# Patient Record
Sex: FEMALE | Race: WHITE | Employment: UNEMPLOYED | URBAN - METROPOLITAN AREA
[De-identification: names, ages, dates, MRNs, and addresses within clinical notes are randomized per-mention and may not be internally consistent; named-entity substitution may affect disease eponyms.]

---

## 2017-01-11 ENCOUNTER — TELEPHONE (OUTPATIENT)
Dept: FAMILY MEDICINE CLINIC | Age: 47
End: 2017-01-11

## 2017-02-10 ENCOUNTER — OFFICE VISIT (OUTPATIENT)
Dept: FAMILY MEDICINE CLINIC | Age: 47
End: 2017-02-10

## 2017-02-10 VITALS — DIASTOLIC BLOOD PRESSURE: 84 MMHG | SYSTOLIC BLOOD PRESSURE: 116 MMHG | HEART RATE: 84 BPM | TEMPERATURE: 97.8 F

## 2017-02-10 DIAGNOSIS — N95.1 PERIMENOPAUSAL: ICD-10-CM

## 2017-02-10 DIAGNOSIS — G47.9 SLEEP DISTURBANCE: ICD-10-CM

## 2017-02-10 DIAGNOSIS — R05.3 PERSISTENT COUGH: Primary | ICD-10-CM

## 2017-02-10 DIAGNOSIS — F41.9 ANXIETY: ICD-10-CM

## 2017-02-10 PROCEDURE — 99213 OFFICE O/P EST LOW 20 MIN: CPT | Performed by: NURSE PRACTITIONER

## 2017-02-10 RX ORDER — ALBUTEROL SULFATE 90 UG/1
2 AEROSOL, METERED RESPIRATORY (INHALATION) EVERY 6 HOURS PRN
Qty: 1 INHALER | Refills: 3 | Status: SHIPPED | OUTPATIENT
Start: 2017-02-10 | End: 2019-07-19 | Stop reason: SDUPTHER

## 2017-02-10 RX ORDER — CETIRIZINE HYDROCHLORIDE 10 MG/1
10 TABLET ORAL DAILY
Qty: 30 TABLET | Refills: 1 | Status: SHIPPED | OUTPATIENT
Start: 2017-02-10 | End: 2017-03-23

## 2017-02-10 RX ORDER — FLUTICASONE PROPIONATE 50 MCG
1 SPRAY, SUSPENSION (ML) NASAL DAILY
Qty: 1 BOTTLE | Refills: 3 | Status: SHIPPED | OUTPATIENT
Start: 2017-02-10 | End: 2017-12-20 | Stop reason: SDUPTHER

## 2017-02-10 RX ORDER — CLONAZEPAM 0.5 MG/1
.5-1 TABLET ORAL 3 TIMES DAILY PRN
Qty: 90 TABLET | Refills: 1 | Status: SHIPPED | OUTPATIENT
Start: 2017-02-10 | End: 2017-11-14 | Stop reason: ALTCHOICE

## 2017-02-10 RX ORDER — PROMETHAZINE HYDROCHLORIDE AND CODEINE PHOSPHATE 6.25; 1 MG/5ML; MG/5ML
5 SYRUP ORAL EVERY 8 HOURS PRN
Qty: 118 ML | Refills: 0 | Status: SHIPPED | OUTPATIENT
Start: 2017-02-10 | End: 2017-03-02 | Stop reason: SDUPTHER

## 2017-02-22 ENCOUNTER — TELEPHONE (OUTPATIENT)
Dept: FAMILY MEDICINE CLINIC | Age: 47
End: 2017-02-22

## 2017-02-22 RX ORDER — HYDROXYZINE HYDROCHLORIDE 25 MG/1
25-50 TABLET, FILM COATED ORAL NIGHTLY PRN
Qty: 60 TABLET | Refills: 1 | Status: SHIPPED | OUTPATIENT
Start: 2017-02-22 | End: 2017-04-14 | Stop reason: SINTOL

## 2017-02-22 RX ORDER — ZOLPIDEM TARTRATE 10 MG/1
10 TABLET ORAL NIGHTLY PRN
Qty: 30 TABLET | Refills: 0 | Status: SHIPPED | OUTPATIENT
Start: 2017-02-22 | End: 2017-03-23 | Stop reason: SDUPTHER

## 2017-02-26 ASSESSMENT — ENCOUNTER SYMPTOMS
SHORTNESS OF BREATH: 0
WHEEZING: 1
GASTROINTESTINAL NEGATIVE: 1
EYES NEGATIVE: 1
COUGH: 1
CHEST TIGHTNESS: 0

## 2017-03-02 RX ORDER — PROMETHAZINE HYDROCHLORIDE AND CODEINE PHOSPHATE 6.25; 1 MG/5ML; MG/5ML
5 SYRUP ORAL EVERY 8 HOURS PRN
Qty: 118 ML | Refills: 0 | Status: SHIPPED | OUTPATIENT
Start: 2017-03-02 | End: 2017-03-09

## 2017-03-23 ENCOUNTER — OFFICE VISIT (OUTPATIENT)
Dept: FAMILY MEDICINE CLINIC | Age: 47
End: 2017-03-23

## 2017-03-23 VITALS
SYSTOLIC BLOOD PRESSURE: 118 MMHG | TEMPERATURE: 97.5 F | DIASTOLIC BLOOD PRESSURE: 88 MMHG | HEART RATE: 100 BPM | RESPIRATION RATE: 12 BRPM

## 2017-03-23 DIAGNOSIS — E11.9 DM TYPE 2, GOAL HBA1C < 8% (HCC): Primary | ICD-10-CM

## 2017-03-23 DIAGNOSIS — J06.9 VIRAL UPPER RESPIRATORY TRACT INFECTION: ICD-10-CM

## 2017-03-23 DIAGNOSIS — H65.07 RECURRENT ACUTE SEROUS OTITIS MEDIA, UNSPECIFIED LATERALITY: ICD-10-CM

## 2017-03-23 DIAGNOSIS — G47.9 SLEEP DISTURBANCE: ICD-10-CM

## 2017-03-23 PROCEDURE — 99213 OFFICE O/P EST LOW 20 MIN: CPT | Performed by: NURSE PRACTITIONER

## 2017-03-23 RX ORDER — ZOLPIDEM TARTRATE 10 MG/1
10 TABLET ORAL NIGHTLY PRN
Qty: 30 TABLET | Refills: 1 | Status: SHIPPED | OUTPATIENT
Start: 2017-03-23 | End: 2017-04-22

## 2017-03-23 RX ORDER — CEFUROXIME AXETIL 250 MG/1
250 TABLET ORAL 2 TIMES DAILY
Qty: 20 TABLET | Refills: 0 | Status: SHIPPED | OUTPATIENT
Start: 2017-03-23 | End: 2018-04-02 | Stop reason: SDUPTHER

## 2017-03-23 RX ORDER — ONDANSETRON 4 MG/1
4 TABLET, FILM COATED ORAL EVERY 8 HOURS PRN
Qty: 30 TABLET | Refills: 0 | Status: SHIPPED | OUTPATIENT
Start: 2017-03-23 | End: 2018-02-07 | Stop reason: SDUPTHER

## 2017-03-29 ASSESSMENT — ENCOUNTER SYMPTOMS
SORE THROAT: 1
SINUS PRESSURE: 1
GASTROINTESTINAL NEGATIVE: 1
COUGH: 1
SHORTNESS OF BREATH: 0
EYES NEGATIVE: 1

## 2017-04-03 ENCOUNTER — TELEPHONE (OUTPATIENT)
Dept: FAMILY MEDICINE CLINIC | Age: 47
End: 2017-04-03

## 2017-04-04 RX ORDER — RAMELTEON 8 MG/1
8 TABLET ORAL NIGHTLY PRN
Qty: 30 TABLET | Refills: 0 | Status: SHIPPED | OUTPATIENT
Start: 2017-04-04 | End: 2017-06-15

## 2017-06-15 ENCOUNTER — OFFICE VISIT (OUTPATIENT)
Dept: FAMILY MEDICINE CLINIC | Age: 47
End: 2017-06-15

## 2017-06-15 VITALS
TEMPERATURE: 97.6 F | DIASTOLIC BLOOD PRESSURE: 84 MMHG | BODY MASS INDEX: 40.93 KG/M2 | SYSTOLIC BLOOD PRESSURE: 108 MMHG | HEART RATE: 100 BPM | WEIGHT: 231 LBS | RESPIRATION RATE: 20 BRPM | HEIGHT: 63 IN

## 2017-06-15 DIAGNOSIS — M54.16 LUMBAR RADICULOPATHY: ICD-10-CM

## 2017-06-15 DIAGNOSIS — G47.9 SLEEP DISTURBANCE: ICD-10-CM

## 2017-06-15 DIAGNOSIS — F41.9 ANXIETY: ICD-10-CM

## 2017-06-15 DIAGNOSIS — M15.9 PRIMARY OSTEOARTHRITIS INVOLVING MULTIPLE JOINTS: ICD-10-CM

## 2017-06-15 DIAGNOSIS — E11.9 TYPE 2 DIABETES, HBA1C GOAL < 7% (HCC): Primary | ICD-10-CM

## 2017-06-15 LAB
CREATININE URINE POCT: 200
MICROALBUMIN/CREAT 24H UR: 10 MG/G{CREAT}
MICROALBUMIN/CREAT UR-RTO: 30

## 2017-06-15 PROCEDURE — 96372 THER/PROPH/DIAG INJ SC/IM: CPT | Performed by: NURSE PRACTITIONER

## 2017-06-15 PROCEDURE — 82044 UR ALBUMIN SEMIQUANTITATIVE: CPT | Performed by: NURSE PRACTITIONER

## 2017-06-15 PROCEDURE — 99213 OFFICE O/P EST LOW 20 MIN: CPT | Performed by: NURSE PRACTITIONER

## 2017-06-15 RX ORDER — CITALOPRAM 40 MG/1
TABLET ORAL
Qty: 90 TABLET | Refills: 2 | Status: SHIPPED | OUTPATIENT
Start: 2017-06-15 | End: 2017-12-20

## 2017-06-15 RX ORDER — ZOLPIDEM TARTRATE 10 MG/1
TABLET ORAL NIGHTLY PRN
COMMUNITY
Start: 2017-05-17 | End: 2017-06-15 | Stop reason: SDUPTHER

## 2017-06-15 RX ORDER — OXYCODONE HYDROCHLORIDE AND ACETAMINOPHEN 5; 325 MG/1; MG/1
1 TABLET ORAL EVERY 4 HOURS PRN
Qty: 90 TABLET | Refills: 0 | Status: SHIPPED | OUTPATIENT
Start: 2017-06-15 | End: 2017-12-20 | Stop reason: SDUPTHER

## 2017-06-15 RX ORDER — ZOLPIDEM TARTRATE 10 MG/1
10 TABLET ORAL NIGHTLY PRN
Qty: 30 TABLET | Refills: 1 | Status: SHIPPED | OUTPATIENT
Start: 2017-06-15 | End: 2017-08-14 | Stop reason: SDUPTHER

## 2017-06-15 RX ORDER — METHYLPREDNISOLONE ACETATE 80 MG/ML
120 INJECTION, SUSPENSION INTRA-ARTICULAR; INTRALESIONAL; INTRAMUSCULAR; SOFT TISSUE ONCE
Status: COMPLETED | OUTPATIENT
Start: 2017-06-15 | End: 2017-06-15

## 2017-06-15 RX ADMIN — METHYLPREDNISOLONE ACETATE 120 MG: 80 INJECTION, SUSPENSION INTRA-ARTICULAR; INTRALESIONAL; INTRAMUSCULAR; SOFT TISSUE at 10:49

## 2017-06-26 ASSESSMENT — ENCOUNTER SYMPTOMS
EYES NEGATIVE: 1
RESPIRATORY NEGATIVE: 1
GASTROINTESTINAL NEGATIVE: 1

## 2017-08-15 RX ORDER — ZOLPIDEM TARTRATE 10 MG/1
TABLET ORAL
Qty: 30 TABLET | Refills: 1 | Status: SHIPPED | OUTPATIENT
Start: 2017-08-15 | End: 2017-11-14 | Stop reason: SDUPTHER

## 2017-08-23 ENCOUNTER — APPOINTMENT (OUTPATIENT)
Dept: GENERAL RADIOLOGY | Age: 47
End: 2017-08-23
Payer: COMMERCIAL

## 2017-08-23 ENCOUNTER — HOSPITAL ENCOUNTER (EMERGENCY)
Age: 47
Discharge: HOME | End: 2017-08-24
Payer: COMMERCIAL

## 2017-08-23 VITALS
TEMPERATURE: 98.7 F | RESPIRATION RATE: 18 BRPM | BODY MASS INDEX: 38.7 KG/M2 | HEART RATE: 83 BPM | SYSTOLIC BLOOD PRESSURE: 124 MMHG | DIASTOLIC BLOOD PRESSURE: 82 MMHG | WEIGHT: 215 LBS | OXYGEN SATURATION: 97 %

## 2017-08-23 DIAGNOSIS — S39.012A LUMBAR STRAIN, INITIAL ENCOUNTER: Primary | ICD-10-CM

## 2017-08-23 DIAGNOSIS — R07.81 RIB PAIN ON RIGHT SIDE: ICD-10-CM

## 2017-08-23 PROCEDURE — 72100 X-RAY EXAM L-S SPINE 2/3 VWS: CPT

## 2017-08-23 PROCEDURE — 99283 EMERGENCY DEPT VISIT LOW MDM: CPT

## 2017-08-23 PROCEDURE — 71101 X-RAY EXAM UNILAT RIBS/CHEST: CPT

## 2017-08-23 PROCEDURE — 72072 X-RAY EXAM THORAC SPINE 3VWS: CPT

## 2017-08-23 RX ORDER — METHYLPREDNISOLONE 4 MG/1
TABLET ORAL
Qty: 1 KIT | Refills: 0 | Status: SHIPPED | OUTPATIENT
Start: 2017-08-23 | End: 2017-08-29

## 2017-08-23 ASSESSMENT — PAIN DESCRIPTION - ORIENTATION: ORIENTATION: RIGHT

## 2017-08-23 ASSESSMENT — PAIN SCALES - GENERAL: PAINLEVEL_OUTOF10: 10

## 2017-08-23 ASSESSMENT — ENCOUNTER SYMPTOMS
BACK PAIN: 1
CHEST TIGHTNESS: 0
COUGH: 0
SHORTNESS OF BREATH: 0
RHINORRHEA: 0

## 2017-08-23 ASSESSMENT — PAIN DESCRIPTION - LOCATION: LOCATION: BACK

## 2017-08-24 PROCEDURE — 6360000002 HC RX W HCPCS: Performed by: NURSE PRACTITIONER

## 2017-08-24 PROCEDURE — 96372 THER/PROPH/DIAG INJ SC/IM: CPT

## 2017-08-24 RX ADMIN — HYDROMORPHONE HYDROCHLORIDE 0.5 MG: 1 INJECTION, SOLUTION INTRAMUSCULAR; INTRAVENOUS; SUBCUTANEOUS at 00:20

## 2017-08-24 ASSESSMENT — PAIN SCALES - GENERAL: PAINLEVEL_OUTOF10: 9

## 2017-09-12 ENCOUNTER — OFFICE VISIT (OUTPATIENT)
Dept: FAMILY MEDICINE CLINIC | Age: 47
End: 2017-09-12
Payer: COMMERCIAL

## 2017-09-12 VITALS
RESPIRATION RATE: 20 BRPM | DIASTOLIC BLOOD PRESSURE: 88 MMHG | HEART RATE: 84 BPM | SYSTOLIC BLOOD PRESSURE: 118 MMHG | WEIGHT: 234 LBS | BODY MASS INDEX: 41.46 KG/M2 | HEIGHT: 63 IN | TEMPERATURE: 97.8 F

## 2017-09-12 DIAGNOSIS — G54.8 INTERCOSTAL NEUROPATHIC PAIN: Primary | ICD-10-CM

## 2017-09-12 DIAGNOSIS — N95.1 MENOPAUSAL SYMPTOMS: ICD-10-CM

## 2017-09-12 PROCEDURE — 96372 THER/PROPH/DIAG INJ SC/IM: CPT | Performed by: NURSE PRACTITIONER

## 2017-09-12 PROCEDURE — 99213 OFFICE O/P EST LOW 20 MIN: CPT | Performed by: NURSE PRACTITIONER

## 2017-09-12 RX ORDER — CYCLOBENZAPRINE HCL 10 MG
10 TABLET ORAL 2 TIMES DAILY PRN
Qty: 60 TABLET | Refills: 1 | Status: SHIPPED | OUTPATIENT
Start: 2017-09-12 | End: 2017-12-20 | Stop reason: SDUPTHER

## 2017-09-12 RX ORDER — CITALOPRAM 20 MG/1
20 TABLET ORAL DAILY
Qty: 90 TABLET | Refills: 3 | Status: SHIPPED | OUTPATIENT
Start: 2017-09-12 | End: 2017-12-20

## 2017-09-12 RX ORDER — METHYLPREDNISOLONE ACETATE 80 MG/ML
160 INJECTION, SUSPENSION INTRA-ARTICULAR; INTRALESIONAL; INTRAMUSCULAR; SOFT TISSUE ONCE
Status: COMPLETED | OUTPATIENT
Start: 2017-09-12 | End: 2017-09-12

## 2017-09-12 ASSESSMENT — PATIENT HEALTH QUESTIONNAIRE - PHQ9
1. LITTLE INTEREST OR PLEASURE IN DOING THINGS: 0
SUM OF ALL RESPONSES TO PHQ QUESTIONS 1-9: 0
SUM OF ALL RESPONSES TO PHQ9 QUESTIONS 1 & 2: 0
2. FEELING DOWN, DEPRESSED OR HOPELESS: 0

## 2017-09-18 RX ORDER — ZOLPIDEM TARTRATE 10 MG/1
TABLET ORAL
Qty: 30 TABLET | Refills: 1 | OUTPATIENT
Start: 2017-09-18

## 2017-09-19 ASSESSMENT — ENCOUNTER SYMPTOMS
EYES NEGATIVE: 1
RESPIRATORY NEGATIVE: 1
BACK PAIN: 1
GASTROINTESTINAL NEGATIVE: 1

## 2017-09-20 RX ORDER — ZOLPIDEM TARTRATE 10 MG/1
TABLET ORAL
Qty: 30 TABLET | Refills: 0 | OUTPATIENT
Start: 2017-09-20

## 2017-11-14 RX ORDER — ALPRAZOLAM 0.5 MG/1
TABLET ORAL
Qty: 90 TABLET | Refills: 0 | Status: SHIPPED | OUTPATIENT
Start: 2017-11-14 | End: 2017-12-20 | Stop reason: SDUPTHER

## 2017-11-14 RX ORDER — ZOLPIDEM TARTRATE 10 MG/1
TABLET ORAL
Qty: 30 TABLET | Refills: 0 | Status: SHIPPED | OUTPATIENT
Start: 2017-11-14 | End: 2017-12-20 | Stop reason: SDUPTHER

## 2017-11-14 RX ORDER — CLONAZEPAM 0.5 MG/1
TABLET ORAL
Qty: 90 TABLET | Refills: 1 | OUTPATIENT
Start: 2017-11-14

## 2017-12-20 ENCOUNTER — OFFICE VISIT (OUTPATIENT)
Dept: FAMILY MEDICINE CLINIC | Age: 47
End: 2017-12-20
Payer: COMMERCIAL

## 2017-12-20 VITALS
RESPIRATION RATE: 16 BRPM | HEART RATE: 72 BPM | BODY MASS INDEX: 39.27 KG/M2 | DIASTOLIC BLOOD PRESSURE: 84 MMHG | HEIGHT: 64 IN | SYSTOLIC BLOOD PRESSURE: 110 MMHG | WEIGHT: 230 LBS | TEMPERATURE: 98.3 F

## 2017-12-20 DIAGNOSIS — F41.8 SITUATIONAL ANXIETY: ICD-10-CM

## 2017-12-20 DIAGNOSIS — R45.4 IRRITABILITY: ICD-10-CM

## 2017-12-20 DIAGNOSIS — G47.9 SLEEP DISTURBANCE: ICD-10-CM

## 2017-12-20 DIAGNOSIS — R61 NIGHT SWEATS: ICD-10-CM

## 2017-12-20 DIAGNOSIS — B37.0 ORAL THRUSH: ICD-10-CM

## 2017-12-20 DIAGNOSIS — E11.9 TYPE 2 DIABETES MELLITUS WITHOUT COMPLICATION, WITHOUT LONG-TERM CURRENT USE OF INSULIN (HCC): Primary | Chronic | ICD-10-CM

## 2017-12-20 DIAGNOSIS — N95.1 PERIMENOPAUSAL: ICD-10-CM

## 2017-12-20 LAB — GLUCOSE BLD-MCNC: 116 MG/DL

## 2017-12-20 PROCEDURE — 82962 GLUCOSE BLOOD TEST: CPT | Performed by: NURSE PRACTITIONER

## 2017-12-20 PROCEDURE — 90688 IIV4 VACCINE SPLT 0.5 ML IM: CPT | Performed by: NURSE PRACTITIONER

## 2017-12-20 PROCEDURE — 90471 IMMUNIZATION ADMIN: CPT | Performed by: NURSE PRACTITIONER

## 2017-12-20 PROCEDURE — 99214 OFFICE O/P EST MOD 30 MIN: CPT | Performed by: NURSE PRACTITIONER

## 2017-12-20 RX ORDER — BUSPIRONE HYDROCHLORIDE 7.5 MG/1
7.5 TABLET ORAL 2 TIMES DAILY
Qty: 60 TABLET | Refills: 1 | Status: SHIPPED | OUTPATIENT
Start: 2017-12-20 | End: 2018-02-07

## 2017-12-20 RX ORDER — DULOXETIN HYDROCHLORIDE 60 MG/1
60 CAPSULE, DELAYED RELEASE ORAL DAILY
Qty: 30 CAPSULE | Refills: 3 | Status: SHIPPED | OUTPATIENT
Start: 2017-12-20 | End: 2018-02-07

## 2017-12-20 RX ORDER — ALBUTEROL SULFATE 90 UG/1
2 AEROSOL, METERED RESPIRATORY (INHALATION) EVERY 6 HOURS PRN
Qty: 1 INHALER | Refills: 3 | Status: SHIPPED | OUTPATIENT
Start: 2017-12-20 | End: 2018-08-24

## 2017-12-20 RX ORDER — FLUCONAZOLE 150 MG/1
150 TABLET ORAL
Qty: 10 TABLET | Refills: 0 | Status: SHIPPED | OUTPATIENT
Start: 2017-12-20 | End: 2018-01-19

## 2017-12-20 RX ORDER — CYCLOBENZAPRINE HCL 10 MG
10 TABLET ORAL 2 TIMES DAILY PRN
Qty: 60 TABLET | Refills: 1 | Status: SHIPPED | OUTPATIENT
Start: 2017-12-20 | End: 2020-01-30 | Stop reason: SDUPTHER

## 2017-12-20 RX ORDER — DULOXETIN HYDROCHLORIDE 30 MG/1
30 CAPSULE, DELAYED RELEASE ORAL DAILY
Qty: 30 CAPSULE | Refills: 0 | Status: SHIPPED | OUTPATIENT
Start: 2017-12-20 | End: 2018-01-17 | Stop reason: SDUPTHER

## 2017-12-20 RX ORDER — ALPRAZOLAM 0.5 MG/1
TABLET ORAL
Qty: 90 TABLET | Refills: 0 | Status: SHIPPED | OUTPATIENT
Start: 2017-12-20 | End: 2018-02-07 | Stop reason: SDUPTHER

## 2017-12-20 RX ORDER — FLUTICASONE PROPIONATE 50 MCG
1 SPRAY, SUSPENSION (ML) NASAL DAILY
Qty: 1 BOTTLE | Refills: 3 | Status: SHIPPED | OUTPATIENT
Start: 2017-12-20 | End: 2018-12-10 | Stop reason: SDUPTHER

## 2017-12-20 RX ORDER — ZOLPIDEM TARTRATE 10 MG/1
TABLET ORAL
Qty: 30 TABLET | Refills: 0 | Status: SHIPPED | OUTPATIENT
Start: 2017-12-20 | End: 2018-01-18 | Stop reason: SDUPTHER

## 2017-12-21 RX ORDER — OXYCODONE HYDROCHLORIDE AND ACETAMINOPHEN 5; 325 MG/1; MG/1
1 TABLET ORAL EVERY 4 HOURS PRN
Qty: 90 TABLET | Refills: 0 | Status: SHIPPED | OUTPATIENT
Start: 2017-12-21 | End: 2018-06-25 | Stop reason: SDUPTHER

## 2018-01-01 ASSESSMENT — ENCOUNTER SYMPTOMS
ALLERGIC/IMMUNOLOGIC NEGATIVE: 1
EYES NEGATIVE: 1
GASTROINTESTINAL NEGATIVE: 1
RESPIRATORY NEGATIVE: 1

## 2018-01-01 NOTE — PROGRESS NOTES
[Hydrocodone-Acetaminophen] Nausea And Vomiting     Health Maintenance   Topic Date Due    HIV screen  12/22/1983    DTaP/Tdap/Td vaccine (1 - Tdap) 12/22/1987    Pneumococcal med risk (1 of 1 - PPSV23) 12/22/1987    Cervical cancer screen  10/01/2015    Diabetic foot exam  12/12/2017    Diabetic hemoglobin A1C test  01/25/2018    Lipid screen  01/25/2018    Diabetic microalbuminuria test  06/15/2018    Diabetic retinal exam  08/31/2018    Flu vaccine  Completed         Objective:     Physical Exam   Constitutional: She is oriented to person, place, and time. She appears well-developed and well-nourished. HENT:   Head: Normocephalic. Mouth/Throat: Oropharynx is clear and moist.       White film noted on tongue, tongue erythema   Eyes: Conjunctivae are normal.   Neck: Neck supple. No JVD present. No thyromegaly present. Cardiovascular: Normal rate, regular rhythm, normal heart sounds and intact distal pulses. Pulmonary/Chest: Effort normal and breath sounds normal.   Abdominal: Soft. Bowel sounds are normal.   Musculoskeletal: Normal range of motion. Lymphadenopathy:     She has no cervical adenopathy. Neurological: She is alert and oriented to person, place, and time. Neg pedal exam   Skin: Skin is warm and dry. Psychiatric: Her speech is normal and behavior is normal. Judgment normal. Her mood appears anxious. Thought content is not paranoid. Cognition and memory are normal. She expresses no homicidal and no suicidal ideation. Nursing note and vitals reviewed. /84   Pulse 72   Temp 98.3 °F (36.8 °C) (Oral)   Resp 16   Ht 5' 4\" (1.626 m)   Wt 230 lb (104.3 kg)   BMI 39.48 kg/m²       Impression/Plan:  1. Type 2 diabetes mellitus without complication, without long-term current use of insulin ([address]    2. Perimenopausal    3. Situational anxiety    4. Irritability    5. Sleep disturbance    6. Night sweats    7.  Oral thrush      Requested Prescriptions     Signed Prescriptions Disp Refills    zolpidem (AMBIEN) 10 MG tablet 30 tablet 0     Sig: take 1 tablet by mouth at bedtime if needed for sleep.  ALPRAZolam (XANAX) 0.5 MG tablet 90 tablet 0     Sig: take 1 tablet by mouth three times a day if needed.     cyclobenzaprine (FLEXERIL) 10 MG tablet 60 tablet 1     Sig: Take 1 tablet by mouth 2 times daily as needed for Muscle spasms    metFORMIN (GLUCOPHAGE) 1000 MG tablet 60 tablet 5     Sig: Take 1 tablet by mouth 2 times daily (with meals)    fluticasone (FLONASE) 50 MCG/ACT nasal spray 1 Bottle 3     Si spray by Nasal route daily    albuterol sulfate HFA (PROAIR HFA) 108 (90 Base) MCG/ACT inhaler 1 Inhaler 3     Sig: Inhale 2 puffs into the lungs every 6 hours as needed for Wheezing    DULoxetine (CYMBALTA) 30 MG extended release capsule 30 capsule 0     Sig: Take 1 capsule by mouth daily    DULoxetine (CYMBALTA) 60 MG extended release capsule 30 capsule 3     Sig: Take 1 capsule by mouth daily    fluconazole (DIFLUCAN) 150 MG tablet 10 tablet 0     Sig: Take 1 tablet by mouth every 3 days    nystatin (MYCOSTATIN) 486462 UNIT/ML suspension 1 Bottle 0     Sig: Take 5 mLs by mouth 4 times daily for 21 days Swish/ swallow    busPIRone (BUSPAR) 7.5 MG tablet 60 tablet 1     Sig: Take 1 tablet by mouth 2 times daily     Orders Placed This Encounter   Procedures    INFLUENZA, QUADV, 3 YRS AND OLDER, IM, MDV, 0.5ML (FLUZONE QUADV)    Comprehensive Metabolic Panel     Standing Status:   Future     Standing Expiration Date:   2018    Lipid Panel     Standing Status:   Future     Standing Expiration Date:   2018     Order Specific Question:   Is Patient Fasting?/# of Hours     Answer:   yes/12    TSH without Reflex     Standing Status:   Future     Standing Expiration Date:   2018    Hemoglobin A1C     Standing Status:   Future     Standing Expiration Date:   2018    Microalbumin / Creatinine Urine Ratio     Standing Status:   Future Standing Expiration Date:   12/20/2018    Vitamin D 1,25 Dihydroxy     Standing Status:   Future     Standing Expiration Date:   12/20/2018    POCT Glucose    HM DIABETES FOOT EXAM       Patient given educational materials - see patient instructions. Discussed use, benefit, and side effects of prescribed medications. All patient questions answered. Pt voiced understanding. Reviewed health maintenance. Patient agreed with treatment plan. Follow up as directed. Controlled Substances Monitoring:     Attestation: The Prescription Monitoring Report for this patient was reviewed today. (Brett Sandoval NP)  Documentation: Possible medication side effects, risk of tolerance and/or dependence, and alternative treatments discussed., No signs of potential drug abuse or diversion identified. Brett Sandoval NP)  Medication Contracts: Medication contract signed today.  Brett Sandoval NP)      Electronically signed by Marilyn Lomas NP on 1/1/2018 at 2:33 PM

## 2018-01-03 ENCOUNTER — TELEPHONE (OUTPATIENT)
Dept: FAMILY MEDICINE CLINIC | Age: 48
End: 2018-01-03

## 2018-01-17 RX ORDER — DULOXETIN HYDROCHLORIDE 30 MG/1
30 CAPSULE, DELAYED RELEASE ORAL DAILY
Qty: 30 CAPSULE | Refills: 0 | Status: SHIPPED | OUTPATIENT
Start: 2018-01-17 | End: 2018-02-07

## 2018-01-19 RX ORDER — ZOLPIDEM TARTRATE 10 MG/1
TABLET ORAL
Qty: 30 TABLET | Refills: 1 | Status: SHIPPED | OUTPATIENT
Start: 2018-01-19 | End: 2018-03-20 | Stop reason: SDUPTHER

## 2018-02-07 ENCOUNTER — OFFICE VISIT (OUTPATIENT)
Dept: FAMILY MEDICINE CLINIC | Age: 48
End: 2018-02-07
Payer: COMMERCIAL

## 2018-02-07 VITALS
HEART RATE: 80 BPM | RESPIRATION RATE: 16 BRPM | WEIGHT: 235.6 LBS | SYSTOLIC BLOOD PRESSURE: 128 MMHG | DIASTOLIC BLOOD PRESSURE: 86 MMHG | TEMPERATURE: 98.1 F | HEIGHT: 64 IN | BODY MASS INDEX: 40.22 KG/M2

## 2018-02-07 DIAGNOSIS — E11.9 TYPE 2 DIABETES MELLITUS WITHOUT COMPLICATION, WITHOUT LONG-TERM CURRENT USE OF INSULIN (HCC): Chronic | ICD-10-CM

## 2018-02-07 DIAGNOSIS — F41.9 ANXIETY: Primary | ICD-10-CM

## 2018-02-07 PROCEDURE — 99213 OFFICE O/P EST LOW 20 MIN: CPT | Performed by: NURSE PRACTITIONER

## 2018-02-07 RX ORDER — METFORMIN HYDROCHLORIDE 750 MG/1
750 TABLET, EXTENDED RELEASE ORAL
Qty: 30 TABLET | Refills: 3 | Status: SHIPPED | OUTPATIENT
Start: 2018-02-07 | End: 2018-05-07 | Stop reason: SDUPTHER

## 2018-02-07 RX ORDER — ALPRAZOLAM 0.5 MG/1
TABLET ORAL
Qty: 90 TABLET | Refills: 0 | Status: SHIPPED | OUTPATIENT
Start: 2018-02-07 | End: 2018-10-01 | Stop reason: SDUPTHER

## 2018-02-07 RX ORDER — ONDANSETRON 4 MG/1
4 TABLET, FILM COATED ORAL EVERY 8 HOURS PRN
Qty: 60 TABLET | Refills: 1 | Status: SHIPPED | OUTPATIENT
Start: 2018-02-07 | End: 2021-02-11 | Stop reason: SDUPTHER

## 2018-02-07 RX ORDER — ESCITALOPRAM OXALATE 10 MG/1
10 TABLET ORAL DAILY
Qty: 30 TABLET | Refills: 3 | Status: SHIPPED | OUTPATIENT
Start: 2018-02-07 | End: 2018-03-20 | Stop reason: SDUPTHER

## 2018-02-15 ASSESSMENT — ENCOUNTER SYMPTOMS
GASTROINTESTINAL NEGATIVE: 1
EYES NEGATIVE: 1
RESPIRATORY NEGATIVE: 1
ALLERGIC/IMMUNOLOGIC NEGATIVE: 1

## 2018-02-15 NOTE — PROGRESS NOTES
Social History   Substance Use Topics    Smoking status: Former Smoker     Quit date: 9/6/2000    Smokeless tobacco: Never Used    Alcohol use Yes      Comment: rarely      Current Outpatient Prescriptions   Medication Sig Dispense Refill    escitalopram (LEXAPRO) 10 MG tablet Take 1 tablet by mouth daily 30 tablet 3    metFORMIN (GLUCOPHAGE XR) 750 MG extended release tablet Take 1 tablet by mouth daily (with breakfast) 30 tablet 3    ondansetron (ZOFRAN) 4 MG tablet Take 1 tablet by mouth every 8 hours as needed for Nausea 60 tablet 1    zolpidem (AMBIEN) 10 MG tablet take 1 tablet by mouth at bedtime if needed for sleep 30 tablet 1    oxyCODONE-acetaminophen (PERCOCET) 5-325 MG per tablet Take 1 tablet by mouth every 4 hours as needed for Pain . 90 tablet 0    cyclobenzaprine (FLEXERIL) 10 MG tablet Take 1 tablet by mouth 2 times daily as needed for Muscle spasms 60 tablet 1    fluticasone (FLONASE) 50 MCG/ACT nasal spray 1 spray by Nasal route daily 1 Bottle 3    albuterol sulfate HFA (PROAIR HFA) 108 (90 Base) MCG/ACT inhaler Inhale 2 puffs into the lungs every 6 hours as needed for Wheezing 1 Inhaler 3    lidocaine (XYLOCAINE) 2 % jelly Apply 3 times daily 1 Tube 3    omeprazole (PRILOSEC) 10 MG delayed release capsule Take 10 mg by mouth daily as needed      Glucose Blood (BLOOD GLUCOSE TEST STRIPS) STRP Contour EZ test strips and lancets test daily E11.9 50 strip 11     No current facility-administered medications for this visit.       Allergies   Allergen Reactions    Sulfa Antibiotics Itching    Vicodin [Hydrocodone-Acetaminophen] Nausea And Vomiting     Health Maintenance   Topic Date Due    HIV screen  12/22/1983    DTaP/Tdap/Td vaccine (1 - Tdap) 12/22/1987    Pneumococcal med risk (1 of 1 - PPSV23) 12/22/1987    Cervical cancer screen  10/01/2015    A1C test (Diabetic or Prediabetic)  01/25/2018    Lipid screen  01/25/2018    Diabetic microalbuminuria test  06/15/2018    Diabetic retinal exam  08/31/2018    Diabetic foot exam  01/01/2019    Flu vaccine  Completed         Objective:     Physical Exam   Constitutional: She is oriented to person, place, and time. Cardiovascular: Normal rate, normal heart sounds and intact distal pulses. Pulmonary/Chest: Effort normal and breath sounds normal.   Abdominal: Soft. Musculoskeletal: Normal range of motion. Neurological: She is alert and oriented to person, place, and time. Skin: Skin is warm and dry. Psychiatric: Judgment normal. Her mood appears anxious. Her speech is rapid and/or pressured. She is hyperactive. Thought content is not paranoid. Cognition and memory are normal. She expresses no homicidal and no suicidal ideation. Nursing note and vitals reviewed. /86 (Site: Right Arm, Position: Sitting)   Pulse 80   Temp 98.1 °F (36.7 °C) (Oral)   Resp 16   Ht 5' 4\" (1.626 m)   Wt 235 lb 9.6 oz (106.9 kg)   BMI 40.44 kg/m²       Impression/Plan:  1. Anxiety    2. Type 2 diabetes mellitus without complication, without long-term current use of insulin ([facility])      Requested Prescriptions     Signed Prescriptions Disp Refills    escitalopram (LEXAPRO) 10 MG tablet 30 tablet 3     Sig: Take 1 tablet by mouth daily    metFORMIN (GLUCOPHAGE XR) 750 MG extended release tablet 30 tablet 3     Sig: Take 1 tablet by mouth daily (with breakfast)    ALPRAZolam (XANAX) 0.5 MG tablet 90 tablet 0     Sig: take 1 tablet by mouth three times a day if needed for anxiety.  ondansetron (ZOFRAN) 4 MG tablet 60 tablet 1     Sig: Take 1 tablet by mouth every 8 hours as needed for Nausea     No orders of the defined types were placed in this encounter. Patient given educational materials - see patient instructions. Discussed use, benefit, and side effects of prescribed medications. All patient questions answered. Pt voiced understanding. Reviewed health maintenance. Patient agreed with treatment plan.  Follow up as directed.           Electronically signed by Mary Bell NP on 2/15/2018 at 2:57 PM

## 2018-03-20 DIAGNOSIS — F51.01 PRIMARY INSOMNIA: Chronic | ICD-10-CM

## 2018-03-20 RX ORDER — ESCITALOPRAM OXALATE 20 MG/1
20 TABLET ORAL DAILY
Qty: 30 TABLET | Refills: 2 | Status: SHIPPED | OUTPATIENT
Start: 2018-03-20 | End: 2018-06-26 | Stop reason: SDUPTHER

## 2018-03-20 RX ORDER — ZOLPIDEM TARTRATE 10 MG/1
10 TABLET ORAL NIGHTLY PRN
Qty: 30 TABLET | Refills: 2 | Status: SHIPPED | OUTPATIENT
Start: 2018-03-20 | End: 2018-06-18 | Stop reason: SDUPTHER

## 2018-04-02 RX ORDER — CEFUROXIME AXETIL 250 MG/1
250 TABLET ORAL 2 TIMES DAILY
Qty: 20 TABLET | Refills: 0 | Status: SHIPPED | OUTPATIENT
Start: 2018-04-02 | End: 2019-05-09 | Stop reason: SDUPTHER

## 2018-04-02 RX ORDER — FLUCONAZOLE 150 MG/1
150 TABLET ORAL
Qty: 3 TABLET | Refills: 0 | Status: SHIPPED | OUTPATIENT
Start: 2018-04-02 | End: 2019-05-09 | Stop reason: SDUPTHER

## 2018-04-23 ENCOUNTER — TELEPHONE (OUTPATIENT)
Dept: FAMILY MEDICINE CLINIC | Age: 48
End: 2018-04-23

## 2018-04-25 ENCOUNTER — TELEPHONE (OUTPATIENT)
Dept: FAMILY MEDICINE CLINIC | Age: 48
End: 2018-04-25

## 2018-04-26 ENCOUNTER — OFFICE VISIT (OUTPATIENT)
Dept: FAMILY MEDICINE CLINIC | Age: 48
End: 2018-04-26
Payer: COMMERCIAL

## 2018-04-26 VITALS
HEIGHT: 64 IN | TEMPERATURE: 98.4 F | WEIGHT: 233 LBS | RESPIRATION RATE: 20 BRPM | BODY MASS INDEX: 39.78 KG/M2 | SYSTOLIC BLOOD PRESSURE: 104 MMHG | DIASTOLIC BLOOD PRESSURE: 60 MMHG | HEART RATE: 96 BPM

## 2018-04-26 DIAGNOSIS — S06.0X0A CONCUSSION WITHOUT LOSS OF CONSCIOUSNESS, INITIAL ENCOUNTER: Primary | ICD-10-CM

## 2018-04-26 PROCEDURE — 99213 OFFICE O/P EST LOW 20 MIN: CPT | Performed by: NURSE PRACTITIONER

## 2018-04-26 RX ORDER — PROMETHAZINE HYDROCHLORIDE 25 MG/1
25 TABLET ORAL EVERY 8 HOURS PRN
Qty: 60 TABLET | Refills: 1 | Status: SHIPPED | OUTPATIENT
Start: 2018-04-26 | End: 2018-08-17 | Stop reason: ALTCHOICE

## 2018-05-07 ENCOUNTER — OFFICE VISIT (OUTPATIENT)
Dept: FAMILY MEDICINE CLINIC | Age: 48
End: 2018-05-07
Payer: COMMERCIAL

## 2018-05-07 ENCOUNTER — PATIENT MESSAGE (OUTPATIENT)
Dept: FAMILY MEDICINE CLINIC | Age: 48
End: 2018-05-07

## 2018-05-07 VITALS
HEART RATE: 72 BPM | RESPIRATION RATE: 16 BRPM | SYSTOLIC BLOOD PRESSURE: 112 MMHG | BODY MASS INDEX: 39.34 KG/M2 | WEIGHT: 231 LBS | TEMPERATURE: 97.8 F | DIASTOLIC BLOOD PRESSURE: 84 MMHG

## 2018-05-07 DIAGNOSIS — E11.9 TYPE 2 DIABETES MELLITUS WITHOUT COMPLICATION, WITHOUT LONG-TERM CURRENT USE OF INSULIN (HCC): Chronic | ICD-10-CM

## 2018-05-07 DIAGNOSIS — S06.0X0D CONCUSSION WITHOUT LOSS OF CONSCIOUSNESS, SUBSEQUENT ENCOUNTER: Primary | ICD-10-CM

## 2018-05-07 DIAGNOSIS — K95.09 GASTRIC BAND MALFUNCTION: ICD-10-CM

## 2018-05-07 DIAGNOSIS — R11.0 CHRONIC NAUSEA: ICD-10-CM

## 2018-05-07 PROCEDURE — 99213 OFFICE O/P EST LOW 20 MIN: CPT | Performed by: NURSE PRACTITIONER

## 2018-05-07 RX ORDER — METFORMIN HYDROCHLORIDE 500 MG/1
1000 TABLET, EXTENDED RELEASE ORAL
Qty: 60 TABLET | Refills: 3 | Status: SHIPPED | OUTPATIENT
Start: 2018-05-07 | End: 2018-09-13 | Stop reason: SDUPTHER

## 2018-05-07 RX ORDER — GLUCOSAMINE HCL/CHONDROITIN SU 500-400 MG
CAPSULE ORAL
Qty: 50 STRIP | Refills: 11 | Status: SHIPPED | OUTPATIENT
Start: 2018-05-07 | End: 2021-11-16

## 2018-05-08 DIAGNOSIS — S06.0X0D CONCUSSION WITHOUT LOSS OF CONSCIOUSNESS, SUBSEQUENT ENCOUNTER: Primary | ICD-10-CM

## 2018-05-08 RX ORDER — ACETAMINOPHEN AND CODEINE PHOSPHATE 300; 30 MG/1; MG/1
1 TABLET ORAL EVERY 4 HOURS PRN
Qty: 42 TABLET | Refills: 0 | Status: SHIPPED | OUTPATIENT
Start: 2018-05-08 | End: 2018-05-15

## 2018-05-09 ENCOUNTER — PATIENT MESSAGE (OUTPATIENT)
Dept: FAMILY MEDICINE CLINIC | Age: 48
End: 2018-05-09

## 2018-05-09 ENCOUNTER — TELEPHONE (OUTPATIENT)
Dept: FAMILY MEDICINE CLINIC | Age: 48
End: 2018-05-09

## 2018-05-15 ASSESSMENT — ENCOUNTER SYMPTOMS
EYES NEGATIVE: 1
RESPIRATORY NEGATIVE: 1
GASTROINTESTINAL NEGATIVE: 1

## 2018-05-30 RX ORDER — BUTALBITAL, ACETAMINOPHEN AND CAFFEINE 50; 325; 40 MG/1; MG/1; MG/1
1 TABLET ORAL EVERY 4 HOURS PRN
Qty: 60 TABLET | Refills: 0 | Status: SHIPPED | OUTPATIENT
Start: 2018-05-30 | End: 2018-08-24

## 2018-06-04 ENCOUNTER — PATIENT MESSAGE (OUTPATIENT)
Dept: FAMILY MEDICINE CLINIC | Age: 48
End: 2018-06-04

## 2018-06-04 RX ORDER — METFORMIN HYDROCHLORIDE 750 MG/1
TABLET, EXTENDED RELEASE ORAL
Qty: 30 TABLET | Refills: 3 | Status: SHIPPED | OUTPATIENT
Start: 2018-06-04 | End: 2018-08-17 | Stop reason: ALTCHOICE

## 2018-06-04 ASSESSMENT — ENCOUNTER SYMPTOMS
EYES NEGATIVE: 1
RESPIRATORY NEGATIVE: 1
NAUSEA: 1

## 2018-06-05 DIAGNOSIS — S06.0X0A CONCUSSION WITHOUT LOSS OF CONSCIOUSNESS, INITIAL ENCOUNTER: ICD-10-CM

## 2018-06-05 DIAGNOSIS — R51.9 NEW ONSET OF HEADACHES: ICD-10-CM
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
## 2018-06-11 ENCOUNTER — PATIENT MESSAGE (OUTPATIENT)
Dept: FAMILY MEDICINE CLINIC | Age: 48
End: 2018-06-11

## 2018-06-11 DIAGNOSIS — R51.9 NEW ONSET OF HEADACHES: ICD-10-CM

## 2018-06-11 DIAGNOSIS — S06.0X0D CONCUSSION WITHOUT LOSS OF CONSCIOUSNESS, SUBSEQUENT ENCOUNTER: Primary | ICD-10-CM

## 2018-06-14 ENCOUNTER — TELEPHONE (OUTPATIENT)
Dept: FAMILY MEDICINE CLINIC | Age: 48
End: 2018-06-14

## 2018-06-15 ENCOUNTER — HOSPITAL ENCOUNTER (OUTPATIENT)
Dept: CT IMAGING | Age: 48
Discharge: HOME | End: 2018-06-15
Payer: COMMERCIAL

## 2018-06-15 ENCOUNTER — APPOINTMENT (OUTPATIENT)
Dept: CT IMAGING | Age: 48
End: 2018-06-15
Payer: COMMERCIAL

## 2018-06-15 DIAGNOSIS — S06.0X0A CONCUSSION WITHOUT LOSS OF CONSCIOUSNESS, INITIAL ENCOUNTER: ICD-10-CM

## 2018-06-15 DIAGNOSIS — R51.9 NEW ONSET OF HEADACHES: ICD-10-CM
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
## 2018-06-15 PROCEDURE — 70450 CT HEAD/BRAIN W/O DYE: CPT

## 2018-06-15 PROCEDURE — 72125 CT NECK SPINE W/O DYE: CPT

## 2018-06-18 ENCOUNTER — TELEPHONE (OUTPATIENT)
Dept: FAMILY MEDICINE CLINIC | Age: 48
End: 2018-06-18

## 2018-06-18 DIAGNOSIS — F51.01 PRIMARY INSOMNIA: Primary | ICD-10-CM

## 2018-06-18 DIAGNOSIS — M48.02 CERVICAL STENOSIS OF SPINE: ICD-10-CM

## 2018-06-18 DIAGNOSIS — H70.11 CHRONIC MASTOIDITIS OF RIGHT SIDE: Primary | ICD-10-CM

## 2018-06-19 RX ORDER — ZOLPIDEM TARTRATE 10 MG/1
TABLET ORAL
Qty: 30 TABLET | Refills: 2 | Status: SHIPPED | OUTPATIENT
Start: 2018-06-19 | End: 2018-08-18

## 2018-06-24 ENCOUNTER — PATIENT MESSAGE (OUTPATIENT)
Dept: FAMILY MEDICINE CLINIC | Age: 48
End: 2018-06-24

## 2018-06-24 DIAGNOSIS — M54.2 NECK PAIN: Primary | ICD-10-CM

## 2018-06-25 RX ORDER — OXYCODONE HYDROCHLORIDE AND ACETAMINOPHEN 5; 325 MG/1; MG/1
1 TABLET ORAL EVERY 4 HOURS PRN
Qty: 90 TABLET | Refills: 0 | Status: SHIPPED | OUTPATIENT
Start: 2018-06-25 | End: 2018-08-24 | Stop reason: SDUPTHER

## 2018-06-26 ENCOUNTER — HOSPITAL ENCOUNTER (OUTPATIENT)
Dept: PHYSICAL THERAPY | Age: 48
Setting detail: THERAPIES SERIES
Discharge: HOME | End: 2018-06-26
Payer: COMMERCIAL

## 2018-06-26 PROCEDURE — 97161 PT EVAL LOW COMPLEX 20 MIN: CPT

## 2018-06-26 ASSESSMENT — PAIN DESCRIPTION - DESCRIPTORS: DESCRIPTORS: ACHING;SHARP;PRESSURE

## 2018-06-26 ASSESSMENT — PAIN DESCRIPTION - LOCATION: LOCATION: NECK;SHOULDER

## 2018-06-26 ASSESSMENT — PAIN DESCRIPTION - PROGRESSION: CLINICAL_PROGRESSION: GRADUALLY IMPROVING

## 2018-06-26 ASSESSMENT — PAIN DESCRIPTION - ORIENTATION: ORIENTATION: RIGHT;LEFT

## 2018-06-26 ASSESSMENT — PAIN DESCRIPTION - PAIN TYPE: TYPE: CHRONIC PAIN;ACUTE PAIN

## 2018-06-26 ASSESSMENT — PAIN SCALES - GENERAL: PAINLEVEL_OUTOF10: 7

## 2018-06-27 RX ORDER — ESCITALOPRAM OXALATE 20 MG/1
TABLET ORAL
Qty: 90 TABLET | Refills: 3 | Status: SHIPPED | OUTPATIENT
Start: 2018-06-27 | End: 2019-05-09 | Stop reason: SDUPTHER

## 2018-06-28 ENCOUNTER — TELEPHONE (OUTPATIENT)
Dept: FAMILY MEDICINE CLINIC | Age: 48
End: 2018-06-28

## 2018-06-28 ENCOUNTER — HOSPITAL ENCOUNTER (OUTPATIENT)
Dept: PHYSICAL THERAPY | Age: 48
Setting detail: THERAPIES SERIES
Discharge: HOME | End: 2018-06-28
Payer: COMMERCIAL

## 2018-06-28 PROCEDURE — 97140 MANUAL THERAPY 1/> REGIONS: CPT

## 2018-06-28 PROCEDURE — 97124 MASSAGE THERAPY: CPT

## 2018-06-28 ASSESSMENT — PAIN DESCRIPTION - PAIN TYPE: TYPE: CHRONIC PAIN

## 2018-06-28 ASSESSMENT — PAIN DESCRIPTION - LOCATION: LOCATION: NECK;SHOULDER;HEAD

## 2018-06-28 ASSESSMENT — PAIN DESCRIPTION - ORIENTATION: ORIENTATION: RIGHT;LEFT

## 2018-06-28 ASSESSMENT — PAIN SCALES - GENERAL: PAINLEVEL_OUTOF10: 7

## 2018-07-02 ENCOUNTER — HOSPITAL ENCOUNTER (OUTPATIENT)
Dept: PHYSICAL THERAPY | Age: 48
Setting detail: THERAPIES SERIES
Discharge: HOME | End: 2018-07-02
Payer: COMMERCIAL

## 2018-07-02 PROCEDURE — 97140 MANUAL THERAPY 1/> REGIONS: CPT

## 2018-07-02 ASSESSMENT — PAIN DESCRIPTION - LOCATION: LOCATION: NECK;SHOULDER;HEAD

## 2018-07-02 ASSESSMENT — PAIN DESCRIPTION - ORIENTATION: ORIENTATION: LEFT

## 2018-07-02 ASSESSMENT — PAIN SCALES - GENERAL: PAINLEVEL_OUTOF10: 6

## 2018-07-02 ASSESSMENT — PAIN DESCRIPTION - PAIN TYPE: TYPE: CHRONIC PAIN

## 2018-07-02 NOTE — PROGRESS NOTES
bilateral upper traps, bilateral cervical, SCLM muscle and splenius capitus muscles. Long term goals  Time Frame for Long term goals : 8 weeks   Long term goal 1: Neck Disability Index from 36% to 20%. Long term goal 2: Patient independent in home ex program in order to achieve above goals.           Carla Cerda, [address] [facility]

## 2018-07-03 LAB
ALBUMIN SERPL-MCNC: 3.9 G/DL (ref 3.5–5.2)
ALK PHOSPHATASE: 63 U/L (ref 30–103)
ALT SERPL-CCNC: 17 U/L (ref 5–40)
ANION GAP SERPL CALCULATED.3IONS-SCNC: 12 MEQ/L (ref 10–19)
AST SERPL-CCNC: 18 U/L (ref 9–40)
AVERAGE GLUCOSE: 120 MG/DL (ref 66–114)
BILIRUB SERPL-MCNC: 0.2 MG/DL
BUN BLDV-MCNC: 18 MG/DL (ref 8–23)
CALCIUM SERPL-MCNC: 9.3 MG/DL (ref 8.5–10.5)
CHLORIDE BLD-SCNC: 101 MEQ/L (ref 95–107)
CHOLESTEROL/HDL RATIO: 3.3
CHOLESTEROL: 169 MG/DL
CO2: 28 MEQ/L (ref 19–31)
CREAT SERPL-MCNC: 0.6 MG/DL (ref 0.6–1.3)
CREATINE, URINE: 91.8 MG/DL (ref 28–217)
EGFR AFRICAN AMERICAN: 124 ML/MIN/1.73 M2
EGFR IF NONAFRICAN AMERICAN: 107 ML/MIN/1.73 M2
GLUCOSE: 131 MG/DL (ref 70–99)
HBA1C MFR BLD: 5.8 % (ref 4.2–5.8)
HDLC SERPL-MCNC: 52 MG/DL
LDL CHOLESTEROL CALCULATED: 80 MG/DL
LDL/HDL RATIO: 1.5
MICROALBUMIN/CREAT 24H UR: <12 MG/DL
MICROALBUMIN/CREAT UR-RTO: NORMAL MG/G
POTASSIUM SERPL-SCNC: 5.5 MEQ/L (ref 3.5–5.4)
SODIUM BLD-SCNC: 141 MEQ/L (ref 135–146)
TOTAL PROTEIN: 6.5 G/DL (ref 6.1–8.3)
TRIGL SERPL-MCNC: 185 MG/DL
TSH SERPL DL<=0.05 MIU/L-ACNC: 0.48 UIU/ML (ref 0.4–4.1)
VLDLC SERPL CALC-MCNC: 37 MG/DL

## 2018-07-05 LAB — VITAMIN D 1,25-DIHYDROXY: 31.6 PG/ML (ref 20–82)

## 2018-07-06 ENCOUNTER — HOSPITAL ENCOUNTER (OUTPATIENT)
Dept: PHYSICAL THERAPY | Age: 48
Setting detail: THERAPIES SERIES
Discharge: HOME | End: 2018-07-06
Payer: COMMERCIAL

## 2018-07-06 PROCEDURE — 97124 MASSAGE THERAPY: CPT

## 2018-07-06 PROCEDURE — 97110 THERAPEUTIC EXERCISES: CPT

## 2018-07-06 PROCEDURE — 97140 MANUAL THERAPY 1/> REGIONS: CPT

## 2018-07-06 ASSESSMENT — PAIN DESCRIPTION - LOCATION: LOCATION: NECK;HEAD;SHOULDER

## 2018-07-06 ASSESSMENT — PAIN DESCRIPTION - PAIN TYPE: TYPE: CHRONIC PAIN

## 2018-07-06 ASSESSMENT — PAIN SCALES - GENERAL: PAINLEVEL_OUTOF10: 7

## 2018-07-09 ENCOUNTER — HOSPITAL ENCOUNTER (OUTPATIENT)
Dept: PHYSICAL THERAPY | Age: 48
Setting detail: THERAPIES SERIES
Discharge: HOME | End: 2018-07-09
Payer: COMMERCIAL

## 2018-07-09 PROCEDURE — 97110 THERAPEUTIC EXERCISES: CPT

## 2018-07-09 PROCEDURE — 97140 MANUAL THERAPY 1/> REGIONS: CPT

## 2018-07-09 ASSESSMENT — PAIN SCALES - GENERAL: PAINLEVEL_OUTOF10: 3

## 2018-07-09 ASSESSMENT — PAIN DESCRIPTION - ORIENTATION: ORIENTATION: LEFT

## 2018-07-09 ASSESSMENT — PAIN DESCRIPTION - LOCATION: LOCATION: NECK

## 2018-07-09 ASSESSMENT — PAIN DESCRIPTION - PAIN TYPE: TYPE: CHRONIC PAIN

## 2018-07-09 NOTE — PROGRESS NOTES
[place]     Time In: 701  Time Out: 7927  Minutes: 38  Timed Code Treatment Minutes: 38 Minutes     Date: 2018  Patient Name: Priscilla Herr,  Gender:  female        CSN: [id number]   : 1970  (50 y.o.)       Referring Practitioner: Naomi Merrill CNP       Diagnosis: M48.02 (ICD-10-CM) - Cervical stenosis of spine  Treatment Diagnosis: neck pain with limited ROM and headaches. Additional Pertinent Hx: comorbidities:  DM,                   General:  PT Visit Information  Onset Date: 18  PT Insurance Information: [employer] 20 visits PT per calendar year, aquatic NA, modalities yes, no precertification  Total # of Visits to Date: 5  Plan of Care/Certification Expiration Date: 18  Progress Note Counter: 5/10 for PN               Subjective:  Chart Reviewed: Yes  Response To Previous Treatment: Patient with no complaints from previous session. Family / Caregiver Present: No  Comments: Follow up with not scheduled with CNP. Follow up as needed. Other (Comment): PT evaluation and treatment,  Dry needling technique also CT scan: cervical spine  Degenerative changes are present including disc space narrowing, endplate spurring and uncovertebral joint spurring. This results in mild spinal canal narrowing at C5-6 and C6-7. Subjective: Patient reports that she really does not have much of a headache. Neck is still a little stiff. I am able to do more. Pain:  Patient Currently in Pain: Yes  Pain Assessment: 0-10  Pain Level: 3  Pain Type: Chronic pain  Pain Location: Neck  Pain Orientation: Left  Pain Radiating Towards: denies of headache, suboccipital left >right soreness, mild left upper trap and left lateral cervical       Objective          Exercises  Exercise 2: Instruction in use of towel roll at lower border of pillow to improve sleeping posture/position.    Exercise 4: myofascial work and massage to bilateral cervical, upper traps, and rhomboids   region 10 minutes  Exercise 6: Dry needling to right  upper trap, left and right splenius capitus muscles, . see below for description. Dry needling manual therapy: consisted on the placement of one needle in the following muscles: right upper trapezius muscle 40 mm neede, splenius capitus right and left 40 mm needle each ,   needle was inserted, piston, rotated, and coned to produce intramuscular mobilization. These techniques were used to restore functional range of motion, reduce muscle spasm and induce healing in the corresponding musculature. (40246)  Clean Technique was utilized today while applying Dry needling treatment. The treatment sites where cleaned with 70% solution of isopropyl alcohol . The PT washed their hands and utilized treatment gloves along with hand  prior to inserting the needles. All needles where removed and discarded in the appropriate sharps container. Exercise 7:  upper cervical retraction x10 upper cervical flexion 5x , lateral flexion 3-5x hold 5-10 seconds,  levator and upper trap stretch 3x hold 5-10 seconds. Exercise 8: pec stretch in doorway 3x hold 15 seconds. Activity Tolerance:  Activity Tolerance: Patient Tolerated treatment well  Activity Tolerance: Decreased pain level from 3/10 to 0/10     Assessment: Body structures, Functions, Activity limitations: Decreased ROM  Assessment: Note ROM WNLS with just slight restriction with left rotation 10 degrees, lateral flexion WNLS.  0/10 pain level at end of session. Patient progressed with pec stretch in doorway. ROM ex tolerating well. Dry needling to right upper trap today instead of left upper trap, Splenius capitus muscles right/left. Progressing well in PT. Discharge Recommendations: Continue to assess pending progress    Patient Education:  Patient Education: Monitor symptoms after dry needling technique.

## 2018-07-13 ENCOUNTER — HOSPITAL ENCOUNTER (OUTPATIENT)
Dept: PHYSICAL THERAPY | Age: 48
Setting detail: THERAPIES SERIES
End: 2018-07-13
Payer: COMMERCIAL

## 2018-07-16 ENCOUNTER — HOSPITAL ENCOUNTER (OUTPATIENT)
Dept: PHYSICAL THERAPY | Age: 48
Setting detail: THERAPIES SERIES
End: 2018-07-16
Payer: COMMERCIAL

## 2018-07-16 NOTE — PROGRESS NOTES
[facility]  PHYSICAL THERAPY MISSED TREATMENT NOTE  OUTPATIENT REHABILITATION    Date: 2018  Patient Name: Pallavi Sousa        MRN: [id number]   : 1970  (50 y.o.)  Gender: female   Referring Practitioner: Bhavin Burch CNP                                           Cancellation note on 18   Diagnosis: M48.02 (ICD-10-CM) - Cervical stenosis of spine    PT Visit Information  Onset Date: 18  PT Insurance Information: [email or web address] [facility] 20 visits PT per calendar year, aquatic NA, modalities yes, no precertification  Total # of Visits to Date: 5  Plan of Care/Certification Expiration Date: 18  Canceled Appointment: 2  Progress Note Counter: 5/10 for PN    REASON FOR MISSED TREATMENT:  Patient called and cancelled appointment due to stomach hurting.  Alin Edwards, [address]

## 2018-07-17 ENCOUNTER — HOSPITAL ENCOUNTER (OUTPATIENT)
Dept: PHYSICAL THERAPY | Age: 48
Setting detail: THERAPIES SERIES
Discharge: HOME | End: 2018-07-17
Payer: COMMERCIAL

## 2018-07-17 PROCEDURE — 97110 THERAPEUTIC EXERCISES: CPT

## 2018-07-17 PROCEDURE — 97140 MANUAL THERAPY 1/> REGIONS: CPT

## 2018-07-17 ASSESSMENT — PAIN SCALES - GENERAL: PAINLEVEL_OUTOF10: 6

## 2018-07-17 ASSESSMENT — PAIN DESCRIPTION - LOCATION: LOCATION: HEAD;NECK

## 2018-07-17 ASSESSMENT — PAIN DESCRIPTION - PAIN TYPE: TYPE: CHRONIC PAIN

## 2018-07-19 ENCOUNTER — APPOINTMENT (OUTPATIENT)
Dept: PHYSICAL THERAPY | Age: 48
End: 2018-07-19
Payer: COMMERCIAL

## 2018-07-19 NOTE — PROGRESS NOTES
[facility]  PHYSICAL THERAPY MISSED TREATMENT NOTE  OUTPATIENT REHABILITATION    Date: 2018  Patient Name: Syeda Murillo        MRN: [id number]   : 1970  (50 y.o.)  Gender: female   Referring Practitioner: Sameera Lemon CNP   Diagnosis: M48.02 (ICD-10-CM) - Cervical stenosis of spine    PT Visit Information  Onset Date: 18  PT Insurance Information: [facility] 20 visits PT per calendar year, aquatic NA, modalities yes, no precertification  Total # of Visits to Date: 6  Plan of Care/Certification Expiration Date: 18  No Show: 1  Progress Note Counter: 6/10 for PN    REASON FOR MISSED TREATMENT:  Patient did not show for reassessment. Rescheduled for reassessment. Zeus Heredia, [address]
Objective                                   Exercises  Exercise 1: instruction in posture and position awareness. Use of towel roll at low back to address sitting posture and decreasing neck strain. Exercise 4: myofascial work and massage to bilateral cervical, upper traps, and rhomboids   region 10 minutes  Exercise 5: manual cervical traction, then neck retraction with therapist manually assisting. Suboccipital release 8 minutes, upper cervical traction 5 minutes, and lower cervical traction 3 minutes . upper cervical flexion mobilization with upper neck flexion x1 minute   Exercise 6: Dry needling to left  upper trap, left and right splenius capitus muscles, . see below for description. Dry needling manual therapy: consisted on the placement of one  needles in each of the  following muscles: splenius capitus right and left , left upper trapezius muscle. , . A 40  mm needle in each  was inserted, piston, rotated, and coned to produce intramuscular mobilization. These techniques were used to restore functional range of motion, reduce muscle spasm and induce healing in the corresponding musculature. (37468)  Clean Technique was utilized today while applying Dry needling treatment. The treatment sites where cleaned with 70% solution of isopropyl alcohol . The PT washed their hands and utilized treatment gloves along with hand  prior to inserting the needles. All needles where removed and discarded in the appropriate sharps container. Exercise 7:  cervical retraction x10 upper cervical flexion 5x , lateral flexion 3-5x hold 5-10 seconds,  levator and upper trap stretch 5x hold 5-10 seconds. Exercise 8: pec stretch in doorway 3x hold 15 seconds. Exercise 9: scap retraction, shoulder rolls, scap elevation x10 each. Exercise 10: cervical stabilization ex: ball against wall at posterior head: flexion, abduction, horizontal abduction, shoulder extension x10 each.           Activity

## 2018-07-27 ENCOUNTER — HOSPITAL ENCOUNTER (OUTPATIENT)
Dept: PHYSICAL THERAPY | Age: 48
Setting detail: THERAPIES SERIES
Discharge: HOME | End: 2018-07-27
Payer: COMMERCIAL

## 2018-07-27 PROCEDURE — 97110 THERAPEUTIC EXERCISES: CPT

## 2018-07-27 ASSESSMENT — PAIN DESCRIPTION - PAIN TYPE: TYPE: CHRONIC PAIN

## 2018-07-27 ASSESSMENT — PAIN DESCRIPTION - ORIENTATION: ORIENTATION: LEFT

## 2018-07-27 ASSESSMENT — PAIN DESCRIPTION - LOCATION: LOCATION: HEAD

## 2018-07-27 ASSESSMENT — PAIN SCALES - GENERAL: PAINLEVEL_OUTOF10: 5

## 2018-07-27 NOTE — PROGRESS NOTES
[place]     Time In: 8952  Time Out: 1450  Minutes: 48  Timed Code Treatment Minutes: 48 Minutes     Date: 2018  Patient Name: Syeda Murillo,  Gender:  female        CSN: [id number]   : 1970  (50 y.o.)       Referring Practitioner: Sameera Lemon CNP       Diagnosis: M48.02 (ICD-10-CM) - Cervical stenosis of spine  Treatment Diagnosis: neck pain with limited ROM and headaches. Additional Pertinent Hx: comorbidities:  DM,                   General:  PT Visit Information  Onset Date: 18  PT Insurance Information: [employer] 20 visits PT per calendar year, aquatic NA, modalities yes, no precertification  Total # of Visits to Date: 7  Plan of Care/Certification Expiration Date: 18  Progress Note Counter:  for PN               Subjective:  Chart Reviewed: Yes  Response To Previous Treatment: Patient with no complaints from previous session. Family / Caregiver Present: No  Comments: Follow up with not scheduled with CNP. Follow up as needed. Other (Comment): PT evaluation and treatment,  Dry needling technique also CT scan: cervical spine  Degenerative changes are present including disc space narrowing, endplate spurring and uncovertebral joint spurring. This results in mild spinal canal narrowing at C5-6 and C6-7. Subjective: Patient reports that she has been okay with less pain but has been under a lot stress with children. Patient feels that therapy has helped. She does not have headaches daily now. Notes less pain overall. Patient will be going on vacation.  She also has appointment with ENT for fluid left ear and right ear.,      Pain:  Patient Currently in Pain: Yes  Pain Assessment: 0-10  Pain Level: 5  Pain Type: Chronic pain  Pain Location: Head  Pain Orientation: Left  Pain Radiating Towards: dull headache, suboccipital tenderness and bilateral upper trap/left/right neck. Objective  Reassessment 7/27/18 Refer to goal summary. Neck Disability Index 12/50 @ 24%           Exercises  Exercise 1: instruction in posture and position awareness. Use of towel roll at low back to address sitting posture and decreasing neck strain. Exercise 2: Instruction in use of towel roll at lower border of pillow to improve sleeping posture/position. Exercise 4: myofascial work and massage to bilateral cervical, upper traps, and rhomboids   region 10 minutes NOT TODAY  Exercise 5: manual cervical traction, then neck retraction with therapist manually assisting. Suboccipital release 8 minutes, upper cervical traction 5 minutes, and lower cervical traction 3 minutes . upper cervical flexion mobilization with upper neck flexion x1 minute  NOT today  Exercise 6: Dry needling to left  upper trap, left and right splenius capitus muscles, . see below for description. NOT TODAY  Exercise 7:  cervical retraction x10 upper cervical flexion 5x , lateral flexion 3-5x hold 5-10 seconds,  levator and upper trap stretch 5x hold 5-10 seconds. HOME PROGRAM   Exercise 8: pec stretch in doorway 3x hold 15 seconds. HOME PROGRAM   Exercise 9: scap retraction, shoulder rolls, scap elevation x10 each. HOME PROGRAM   Exercise 10: cervical stabilization ex: ball against wall at posterior head: flexion, abduction, horizontal abduction, shoulder extension x10 each. HOME PROGRAM          Activity Tolerance:  Activity Tolerance: Patient Tolerated treatment well    Assessment:  Assessment: Reassesssment today. Refer to goal summary. Noting ROM in rotation WNLS, flexion WNLS, Lateral flexion WNLS. Improved posture awareness. Decreased frequency of headaches. Prognosis: Good       Patient Education:  Patient Education: Continue HEP. Patient issued revised ex program and instructed to avoid weights at gym lifting above shoulder level. Concentrate on posterior sholder girdle.

## 2018-07-31 ENCOUNTER — TELEPHONE (OUTPATIENT)
Dept: FAMILY MEDICINE CLINIC | Age: 48
End: 2018-07-31

## 2018-07-31 RX ORDER — AZITHROMYCIN 250 MG/1
TABLET, FILM COATED ORAL
Qty: 1 PACKET | Refills: 0 | Status: SHIPPED | OUTPATIENT
Start: 2018-07-31 | End: 2018-08-04

## 2018-07-31 RX ORDER — KETOCONAZOLE 20 MG/G
CREAM TOPICAL
Qty: 30 G | Refills: 1 | Status: SHIPPED | OUTPATIENT
Start: 2018-07-31 | End: 2019-12-09 | Stop reason: ALTCHOICE

## 2018-07-31 RX ORDER — FLUCONAZOLE 150 MG/1
150 TABLET ORAL
Qty: 3 TABLET | Refills: 0 | Status: SHIPPED | OUTPATIENT
Start: 2018-07-31 | End: 2018-08-10

## 2018-07-31 NOTE — TELEPHONE ENCOUNTER
Pt has ear pain, asking for antibiotic if possible, says she had Ct done and it showed fluid in her ear. Pt is leaving Thursday for vacation, and is flying. She is also asking for refill cream that had anti fungal  rx with diflucan. Pt still has yeast rash on legs. Offered appt pt said she cant make it.   [address] [address]   [facility]

## 2018-08-01 NOTE — TELEPHONE ENCOUNTER
Rx's sent to pharmacy per Isabella Philip. I attempted to contact the patient to notify her of Rx's at the pharmacy. Voicemail box is full. Will attempt to contact later.

## 2018-08-17 ENCOUNTER — OFFICE VISIT (OUTPATIENT)
Dept: ENT CLINIC | Age: 48
End: 2018-08-17
Payer: COMMERCIAL

## 2018-08-17 VITALS
HEART RATE: 72 BPM | SYSTOLIC BLOOD PRESSURE: 118 MMHG | BODY MASS INDEX: 40.88 KG/M2 | DIASTOLIC BLOOD PRESSURE: 76 MMHG | RESPIRATION RATE: 16 BRPM | WEIGHT: 230.7 LBS | HEIGHT: 63 IN | TEMPERATURE: 98.7 F

## 2018-08-17 DIAGNOSIS — M54.5 CHRONIC BILATERAL LOW BACK PAIN, WITH SCIATICA PRESENCE UNSPECIFIED: ICD-10-CM

## 2018-08-17 DIAGNOSIS — Z87.820 HISTORY OF CONCUSSION: ICD-10-CM

## 2018-08-17 DIAGNOSIS — G89.29 CHRONIC BILATERAL LOW BACK PAIN, WITH SCIATICA PRESENCE UNSPECIFIED: ICD-10-CM

## 2018-08-17 DIAGNOSIS — M54.2 CERVICALGIA: Primary | ICD-10-CM

## 2018-08-17 PROCEDURE — 99213 OFFICE O/P EST LOW 20 MIN: CPT | Performed by: OTOLARYNGOLOGY

## 2018-08-17 ASSESSMENT — ENCOUNTER SYMPTOMS
SHORTNESS OF BREATH: 0
STRIDOR: 0
VOMITING: 0
NAUSEA: 0
TROUBLE SWALLOWING: 0
FACIAL SWELLING: 0
SORE THROAT: 0
ABDOMINAL PAIN: 0
CHOKING: 0
COLOR CHANGE: 0
COUGH: 0
APNEA: 0
CHEST TIGHTNESS: 0
VOICE CHANGE: 0
WHEEZING: 0
SINUS PRESSURE: 1
RHINORRHEA: 0
DIARRHEA: 0

## 2018-08-17 NOTE — LETTER
[address] and [address] [address] [place] [address]  Preeti Ritter [address] [facility]  Phone: [phone]  Fax: [phone]    Aiden Martin MD        August 17, 2018    Lenin Sutton, [facility] [place] [address]    Patient: Loreta Monsalve   MR Number: [id number]   YOB: 1970   Date of Visit: 8/17/2018     Dear HAYLEE Rodriguez for referring your patient, Loreta Monsalve, regarding the abnormal findings on her CT of her head. I reviewed the CT, and after performing the history and physical on the patient, these radiologic findings in my opinion are not clinically significant. .     Below are the relevant portions of my assessment and plan of care. Assessment & Plan   Diagnoses and all orders for this visit:     Diagnosis Orders   1. Cervicalgia     2. History of concussion     3. Chronic bilateral low back pain, with sciatica presence unspecified         The findings were explained and her questions were answered. The patient has classical musculoskeletal pain at the insertion of the mastoid tip of the sternomastoid muscle. Getting appropriate physical therapy. Red light therapy possibly anti-inflammatory medications might be helpful. Findings on the CT are not clinically significant, and in my opinion. She appreciated the information and my explanation did fit well with her symptoms. Return As needed. If you have questions, please do not hesitate to call me. I look forward to following Marly along with you.     Sincerely,          Aiden Martin MD

## 2018-08-17 NOTE — PROGRESS NOTES
[address] [address], NOSE AND THROAT  [facility] 84  [facility] [address] [address]  Dept: [phone]  Dept Fax: [phone]  Loc: [phone]    Vanda Stanley is a 50 y.o. female who was referred by JERI Palacios Caro - * for:  Chief Complaint   Patient presents with    Other     New patient here for chronic mastoiditis, right side. Elissa Andrews HPI:     Vanda Stanley is a 50 y.o. female who presents today for Valuation of intermittent ear pain since an injury in April. She fell and hit her head on the floor at the gym without loss of consciousness. She has intermittent pain at the lower portion of her mastoid tip sometimes on one side sometimes on the other sometimes not at all. Her hearing is not affected. She has no dizziness. She is getting physical therapy to her neck including ultrasound, massage, heat and a type of needle therapy. She had a CT of her head which reportedly showed some possibly remote mastoiditis. I reviewed the CT in detail. There was a small amount of mucosal thickening inferiorly in the right mastoid. 90% of the mastoid is well aerated. There is no air-fluid level. The radiologist mentioned some coalescence, and I did not see any. History: Allergies   Allergen Reactions    Sulfa Antibiotics Itching    Vicodin [Hydrocodone-Acetaminophen] Nausea And Vomiting     Current Outpatient Prescriptions   Medication Sig Dispense Refill    DiphenhydrAMINE HCl (BENADRYL ALLERGY PO) Take by mouth      ketoconazole (NIZORAL) 2 % cream Apply topically daily.  30 g 1    escitalopram (LEXAPRO) 20 MG tablet take 1 tablet by mouth once daily 90 tablet 3    zolpidem (AMBIEN) 10 MG tablet take 1 tablet by mouth at bedtime if needed for sleep 30 tablet 2    metFORMIN (GLUCOPHAGE-XR) 500 MG extended release tablet Take 2 tablets by mouth daily (with breakfast) 60 tablet 3    Glucose Blood (BLOOD GLUCOSE TEST STRIPS) STRP Contour EZ test strips and lancets test daily E11.9 50 strip 11    albuterol sulfate HFA (PROAIR HFA) 108 (90 Base) MCG/ACT inhaler Inhale 2 puffs into the lungs every 6 hours as needed for Wheezing 1 Inhaler 3    butalbital-acetaminophen-caffeine (FIORICET, ESGIC) -40 MG per tablet Take 1 tablet by mouth every 4 hours as needed for Headaches 60 tablet 0    ondansetron (ZOFRAN) 4 MG tablet Take 1 tablet by mouth every 8 hours as needed for Nausea 60 tablet 1    cyclobenzaprine (FLEXERIL) 10 MG tablet Take 1 tablet by mouth 2 times daily as needed for Muscle spasms 60 tablet 1    fluticasone (FLONASE) 50 MCG/ACT nasal spray 1 spray by Nasal route daily 1 Bottle 3    omeprazole (PRILOSEC) 10 MG delayed release capsule Take 10 mg by mouth daily as needed       No current facility-administered medications for this visit. Past Medical History:   Diagnosis Date    Knee cartilage, torn, left     Type II or unspecified type diabetes mellitus without mention of complication, not stated as uncontrolled       Past Surgical History:   Procedure Laterality Date    COLONOSCOPY  2016    DILATION AND CURETTAGE OF UTERUS  04/17/2017    ENDOMETRIAL ABLATION      [facility]  07/12/2011     [facility]    JOINT REPLACEMENT  5/30/12    Right total knee      TUBAL LIGATION       Family History   Problem Relation Age of Onset    Heart Disease Mother         CHF    Cancer Father     COPD Father     Diabetes Father     High Blood Pressure Father     High Cholesterol Father      Social History   Substance Use Topics    Smoking status: Former Smoker     Quit date: 9/6/2000    Smokeless tobacco: Never Used    Alcohol use Yes      Comment: rarely       Subjective:      Review of Systems   Constitutional: Negative for activity change, appetite change, chills, diaphoresis, fatigue, fever and unexpected weight change. HENT: Positive for ear pain, sinus pressure and tinnitus.  Negative for swelling. Tympanic membrane is not scarred, not perforated and not erythematous. Tympanic membrane mobility is normal (on pneumatic massage). No middle ear effusion. Nose: Nose normal. No mucosal edema, rhinorrhea or septal deviation. Mouth/Throat: Uvula is midline, oropharynx is clear and moist and mucous membranes are normal. Mucous membranes are not pale and not dry. No oral lesions. No uvula swelling. No oropharyngeal exudate, posterior oropharyngeal edema or posterior oropharyngeal erythema. Turbinates: normal  LIps: lips normal    Teeth and gums:good dentition Mallampati 1  Tonsils:Unremarkable  Base of tongue: symmetric,  Larynx, mirror exam: unable due to gag reflex     Eyes: Right eye exhibits normal extraocular motion and no nystagmus. Left eye exhibits normal extraocular motion and no nystagmus. Conjugate gaze   Neck: Trachea normal and phonation normal. Neck supple. Muscular tenderness (bilateral mild tenderness at the insertion of the sternocleidomastoids on the mastoid tip. Mastoids themselves were not tender) present. No spinous process tenderness present. No tracheal deviation present. No thyroid mass and no thyromegaly present. No adenopathy. Salivary glands not enlarged and normal to palpation     Cardiovascular:   No murmur heard. Pulmonary/Chest: Breath sounds normal. No stridor. Neurological: She is alert and oriented to person, place, and time. No cranial nerve deficit (VIIth N function intact bilat). Psychiatric: She has a normal mood and affect. Nursing note and vitals reviewed. Data:  All of the past medical history, past surgical history, family history, social history, allergies and current medications were reviewed with the patient. Assessment & Plan   Diagnoses and all orders for this visit:     Diagnosis Orders   1. Cervicalgia     2. History of concussion     3.  Chronic bilateral low back pain, with sciatica presence unspecified         The findings were

## 2018-08-24 ENCOUNTER — OFFICE VISIT (OUTPATIENT)
Dept: FAMILY MEDICINE CLINIC | Age: 48
End: 2018-08-24
Payer: COMMERCIAL

## 2018-08-24 VITALS
SYSTOLIC BLOOD PRESSURE: 118 MMHG | TEMPERATURE: 98.9 F | BODY MASS INDEX: 40.36 KG/M2 | RESPIRATION RATE: 16 BRPM | HEART RATE: 88 BPM | HEIGHT: 63 IN | WEIGHT: 227.8 LBS | DIASTOLIC BLOOD PRESSURE: 84 MMHG

## 2018-08-24 DIAGNOSIS — H92.02 OTALGIA, LEFT EAR: Primary | ICD-10-CM

## 2018-08-24 DIAGNOSIS — M53.82: ICD-10-CM

## 2018-08-24 DIAGNOSIS — Z87.820 HISTORY OF CONCUSSION: ICD-10-CM

## 2018-08-24 DIAGNOSIS — M54.2 CERVICAL MUSCLE PAIN: ICD-10-CM

## 2018-08-24 DIAGNOSIS — M54.2 NECK PAIN: ICD-10-CM

## 2018-08-24 DIAGNOSIS — H70.12 CHRONIC MASTOIDITIS, LEFT EAR: ICD-10-CM

## 2018-08-24 PROCEDURE — 99213 OFFICE O/P EST LOW 20 MIN: CPT | Performed by: NURSE PRACTITIONER

## 2018-08-24 PROCEDURE — 96372 THER/PROPH/DIAG INJ SC/IM: CPT | Performed by: NURSE PRACTITIONER

## 2018-08-24 RX ORDER — METHYLPREDNISOLONE ACETATE 80 MG/ML
160 INJECTION, SUSPENSION INTRA-ARTICULAR; INTRALESIONAL; INTRAMUSCULAR; SOFT TISSUE ONCE
Status: COMPLETED | OUTPATIENT
Start: 2018-08-24 | End: 2018-08-24

## 2018-08-24 RX ORDER — OXYCODONE HYDROCHLORIDE AND ACETAMINOPHEN 5; 325 MG/1; MG/1
1 TABLET ORAL EVERY 4 HOURS PRN
Qty: 90 TABLET | Refills: 0 | Status: SHIPPED | OUTPATIENT
Start: 2018-08-24 | End: 2019-02-15 | Stop reason: SDUPTHER

## 2018-08-24 RX ADMIN — METHYLPREDNISOLONE ACETATE 160 MG: 80 INJECTION, SUSPENSION INTRA-ARTICULAR; INTRALESIONAL; INTRAMUSCULAR; SOFT TISSUE at 11:43

## 2018-08-24 NOTE — TELEPHONE ENCOUNTER
Patient in for an [address] today with Génesis James and stated she needs a refill of Percocet 5-325mg 1 tablet [id number] PRN sent to [facility]  Last written:07/31/2018 90 tablets with 0 refills  Last seen:08/24/2018, No future appointments  Rx verified,ordered and set to escribe

## 2018-08-28 ENCOUNTER — TELEPHONE (OUTPATIENT)
Dept: FAMILY MEDICINE CLINIC | Age: 48
End: 2018-08-28

## 2018-08-28 DIAGNOSIS — G47.00 INSOMNIA, UNSPECIFIED TYPE: Primary | ICD-10-CM

## 2018-08-28 NOTE — PROGRESS NOTES
knee      TUBAL LIGATION       Family History   Problem Relation Age of Onset    Heart Disease Mother         CHF    Cancer Father     COPD Father     Diabetes Father     High Blood Pressure Father     High Cholesterol Father      Social History   Substance Use Topics    Smoking status: Former Smoker     Quit date: 9/6/2000    Smokeless tobacco: Never Used    Alcohol use Yes      Comment: rarely      Current Outpatient Prescriptions   Medication Sig Dispense Refill    DiphenhydrAMINE HCl (BENADRYL ALLERGY PO) Take by mouth as needed (allergies)       ketoconazole (NIZORAL) 2 % cream Apply topically daily. 30 g 1    escitalopram (LEXAPRO) 20 MG tablet take 1 tablet by mouth once daily 90 tablet 3    metFORMIN (GLUCOPHAGE-XR) 500 MG extended release tablet Take 2 tablets by mouth daily (with breakfast) 60 tablet 3    Glucose Blood (BLOOD GLUCOSE TEST STRIPS) STRP Contour EZ test strips and lancets test daily E11.9 50 strip 11    ondansetron (ZOFRAN) 4 MG tablet Take 1 tablet by mouth every 8 hours as needed for Nausea 60 tablet 1    cyclobenzaprine (FLEXERIL) 10 MG tablet Take 1 tablet by mouth 2 times daily as needed for Muscle spasms 60 tablet 1    fluticasone (FLONASE) 50 MCG/ACT nasal spray 1 spray by Nasal route daily (Patient taking differently: 1 spray by Nasal route daily as needed for Allergies ) 1 Bottle 3    omeprazole (PRILOSEC) 10 MG delayed release capsule Take 10 mg by mouth daily as needed      oxyCODONE-acetaminophen (PERCOCET) 5-325 MG per tablet Take 1 tablet by mouth every 4 hours as needed for Pain for up to 30 days. . 90 tablet 0     No current facility-administered medications for this visit.       Allergies   Allergen Reactions    Sulfa Antibiotics Itching    Vicodin [Hydrocodone-Acetaminophen] Nausea And Vomiting     Health Maintenance   Topic Date Due    HIV screen  12/22/1983    DTaP/Tdap/Td vaccine (1 - Tdap) 12/22/1987    Pneumococcal med risk (1 of 1 - PPSV23) 12/22/1987    Cervical cancer screen  10/01/2015    Diabetic retinal exam  08/31/2018    Flu vaccine (1) 09/01/2018    Diabetic foot exam  01/01/2019    A1C test (Diabetic or Prediabetic)  07/02/2019    Diabetic microalbuminuria test  07/02/2019    Lipid screen  07/02/2019         Objective:     Physical Exam   Constitutional: She is oriented to person, place, and time. She appears well-developed and well-nourished. HENT:   Head: Normocephalic. Right Ear: External ear normal. No swelling or tenderness. Tympanic membrane is not perforated, not erythematous and not retracted. No middle ear effusion. No decreased hearing is noted. Left Ear: External ear normal. No swelling or tenderness. Tympanic membrane is not perforated, not erythematous and not retracted. No middle ear effusion. No decreased hearing is noted. Eyes: Pupils are equal, round, and reactive to light. Conjunctivae and EOM are normal. No scleral icterus. Neck: Neck supple. No JVD present. Spinous process tenderness and muscular tenderness present. Decreased range of motion present. No thyromegaly present. Cardiovascular: Normal rate, regular rhythm, normal heart sounds and intact distal pulses. Pulmonary/Chest: Effort normal and breath sounds normal.   Abdominal: Soft. Bowel sounds are normal.   Lymphadenopathy:     She has no cervical adenopathy. Neurological: She is alert and oriented to person, place, and time. No cranial nerve deficit. She exhibits normal muscle tone. Coordination normal.   Skin: Skin is warm and dry. Nursing note and vitals reviewed.     /84 (Site: Right Arm, Position: Sitting)   Pulse 88   Temp 98.9 °F (37.2 °C) (Oral)   Resp 16   Ht 5' 3\" (1.6 m)   Wt 227 lb 12.8 oz (103.3 kg)   BMI 40.35 kg/m²   : CT HEAD WO CONTRAST       CLINICAL INFORMATION: Fall, initial encounter, Concussion without loss of consciousness, initial encounter, New onset of headaches .       COMPARISON: No prior with treatment plan. Follow up as directed. Continue medications as prescribed.  Educational information on above diagnosis  provided per AVS.      Electronically signed by JERI Duff CNP on 8/28/2018 at 1:30 PM

## 2018-08-28 NOTE — TELEPHONE ENCOUNTER
Spoke with Loco at [facility] 's office-she is scheduled to see Dr. Rebeca Austin on 9/4/18 at 1:30 [facility] 68  [phone]. Will fax office note once completed. Left a message on pt's machine to call back to be informed.

## 2018-08-29 RX ORDER — ZOLPIDEM TARTRATE 10 MG/1
TABLET ORAL
Qty: 30 TABLET | Refills: 2 | Status: SHIPPED | OUTPATIENT
Start: 2018-08-29 | End: 2018-11-27

## 2018-08-29 ASSESSMENT — ENCOUNTER SYMPTOMS
GASTROINTESTINAL NEGATIVE: 1
EYES NEGATIVE: 1
RESPIRATORY NEGATIVE: 1

## 2018-09-10 ENCOUNTER — HOSPITAL ENCOUNTER (OUTPATIENT)
Dept: PHYSICAL THERAPY | Age: 48
Setting detail: THERAPIES SERIES
Discharge: HOME | End: 2018-09-10
Payer: COMMERCIAL

## 2018-09-13 ENCOUNTER — TELEPHONE (OUTPATIENT)
Dept: FAMILY MEDICINE CLINIC | Age: 48
End: 2018-09-13

## 2018-09-13 DIAGNOSIS — R53.83 FATIGUE, UNSPECIFIED TYPE: Primary | ICD-10-CM

## 2018-09-13 RX ORDER — METFORMIN HYDROCHLORIDE 500 MG/1
TABLET, EXTENDED RELEASE ORAL
Qty: 60 TABLET | Refills: 5 | Status: SHIPPED | OUTPATIENT
Start: 2018-09-13 | End: 2019-05-07 | Stop reason: SDUPTHER

## 2018-09-19 LAB
ESTRADIOL LEVEL: 46 PG/ML
PROGESTERONE LEVEL: 1.47 NG/ML
T4 FREE: 1.26 NG/DL (ref 0.8–1.9)

## 2018-09-20 ENCOUNTER — TELEPHONE (OUTPATIENT)
Dept: ENT CLINIC | Age: 48
End: 2018-09-20

## 2018-09-20 LAB
DHEAS (DHEA SULFATE): 21 UG/DL (ref 27–206)
T3 FREE: 2.5 PG/ML (ref 2.2–4.2)
THYROID PEROXIDASE ANTIBODY: 2 IU/ML
VITAMIN D 25-HYDROXY: 26 NG/ML

## 2018-09-20 NOTE — TELEPHONE ENCOUNTER
Patient called asking for a copy of her last visit note with the billing charges on it to submit to her insurance. I checked with Suyapa Juarez and she stated she can get it for the patient. Informed patient my manager would work on getting those for her. Asked if she wanted to pick it up or have it mailed, she asked to have it mailed to her. Verified address is correct in Epic.

## 2018-09-22 LAB — TESTOSTERONE, LCMS: 10 NG/DL (ref 9–55)

## 2018-09-27 ENCOUNTER — TELEPHONE (OUTPATIENT)
Dept: FAMILY MEDICINE CLINIC | Age: 48
End: 2018-09-27

## 2018-10-01 ENCOUNTER — HOSPITAL ENCOUNTER (OUTPATIENT)
Dept: GENERAL RADIOLOGY | Age: 48
Discharge: HOME | End: 2018-10-01
Payer: COMMERCIAL

## 2018-10-01 DIAGNOSIS — K95.09 COMPLICATION OF GASTRIC BAND PROCEDURE: ICD-10-CM

## 2018-10-01 PROCEDURE — 74246 X-RAY XM UPR GI TRC 2CNTRST: CPT

## 2018-10-01 PROCEDURE — A4641 RADIOPHARM DX AGENT NOC: HCPCS | Performed by: SURGERY

## 2018-10-01 PROCEDURE — 6370000000 HC RX 637 (ALT 250 FOR IP): Performed by: SURGERY

## 2018-10-01 PROCEDURE — 2500000003 HC RX 250 WO HCPCS: Performed by: SURGERY

## 2018-10-01 PROCEDURE — 6360000004 HC RX CONTRAST MEDICATION: Performed by: SURGERY

## 2018-10-01 RX ADMIN — BARIUM SULFATE 100 ML: 0.6 SUSPENSION ORAL at 08:54

## 2018-10-01 RX ADMIN — BARIUM SULFATE 140 ML: 980 POWDER, FOR SUSPENSION ORAL at 08:54

## 2018-10-01 RX ADMIN — ANTACID/ANTIFLATULENT 1 EACH: 380; 550; 10; 10 GRANULE, EFFERVESCENT ORAL at 08:55

## 2018-10-01 NOTE — TELEPHONE ENCOUNTER
Edda Hogan gave me a copy of the billing charges. I have printed out the office note and will mail these both out today.

## 2018-10-02 NOTE — TELEPHONE ENCOUNTER
Faxed Rx request from [facility] for Vitamin D 5000 units 2 capsules QD x 1 month, then 1 capsule QD; Compound drug: Progesterone 60mg, Testosterone 2mg, DHEA 15mg cream apply 0.5mL QM; hold for 5 days when Menses occurs  --Med list updated- will send Rx's through fax.

## 2018-12-10 ENCOUNTER — OFFICE VISIT (OUTPATIENT)
Dept: FAMILY MEDICINE CLINIC | Age: 48
End: 2018-12-10
Payer: COMMERCIAL

## 2018-12-10 VITALS
RESPIRATION RATE: 20 BRPM | BODY MASS INDEX: 42.23 KG/M2 | WEIGHT: 238.4 LBS | DIASTOLIC BLOOD PRESSURE: 70 MMHG | TEMPERATURE: 98 F | SYSTOLIC BLOOD PRESSURE: 130 MMHG | HEART RATE: 64 BPM

## 2018-12-10 DIAGNOSIS — J02.9 ACUTE PHARYNGITIS, UNSPECIFIED ETIOLOGY: ICD-10-CM

## 2018-12-10 DIAGNOSIS — H65.02 ACUTE SEROUS OTITIS MEDIA OF LEFT EAR, RECURRENCE NOT SPECIFIED: Primary | ICD-10-CM

## 2018-12-10 DIAGNOSIS — G47.00 INSOMNIA, UNSPECIFIED TYPE: ICD-10-CM

## 2018-12-10 PROCEDURE — 99213 OFFICE O/P EST LOW 20 MIN: CPT | Performed by: NURSE PRACTITIONER

## 2018-12-10 RX ORDER — CEFDINIR 300 MG/1
300 CAPSULE ORAL 2 TIMES DAILY
Qty: 20 CAPSULE | Refills: 0 | Status: SHIPPED | OUTPATIENT
Start: 2018-12-10 | End: 2018-12-20

## 2018-12-10 RX ORDER — FLUTICASONE PROPIONATE 50 MCG
1 SPRAY, SUSPENSION (ML) NASAL DAILY
Qty: 1 BOTTLE | Refills: 3 | Status: SHIPPED | OUTPATIENT
Start: 2018-12-10 | End: 2020-03-30 | Stop reason: SDUPTHER

## 2018-12-10 RX ORDER — ALPRAZOLAM 0.5 MG/1
0.5 TABLET ORAL NIGHTLY PRN
Qty: 30 TABLET | Refills: 0 | Status: SHIPPED | OUTPATIENT
Start: 2018-12-10 | End: 2019-01-16 | Stop reason: SDUPTHER

## 2018-12-10 RX ORDER — ALPRAZOLAM 0.5 MG/1
0.5 TABLET ORAL
COMMUNITY
Start: 2018-10-02 | End: 2018-12-10 | Stop reason: SDUPTHER

## 2018-12-10 ASSESSMENT — ENCOUNTER SYMPTOMS
SHORTNESS OF BREATH: 0
DIARRHEA: 0
ABDOMINAL PAIN: 0
NAUSEA: 0
CONSTIPATION: 0
COUGH: 1
SORE THROAT: 1
WHEEZING: 0

## 2018-12-10 ASSESSMENT — PATIENT HEALTH QUESTIONNAIRE - PHQ9
1. LITTLE INTEREST OR PLEASURE IN DOING THINGS: 0
SUM OF ALL RESPONSES TO PHQ9 QUESTIONS 1 & 2: 0
SUM OF ALL RESPONSES TO PHQ QUESTIONS 1-9: 0
SUM OF ALL RESPONSES TO PHQ QUESTIONS 1-9: 0
2. FEELING DOWN, DEPRESSED OR HOPELESS: 0

## 2018-12-10 NOTE — PROGRESS NOTES
spasms, Disp: 60 tablet, Rfl: 1    omeprazole (PRILOSEC) 10 MG delayed release capsule, Take 10 mg by mouth daily as needed, Disp: , Rfl:     The patient is allergic to sulfa antibiotics and vicodin [hydrocodone-acetaminophen]. Past Medical History  Ruddy Kitchen  has a past medical history of Knee cartilage, torn, left and Type II or unspecified type diabetes mellitus without mention of complication, not stated as uncontrolled. Subjective:      Review of Systems   Constitutional: Positive for fatigue. Negative for appetite change, diaphoresis and fever. HENT: Positive for congestion, ear pain and sore throat. Negative for tinnitus. Eyes: Negative for visual disturbance. Respiratory: Positive for cough. Negative for shortness of breath and wheezing. Cardiovascular: Negative for chest pain and leg swelling. Gastrointestinal: Negative for abdominal pain, constipation, diarrhea and nausea. Genitourinary: Negative for frequency. Musculoskeletal: Negative for arthralgias, myalgias and neck stiffness. Skin: Negative for rash. Neurological: Negative for dizziness, weakness and headaches. Psychiatric/Behavioral: The patient is not nervous/anxious. All other systems reviewed and are negative. Objective:     /70 (Site: Right Upper Arm)   Pulse 64   Temp 98 °F (36.7 °C) (Oral)   Resp 20   Wt 238 lb 6.4 oz (108.1 kg)   BMI 42.23 kg/m²     Physical Exam   Constitutional: She is oriented to person, place, and time. She appears well-developed and well-nourished. She is cooperative. She appears ill. No distress. HENT:   Right Ear: Hearing, tympanic membrane, external ear and ear canal normal.   Left Ear: Hearing, external ear and ear canal normal. Tympanic membrane is erythematous and retracted. Nose: Mucosal edema and rhinorrhea present. Right sinus exhibits no maxillary sinus tenderness. Left sinus exhibits no maxillary sinus tenderness.    Mouth/Throat: Uvula is midline, oropharynx is

## 2018-12-10 NOTE — PATIENT INSTRUCTIONS
Patient Education        Insomnia: Care Instructions  Your Care Instructions    Insomnia is the inability to sleep well. It is a common problem for most people at some time. Insomnia may make it hard for you to get to sleep, stay asleep, or sleep as long as you need to. This can make you tired and grouchy during the day. It can also make you forgetful, less effective at work, and unhappy. Insomnia can be caused by conditions such as depression or anxiety. Pain can also affect your ability to sleep. When these problems are solved, the insomnia usually clears up. But sometimes bad sleep habits can cause insomnia. If insomnia is affecting your work or your enjoyment of life, you can take steps to improve your sleep. Follow-up care is a key part of your treatment and safety. Be sure to make and go to all appointments, and call your doctor if you are having problems. It's also a good idea to know your test results and keep a list of the medicines you take. How can you care for yourself at home? What to avoid  · Do not have drinks with caffeine, such as coffee or black tea, for 8 hours before bed. · Do not smoke or use other types of tobacco near bedtime. Nicotine is a stimulant and can keep you awake. · Avoid drinking alcohol late in the evening, because it can cause you to wake in the middle of the night. · Do not eat a big meal close to bedtime. If you are hungry, eat a light snack. · Do not drink a lot of water close to bedtime, because the need to urinate may wake you up during the night. · Do not read or watch TV in bed. Use the bed only for sleeping and sexual activity. What to try  · Go to bed at the same time every night, and wake up at the same time every morning. Do not take naps during the day. · Keep your bedroom quiet, dark, and cool. · Sleep on a comfortable pillow and mattress. · If watching the clock makes you anxious, turn it facing away from you so you cannot see the time.   · If you worry when you lie down, start a worry book. Well before bedtime, write down your worries, and then set the book and your concerns aside. · Try meditation or other relaxation techniques before you go to bed. · If you cannot fall asleep, get up and go to another room until you feel sleepy. Do something relaxing. Repeat your bedtime routine before you go to bed again. · Make your house quiet and calm about an hour before bedtime. Turn down the lights, turn off the TV, log off the computer, and turn down the volume on music. This can help you relax after a busy day. When should you call for help? Watch closely for changes in your health, and be sure to contact your doctor if:    · Your efforts to improve your sleep do not work.     · Your insomnia gets worse.     · You have been feeling down, depressed, or hopeless or have lost interest in things that you usually enjoy. Where can you learn more? Go to https://MovigopeBTC.sx.[employer] org and sign in to your [employer] account. Enter P513 in the [employer] box to learn more about \"Insomnia: Care Instructions. \"     If you do not have an account, please click on the \"Sign Up Now\" link. Current as of: June 29, 2018  Content Version: 11.8  © 6944-6515 [employer]wise, Incorporated. Care instructions adapted under license by [facility] ([facility]). If you have questions about a medical condition or this instruction, always ask your healthcare professional. [place] [address] any warranty or liability for your use of this information. Patient Education        Middle Ear Fluid: Care Instructions  Your Care Instructions    Fluid often builds up inside the ear during a cold or allergies. Usually the fluid drains away, but sometimes a small tube in the ear, called the eustachian tube, stays blocked for months. Symptoms of fluid buildup may include:  · Popping, ringing, or a feeling of fullness or pressure in the ear.   · Trouble

## 2018-12-19 ENCOUNTER — TELEPHONE (OUTPATIENT)
Dept: FAMILY MEDICINE CLINIC | Age: 48
End: 2018-12-19

## 2019-01-16 DIAGNOSIS — G47.00 INSOMNIA, UNSPECIFIED TYPE: ICD-10-CM

## 2019-01-16 RX ORDER — ALPRAZOLAM 0.5 MG/1
TABLET ORAL
Qty: 30 TABLET | Refills: 0 | OUTPATIENT
Start: 2019-01-16 | End: 2019-02-15

## 2019-01-16 RX ORDER — ALPRAZOLAM 0.5 MG/1
0.5 TABLET ORAL NIGHTLY PRN
Qty: 30 TABLET | Refills: 0 | Status: SHIPPED | OUTPATIENT
Start: 2019-01-16 | End: 2019-02-15 | Stop reason: SDUPTHER

## 2019-02-15 ENCOUNTER — OFFICE VISIT (OUTPATIENT)
Dept: FAMILY MEDICINE CLINIC | Age: 49
End: 2019-02-15
Payer: COMMERCIAL

## 2019-02-15 VITALS
HEIGHT: 63 IN | RESPIRATION RATE: 20 BRPM | SYSTOLIC BLOOD PRESSURE: 118 MMHG | BODY MASS INDEX: 43.52 KG/M2 | WEIGHT: 245.6 LBS | HEART RATE: 103 BPM | OXYGEN SATURATION: 98 % | DIASTOLIC BLOOD PRESSURE: 82 MMHG | TEMPERATURE: 98.8 F

## 2019-02-15 DIAGNOSIS — E66.9 OBESITY, UNSPECIFIED CLASSIFICATION, UNSPECIFIED OBESITY TYPE, UNSPECIFIED WHETHER SERIOUS COMORBIDITY PRESENT: ICD-10-CM

## 2019-02-15 DIAGNOSIS — M15.9 PRIMARY OSTEOARTHRITIS INVOLVING MULTIPLE JOINTS: ICD-10-CM

## 2019-02-15 DIAGNOSIS — G89.29 CHRONIC NECK PAIN: ICD-10-CM

## 2019-02-15 DIAGNOSIS — E11.9 TYPE 2 DIABETES MELLITUS WITHOUT COMPLICATION, WITHOUT LONG-TERM CURRENT USE OF INSULIN (HCC): Primary | Chronic | ICD-10-CM

## 2019-02-15 DIAGNOSIS — M54.2 CHRONIC NECK PAIN: Primary | ICD-10-CM

## 2019-02-15 DIAGNOSIS — G89.29 CHRONIC NECK PAIN: Primary | ICD-10-CM

## 2019-02-15 DIAGNOSIS — F51.01 PRIMARY INSOMNIA: Chronic | ICD-10-CM

## 2019-02-15 DIAGNOSIS — M54.2 CHRONIC NECK PAIN: ICD-10-CM

## 2019-02-15 DIAGNOSIS — Z12.39 SCREENING FOR BREAST CANCER: ICD-10-CM

## 2019-02-15 DIAGNOSIS — G47.00 INSOMNIA, UNSPECIFIED TYPE: ICD-10-CM

## 2019-02-15 DIAGNOSIS — Z13.220 SCREENING CHOLESTEROL LEVEL: ICD-10-CM

## 2019-02-15 DIAGNOSIS — M67.441 GANGLION, FINGER OF RIGHT HAND: ICD-10-CM

## 2019-02-15 PROCEDURE — 99214 OFFICE O/P EST MOD 30 MIN: CPT | Performed by: NURSE PRACTITIONER

## 2019-02-15 RX ORDER — OXYCODONE HYDROCHLORIDE AND ACETAMINOPHEN 5; 325 MG/1; MG/1
1 TABLET ORAL EVERY 8 HOURS PRN
Qty: 50 TABLET | Refills: 0 | Status: SHIPPED | OUTPATIENT
Start: 2019-02-15 | End: 2019-05-09 | Stop reason: SDUPTHER

## 2019-02-15 RX ORDER — ALPRAZOLAM 0.5 MG/1
0.5 TABLET ORAL NIGHTLY PRN
Qty: 30 TABLET | Refills: 0 | Status: SHIPPED | OUTPATIENT
Start: 2019-02-15 | End: 2019-05-09 | Stop reason: SDUPTHER

## 2019-02-15 ASSESSMENT — PATIENT HEALTH QUESTIONNAIRE - PHQ9
2. FEELING DOWN, DEPRESSED OR HOPELESS: 0
SUM OF ALL RESPONSES TO PHQ QUESTIONS 1-9: 0
SUM OF ALL RESPONSES TO PHQ9 QUESTIONS 1 & 2: 0
SUM OF ALL RESPONSES TO PHQ QUESTIONS 1-9: 0
1. LITTLE INTEREST OR PLEASURE IN DOING THINGS: 0

## 2019-02-22 ASSESSMENT — ENCOUNTER SYMPTOMS
RESPIRATORY NEGATIVE: 1
GASTROINTESTINAL NEGATIVE: 1
EYES NEGATIVE: 1

## 2019-03-11 ENCOUNTER — TELEPHONE (OUTPATIENT)
Dept: FAMILY MEDICINE CLINIC | Age: 49
End: 2019-03-11

## 2019-03-11 DIAGNOSIS — F41.9 ANXIETY: Primary | ICD-10-CM

## 2019-03-11 DIAGNOSIS — N95.1 PERIMENOPAUSAL VASOMOTOR SYMPTOMS: ICD-10-CM

## 2019-03-11 DIAGNOSIS — R11.0 NAUSEA: ICD-10-CM

## 2019-03-27 LAB
ALBUMIN SERPL-MCNC: 4.6 G/DL (ref 3.5–5.2)
ALK PHOSPHATASE: 59 U/L (ref 30–103)
ALT SERPL-CCNC: 45 U/L (ref 5–40)
ANION GAP SERPL CALCULATED.3IONS-SCNC: 12 MEQ/L (ref 10–19)
AST SERPL-CCNC: 30 U/L (ref 9–40)
AVERAGE GLUCOSE: 126 MG/DL (ref 66–114)
BILIRUB SERPL-MCNC: 0.4 MG/DL
BUN BLDV-MCNC: 16 MG/DL (ref 8–23)
CALCIUM SERPL-MCNC: 10.3 MG/DL (ref 8.5–10.5)
CHLORIDE BLD-SCNC: 101 MEQ/L (ref 95–107)
CHOLESTEROL/HDL RATIO: 2.5
CHOLESTEROL: 162 MG/DL
CO2: 29 MEQ/L (ref 19–31)
CREAT SERPL-MCNC: 0.6 MG/DL (ref 0.6–1.3)
EGFR AFRICAN AMERICAN: 123.2 ML/MIN/1.73 M2
EGFR IF NONAFRICAN AMERICAN: 106.3 ML/MIN/1.73 M2
ESTRADIOL LEVEL: 77 PG/ML
GLUCOSE: 121 MG/DL (ref 70–99)
HBA1C MFR BLD: 6 %
HDLC SERPL-MCNC: 64.3 MG/DL
LDL CHOLESTEROL CALCULATED: 69 MG/DL
LDL/HDL RATIO: 1.1
POTASSIUM SERPL-SCNC: 5.8 MEQ/L (ref 3.5–5.4)
PROGESTERONE LEVEL: 0.75 NG/ML
SODIUM BLD-SCNC: 142 MEQ/L (ref 135–146)
T4 FREE: 1.06 NG/DL (ref 0.8–1.9)
TOTAL PROTEIN: 7.1 G/DL (ref 6.1–8.3)
TRIGL SERPL-MCNC: 143 MG/DL
TSH SERPL DL<=0.05 MIU/L-ACNC: 0.66 UIU/ML (ref 0.4–4.1)
VLDLC SERPL CALC-MCNC: 29 MG/DL

## 2019-03-28 LAB — DHEAS (DHEA SULFATE): 63 UG/DL (ref 35–256)

## 2019-03-29 LAB
ALBUMIN SERUM: 4.5 G/DL (ref 3.6–5.1)
SEX HORMONE BINDING GLOBULIN: 36.2 NMOL/L (ref 17.3–125)
TESTOSTERONE FREE: 4.5 PG/ML (ref 1.1–5.8)
TESTOSTERONE, LCMS: 27 NG/DL (ref 9–55)

## 2019-05-08 RX ORDER — METFORMIN HYDROCHLORIDE 500 MG/1
TABLET, EXTENDED RELEASE ORAL
Qty: 60 TABLET | Refills: 5 | Status: SHIPPED | OUTPATIENT
Start: 2019-05-08 | End: 2019-11-03 | Stop reason: SDUPTHER

## 2019-05-09 ENCOUNTER — OFFICE VISIT (OUTPATIENT)
Dept: FAMILY MEDICINE CLINIC | Age: 49
End: 2019-05-09
Payer: COMMERCIAL

## 2019-05-09 VITALS
TEMPERATURE: 98.8 F | BODY MASS INDEX: 42.49 KG/M2 | HEIGHT: 63 IN | WEIGHT: 239.8 LBS | DIASTOLIC BLOOD PRESSURE: 80 MMHG | SYSTOLIC BLOOD PRESSURE: 126 MMHG | HEART RATE: 92 BPM | RESPIRATION RATE: 18 BRPM

## 2019-05-09 DIAGNOSIS — R51.9 FRONTAL HEADACHE: ICD-10-CM

## 2019-05-09 DIAGNOSIS — F51.01 PRIMARY INSOMNIA: ICD-10-CM

## 2019-05-09 DIAGNOSIS — R51.9 OCCIPITAL HEADACHE: ICD-10-CM

## 2019-05-09 DIAGNOSIS — M54.2 CHRONIC NECK PAIN: ICD-10-CM

## 2019-05-09 DIAGNOSIS — M15.9 PRIMARY OSTEOARTHRITIS INVOLVING MULTIPLE JOINTS: ICD-10-CM

## 2019-05-09 DIAGNOSIS — Z79.890 HORMONE REPLACEMENT THERAPY, POSTMENOPAUSAL: ICD-10-CM

## 2019-05-09 DIAGNOSIS — N95.1 PERIMENOPAUSAL VASOMOTOR SYMPTOMS: ICD-10-CM

## 2019-05-09 DIAGNOSIS — G89.29 CHRONIC NECK PAIN: ICD-10-CM

## 2019-05-09 DIAGNOSIS — R74.8 ELEVATED LIVER ENZYMES: ICD-10-CM

## 2019-05-09 DIAGNOSIS — E11.9 TYPE 2 DIABETES MELLITUS WITHOUT COMPLICATION, WITHOUT LONG-TERM CURRENT USE OF INSULIN (HCC): ICD-10-CM

## 2019-05-09 DIAGNOSIS — E87.5 HYPERKALEMIA: ICD-10-CM

## 2019-05-09 DIAGNOSIS — B96.89 ACUTE BACTERIAL SINUSITIS: Primary | ICD-10-CM

## 2019-05-09 DIAGNOSIS — G47.00 INSOMNIA, UNSPECIFIED TYPE: ICD-10-CM

## 2019-05-09 DIAGNOSIS — J01.90 ACUTE BACTERIAL SINUSITIS: Primary | ICD-10-CM

## 2019-05-09 PROCEDURE — 99214 OFFICE O/P EST MOD 30 MIN: CPT | Performed by: NURSE PRACTITIONER

## 2019-05-09 RX ORDER — CEFUROXIME AXETIL 250 MG/1
250 TABLET ORAL 2 TIMES DAILY
Qty: 20 TABLET | Refills: 0 | Status: SHIPPED | OUTPATIENT
Start: 2019-05-09 | End: 2020-04-01 | Stop reason: SDUPTHER

## 2019-05-09 RX ORDER — LORATADINE 10 MG/1
10 TABLET ORAL DAILY
Qty: 30 TABLET | Refills: 5 | Status: SHIPPED | OUTPATIENT
Start: 2019-05-09 | End: 2020-06-17 | Stop reason: SDUPTHER

## 2019-05-09 RX ORDER — FLUCONAZOLE 150 MG/1
150 TABLET ORAL
Qty: 3 TABLET | Refills: 0 | Status: SHIPPED | OUTPATIENT
Start: 2019-05-09 | End: 2020-04-01 | Stop reason: SDUPTHER

## 2019-05-09 RX ORDER — ALPRAZOLAM 0.5 MG/1
0.5 TABLET ORAL NIGHTLY PRN
Qty: 30 TABLET | Refills: 0 | Status: SHIPPED | OUTPATIENT
Start: 2019-05-09 | End: 2019-07-13 | Stop reason: SDUPTHER

## 2019-05-09 RX ORDER — ESCITALOPRAM OXALATE 20 MG/1
TABLET ORAL
Qty: 90 TABLET | Refills: 3 | Status: SHIPPED | OUTPATIENT
Start: 2019-05-09 | End: 2019-12-09 | Stop reason: SDUPTHER

## 2019-05-09 RX ORDER — OXYCODONE HYDROCHLORIDE AND ACETAMINOPHEN 5; 325 MG/1; MG/1
1 TABLET ORAL EVERY 8 HOURS PRN
Qty: 50 TABLET | Refills: 0 | Status: SHIPPED | OUTPATIENT
Start: 2019-05-09 | End: 2019-06-08

## 2019-05-09 RX ORDER — LORATADINE 10 MG/1
10 TABLET ORAL DAILY
COMMUNITY
End: 2019-05-09 | Stop reason: SDUPTHER

## 2019-05-09 RX ORDER — KETOROLAC TROMETHAMINE 10 MG/1
10 TABLET, FILM COATED ORAL EVERY 6 HOURS PRN
Qty: 20 TABLET | Refills: 0 | Status: SHIPPED | OUTPATIENT
Start: 2019-05-09 | End: 2019-08-28 | Stop reason: ALTCHOICE

## 2019-05-09 NOTE — TELEPHONE ENCOUNTER
Patient in for an OV with Meagan Nguyen today and is asking for a refill on Percocet 5-325mg  Last written:02/15/2019 50 tablets with 0 refills  Rx verified,ordered,and set to escribe

## 2019-06-11 NOTE — PROGRESS NOTES
Relation Age of Onset    Heart Disease Mother         CHF    Cancer Father     COPD Father     Diabetes Father     High Blood Pressure Father     High Cholesterol Father      Social History     Tobacco Use    Smoking status: Former Smoker     Last attempt to quit: 2000     Years since quittin.7    Smokeless tobacco: Never Used   Substance Use Topics    Alcohol use: Yes     Comment: rarely      Current Outpatient Medications   Medication Sig Dispense Refill    loratadine (CLARITIN) 10 MG tablet Take 1 tablet by mouth daily 30 tablet 5    escitalopram (LEXAPRO) 20 MG tablet take 1 tablet by mouth once daily 90 tablet 3    ketorolac (TORADOL) 10 MG tablet Take 1 tablet by mouth every 6 hours as needed for Pain 20 tablet 0    metFORMIN (GLUCOPHAGE-XR) 500 MG extended release tablet take 2 tablets by mouth every morning 60 tablet 5    vitamin D (CHOLECALCIFEROL) 5000 units CAPS capsule Take 1 capsule by mouth daily 90 capsule 3    fluticasone (FLONASE) 50 MCG/ACT nasal spray 1 spray by Nasal route daily 1 Bottle 3    DiphenhydrAMINE HCl (BENADRYL ALLERGY PO) Take by mouth as needed (allergies)       ketoconazole (NIZORAL) 2 % cream Apply topically daily. 30 g 1    Glucose Blood (BLOOD GLUCOSE TEST STRIPS) STRP Contour EZ test strips and lancets test daily E11.9 50 strip 11    ondansetron (ZOFRAN) 4 MG tablet Take 1 tablet by mouth every 8 hours as needed for Nausea 60 tablet 1    cyclobenzaprine (FLEXERIL) 10 MG tablet Take 1 tablet by mouth 2 times daily as needed for Muscle spasms 60 tablet 1     No current facility-administered medications for this visit.       Allergies   Allergen Reactions    Sulfa Antibiotics Itching    Vicodin [Hydrocodone-Acetaminophen] Nausea And Vomiting     Health Maintenance   Topic Date Due    Pneumococcal 0-64 years Vaccine (1 of 1 - PPSV23) 1974    HIV screen  1983    Hepatitis B Vaccine (1 of 3 - Risk 3-dose series) 1987    DTaP/Tdap/Td vaccine (1 - Tdap) 12/22/1987    Cervical cancer screen  10/01/2015    Breast cancer screen  12/22/2018    Shingles Vaccine (1 of 2) 12/22/2018    Diabetic microalbuminuria test  07/02/2019    Flu vaccine (Season Ended) 09/01/2019    Diabetic foot exam  02/22/2020    A1C test (Diabetic or Prediabetic)  03/26/2020    Lipid screen  03/26/2020    Diabetic retinal exam  04/04/2020    Colon cancer screen colonoscopy  03/08/2026         Objective:     Physical Exam   Constitutional: She is oriented to person, place, and time. She appears well-developed and well-nourished. HENT:   Head: Normocephalic. Right Ear: External ear normal.   Left Ear: External ear normal.   Nose: Nose normal.   Mouth/Throat: Oropharynx is clear and moist.   Eyes: Pupils are equal, round, and reactive to light. Conjunctivae and EOM are normal.   Neck: Normal range of motion. Neck supple. Cardiovascular: Normal rate, regular rhythm, normal heart sounds and intact distal pulses. Pulmonary/Chest: Effort normal and breath sounds normal.   Abdominal: Soft. Bowel sounds are normal.   Musculoskeletal: Normal range of motion. Neurological: She is alert and oriented to person, place, and time. No cranial nerve deficit. She exhibits normal muscle tone. Coordination normal.   Skin: Skin is warm and dry. Psychiatric: She has a normal mood and affect. Judgment and thought content normal. Her speech is rapid and/or pressured. She is hyperactive. Cognition and memory are normal. She expresses no suicidal ideation. Nursing note and vitals reviewed. /80 (Site: Right Upper Arm, Position: Sitting)   Pulse 92   Temp 98.8 °F (37.1 °C) (Oral)   Resp 18   Ht 5' 3\" (1.6 m)   Wt 239 lb 12.8 oz (108.8 kg)   BMI 42.48 kg/m²       Impression/Plan:  1. Acute bacterial sinusitis    2. Insomnia, unspecified type    3. Type 2 diabetes mellitus without complication, without long-term current use of insulin ([address]    4.  Primary osteoarthritis involving multiple joints    5. Primary insomnia    6. Frontal headache    7. Occipital headache    8. Elevated liver enzymes    9. Hyperkalemia    10. Perimenopausal vasomotor symptoms    11. Hormone replacement therapy, postmenopausal      Requested Prescriptions     Signed Prescriptions Disp Refills    loratadine (CLARITIN) 10 MG tablet 30 tablet 5     Sig: Take 1 tablet by mouth daily    ALPRAZolam (XANAX) 0.5 MG tablet 30 tablet 0     Sig: Take 1 tablet by mouth nightly as needed for Sleep for up to 30 days.  escitalopram (LEXAPRO) 20 MG tablet 90 tablet 3     Sig: take 1 tablet by mouth once daily    cefUROXime (CEFTIN) 250 MG tablet 20 tablet 0     Sig: Take 1 tablet by mouth 2 times daily for 10 days    fluconazole (DIFLUCAN) 150 MG tablet 3 tablet 0     Sig: Take 1 tablet by mouth every 3 days for 10 days    ketorolac (TORADOL) 10 MG tablet 20 tablet 0     Sig: Take 1 tablet by mouth every 6 hours as needed for Pain     Orders Placed This Encounter   Procedures    Basic Metabolic Panel     Standing Status:   Future     Standing Expiration Date:   5/8/2020    AST     Standing Status:   Future     Standing Expiration Date:   5/8/2020    ALT     Standing Status:   Future     Standing Expiration Date:   5/8/2020       Patient giveneducational materials - see patient instructions. Discussed use, benefit, and side effects of prescribed medications. All patient questions answered. Pt voiced understanding. Reviewed health maintenance. Patient agreedwith treatment plan. Follow up as directed. Continue medications as prescribed. Educational information on above diagnosis  provided per AVS.  [facility] with [facility] regarding hormone replacement therapy. Recommend to take Estroven over-the-counter.     Electronically signed by JERI Nunn CNP on 6/11/2019 at 1:16 PM

## 2019-07-13 DIAGNOSIS — G47.00 INSOMNIA, UNSPECIFIED TYPE: ICD-10-CM

## 2019-07-15 RX ORDER — ALPRAZOLAM 0.5 MG/1
TABLET ORAL
Qty: 30 TABLET | Refills: 0 | Status: SHIPPED | OUTPATIENT
Start: 2019-07-15 | End: 2019-08-28 | Stop reason: SDUPTHER

## 2019-07-18 ENCOUNTER — TELEPHONE (OUTPATIENT)
Dept: FAMILY MEDICINE CLINIC | Age: 49
End: 2019-07-18

## 2019-07-18 ENCOUNTER — PATIENT MESSAGE (OUTPATIENT)
Dept: FAMILY MEDICINE CLINIC | Age: 49
End: 2019-07-18

## 2019-07-19 RX ORDER — ALBUTEROL SULFATE 90 UG/1
2 AEROSOL, METERED RESPIRATORY (INHALATION) EVERY 6 HOURS PRN
Qty: 1 INHALER | Refills: 3 | Status: SHIPPED | OUTPATIENT
Start: 2019-07-19 | End: 2019-12-09 | Stop reason: SDUPTHER

## 2019-07-19 NOTE — TELEPHONE ENCOUNTER
From: Mary Pace  To: JERI Parks CNP  Sent: 2019 5:56 PM EDT  Subject: Prescription Question    My inhaler is , can I have a refill please?

## 2019-07-22 ENCOUNTER — HOSPITAL ENCOUNTER (OUTPATIENT)
Dept: WOMENS IMAGING | Age: 49
Discharge: HOME | End: 2019-07-22
Payer: COMMERCIAL

## 2019-07-22 DIAGNOSIS — Z12.39 SCREENING FOR BREAST CANCER: ICD-10-CM

## 2019-07-22 PROCEDURE — 77063 BREAST TOMOSYNTHESIS BI: CPT

## 2019-07-24 ENCOUNTER — HOSPITAL ENCOUNTER (OUTPATIENT)
Dept: WOMENS IMAGING | Age: 49
Discharge: HOME | End: 2019-07-24
Payer: COMMERCIAL

## 2019-07-24 DIAGNOSIS — R92.1 CALCIFICATION OF LEFT BREAST: ICD-10-CM

## 2019-07-24 PROCEDURE — G0279 TOMOSYNTHESIS, MAMMO: HCPCS

## 2019-08-06 ENCOUNTER — HOSPITAL ENCOUNTER (OUTPATIENT)
Dept: WOMENS IMAGING | Age: 49
Discharge: HOME | End: 2019-08-06
Payer: COMMERCIAL

## 2019-08-06 DIAGNOSIS — R92.1 BREAST CALCIFICATIONS: ICD-10-CM

## 2019-08-06 PROCEDURE — 77065 DX MAMMO INCL CAD UNI: CPT

## 2019-08-06 PROCEDURE — 19081 BX BREAST 1ST LESION STRTCTC: CPT

## 2019-08-06 PROCEDURE — 2709999900 HC NON-CHARGEABLE SUPPLY

## 2019-08-06 PROCEDURE — 2720000010 HC SURG SUPPLY STERILE

## 2019-08-06 PROCEDURE — 88342 IMHCHEM/IMCYTCHM 1ST ANTB: CPT

## 2019-08-06 PROCEDURE — 88377 M/PHMTRC ALYS ISHQUANT/SEMIQ: CPT

## 2019-08-06 PROCEDURE — 88341 IMHCHEM/IMCYTCHM EA ADD ANTB: CPT

## 2019-08-06 PROCEDURE — 19082 BX BREAST ADD LESION STRTCTC: CPT

## 2019-08-06 PROCEDURE — A4648 IMPLANTABLE TISSUE MARKER: HCPCS

## 2019-08-06 PROCEDURE — 88305 TISSUE EXAM BY PATHOLOGIST: CPT

## 2019-08-06 PROCEDURE — 88360 TUMOR IMMUNOHISTOCHEM/MANUAL: CPT

## 2019-08-07 ENCOUNTER — CLINICAL DOCUMENTATION (OUTPATIENT)
Dept: WOMENS IMAGING | Age: 49
End: 2019-08-07

## 2019-08-08 ENCOUNTER — CLINICAL DOCUMENTATION (OUTPATIENT)
Dept: WOMENS IMAGING | Age: 49
End: 2019-08-08

## 2019-08-22 DIAGNOSIS — G47.00 INSOMNIA, UNSPECIFIED TYPE: ICD-10-CM

## 2019-08-22 RX ORDER — ALPRAZOLAM 0.5 MG/1
TABLET ORAL
Qty: 30 TABLET | Refills: 0 | OUTPATIENT
Start: 2019-08-22 | End: 2019-09-21

## 2019-08-28 ENCOUNTER — OFFICE VISIT (OUTPATIENT)
Dept: FAMILY MEDICINE CLINIC | Age: 49
End: 2019-08-28
Payer: COMMERCIAL

## 2019-08-28 VITALS
RESPIRATION RATE: 18 BRPM | BODY MASS INDEX: 42.3 KG/M2 | DIASTOLIC BLOOD PRESSURE: 74 MMHG | TEMPERATURE: 98.1 F | WEIGHT: 238.8 LBS | HEART RATE: 72 BPM | SYSTOLIC BLOOD PRESSURE: 136 MMHG

## 2019-08-28 DIAGNOSIS — N95.1 PERIMENOPAUSAL VASOMOTOR SYMPTOMS: ICD-10-CM

## 2019-08-28 DIAGNOSIS — G47.00 INSOMNIA, UNSPECIFIED TYPE: ICD-10-CM

## 2019-08-28 DIAGNOSIS — F41.8 SITUATIONAL ANXIETY: ICD-10-CM

## 2019-08-28 DIAGNOSIS — F51.01 PRIMARY INSOMNIA: Primary | ICD-10-CM

## 2019-08-28 DIAGNOSIS — C50.912 INFILTRATING DUCTAL CARCINOMA OF LEFT BREAST (HCC): ICD-10-CM

## 2019-08-28 PROCEDURE — 99214 OFFICE O/P EST MOD 30 MIN: CPT | Performed by: NURSE PRACTITIONER

## 2019-08-28 RX ORDER — ALPRAZOLAM 0.5 MG/1
0.5 TABLET ORAL 3 TIMES DAILY PRN
Qty: 90 TABLET | Refills: 0 | Status: SHIPPED | OUTPATIENT
Start: 2019-08-28 | End: 2019-11-27 | Stop reason: SDUPTHER

## 2019-08-28 RX ORDER — ALPRAZOLAM 0.5 MG/1
0.5 TABLET ORAL
COMMUNITY
End: 2019-12-09 | Stop reason: SDUPTHER

## 2019-09-05 ASSESSMENT — ENCOUNTER SYMPTOMS
CHEST TIGHTNESS: 0
EYE DISCHARGE: 0
APNEA: 0
ABDOMINAL DISTENTION: 0
PHOTOPHOBIA: 0
SHORTNESS OF BREATH: 0
COUGH: 0
ABDOMINAL PAIN: 0
EYE PAIN: 0
RHINORRHEA: 0
BACK PAIN: 0
WHEEZING: 0

## 2019-09-06 ENCOUNTER — NURSE ONLY (OUTPATIENT)
Dept: LAB | Age: 49
End: 2019-09-06

## 2019-09-16 ENCOUNTER — TELEPHONE (OUTPATIENT)
Dept: SPIRITUAL SERVICES | Facility: CLINIC | Age: 49
End: 2019-09-16

## 2019-10-07 ENCOUNTER — TELEPHONE (OUTPATIENT)
Dept: FAMILY MEDICINE CLINIC | Age: 49
End: 2019-10-07

## 2019-10-07 DIAGNOSIS — C50.919 MALIGNANT NEOPLASM OF FEMALE BREAST, UNSPECIFIED ESTROGEN RECEPTOR STATUS, UNSPECIFIED LATERALITY, UNSPECIFIED SITE OF BREAST (HCC): Primary | ICD-10-CM

## 2019-10-07 DIAGNOSIS — F41.9 ANXIETY: ICD-10-CM

## 2019-10-08 RX ORDER — CLONAZEPAM 0.5 MG/1
.5-1 TABLET ORAL 3 TIMES DAILY PRN
Qty: 90 TABLET | Refills: 0 | Status: SHIPPED | OUTPATIENT
Start: 2019-10-08 | End: 2020-06-15 | Stop reason: ALTCHOICE

## 2019-11-04 RX ORDER — METFORMIN HYDROCHLORIDE 500 MG/1
TABLET, EXTENDED RELEASE ORAL
Qty: 180 TABLET | Refills: 2 | Status: SHIPPED | OUTPATIENT
Start: 2019-11-04 | End: 2020-05-08

## 2019-11-27 DIAGNOSIS — G47.00 INSOMNIA, UNSPECIFIED TYPE: ICD-10-CM

## 2019-11-27 RX ORDER — ALPRAZOLAM 0.5 MG/1
TABLET ORAL
Qty: 90 TABLET | Refills: 0 | Status: SHIPPED | OUTPATIENT
Start: 2019-11-27 | End: 2020-02-17

## 2019-12-09 ENCOUNTER — OFFICE VISIT (OUTPATIENT)
Dept: FAMILY MEDICINE CLINIC | Age: 49
End: 2019-12-09
Payer: COMMERCIAL

## 2019-12-09 VITALS
WEIGHT: 232.8 LBS | BODY MASS INDEX: 41.24 KG/M2 | DIASTOLIC BLOOD PRESSURE: 80 MMHG | RESPIRATION RATE: 16 BRPM | TEMPERATURE: 98.4 F | SYSTOLIC BLOOD PRESSURE: 110 MMHG | HEART RATE: 98 BPM

## 2019-12-09 DIAGNOSIS — F41.8 SITUATIONAL ANXIETY: ICD-10-CM

## 2019-12-09 DIAGNOSIS — C50.912 INFILTRATING DUCTAL CARCINOMA OF LEFT BREAST (HCC): Primary | ICD-10-CM

## 2019-12-09 DIAGNOSIS — F51.01 PRIMARY INSOMNIA: ICD-10-CM

## 2019-12-09 DIAGNOSIS — S21.002S BREAST WOUND, LEFT, SEQUELA: ICD-10-CM

## 2019-12-09 DIAGNOSIS — Z90.12 S/P LEFT MASTECTOMY: ICD-10-CM

## 2019-12-09 PROCEDURE — 90471 IMMUNIZATION ADMIN: CPT | Performed by: NURSE PRACTITIONER

## 2019-12-09 PROCEDURE — 90688 IIV4 VACCINE SPLT 0.5 ML IM: CPT | Performed by: NURSE PRACTITIONER

## 2019-12-09 PROCEDURE — 99213 OFFICE O/P EST LOW 20 MIN: CPT | Performed by: NURSE PRACTITIONER

## 2019-12-09 RX ORDER — ALBUTEROL SULFATE 90 UG/1
2 AEROSOL, METERED RESPIRATORY (INHALATION) EVERY 6 HOURS PRN
Qty: 1 INHALER | Refills: 3 | Status: SHIPPED | OUTPATIENT
Start: 2019-12-09 | End: 2022-07-20

## 2019-12-09 RX ORDER — ESCITALOPRAM OXALATE 20 MG/1
TABLET ORAL
Qty: 90 TABLET | Refills: 3 | Status: SHIPPED | OUTPATIENT
Start: 2019-12-09 | End: 2020-09-04

## 2020-01-09 LAB
AVERAGE GLUCOSE: 120
HBA1C MFR BLD: 5.8 %

## 2020-01-30 ENCOUNTER — OFFICE VISIT (OUTPATIENT)
Dept: FAMILY MEDICINE CLINIC | Age: 50
End: 2020-01-30
Payer: COMMERCIAL

## 2020-01-30 ENCOUNTER — NURSE ONLY (OUTPATIENT)
Dept: LAB | Age: 50
End: 2020-01-30

## 2020-01-30 VITALS
SYSTOLIC BLOOD PRESSURE: 135 MMHG | WEIGHT: 235.2 LBS | RESPIRATION RATE: 16 BRPM | HEART RATE: 72 BPM | BODY MASS INDEX: 41.66 KG/M2 | DIASTOLIC BLOOD PRESSURE: 76 MMHG

## 2020-01-30 LAB
BASOPHILS # BLD: 1.2 %
BASOPHILS ABSOLUTE: 0.1 THOU/MM3 (ref 0–0.1)
EOSINOPHIL # BLD: 2.8 %
EOSINOPHILS ABSOLUTE: 0.3 THOU/MM3 (ref 0–0.4)
ERYTHROCYTE [DISTWIDTH] IN BLOOD BY AUTOMATED COUNT: 15.4 % (ref 11.5–14.5)
ERYTHROCYTE [DISTWIDTH] IN BLOOD BY AUTOMATED COUNT: 45.6 FL (ref 35–45)
FOLATE: 14.4 NG/ML (ref 4.8–24.2)
HCT VFR BLD CALC: 45.1 % (ref 37–47)
HEMOGLOBIN: 13.5 GM/DL (ref 12–16)
IMMATURE GRANS (ABS): 0.04 THOU/MM3 (ref 0–0.07)
IMMATURE GRANULOCYTES: 0.4 %
LYMPHOCYTES # BLD: 24.3 %
LYMPHOCYTES ABSOLUTE: 2.2 THOU/MM3 (ref 1–4.8)
MCH RBC QN AUTO: 25.2 PG (ref 26–33)
MCHC RBC AUTO-ENTMCNC: 29.9 GM/DL (ref 32.2–35.5)
MCV RBC AUTO: 84.3 FL (ref 81–99)
MONOCYTES # BLD: 5.8 %
MONOCYTES ABSOLUTE: 0.5 THOU/MM3 (ref 0.4–1.3)
NUCLEATED RED BLOOD CELLS: 0 /100 WBC
PLATELET # BLD: 675 THOU/MM3 (ref 130–400)
PMV BLD AUTO: 10.5 FL (ref 9.4–12.4)
RBC # BLD: 5.35 MILL/MM3 (ref 4.2–5.4)
SEG NEUTROPHILS: 65.5 %
SEGMENTED NEUTROPHILS ABSOLUTE COUNT: 6 THOU/MM3 (ref 1.8–7.7)
VITAMIN B-12: > 2000 PG/ML (ref 211–911)
VITAMIN D 25-HYDROXY: 47 NG/ML (ref 30–100)
WBC # BLD: 9.2 THOU/MM3 (ref 4.8–10.8)

## 2020-01-30 PROCEDURE — 96372 THER/PROPH/DIAG INJ SC/IM: CPT | Performed by: NURSE PRACTITIONER

## 2020-01-30 PROCEDURE — 99214 OFFICE O/P EST MOD 30 MIN: CPT | Performed by: NURSE PRACTITIONER

## 2020-01-30 RX ORDER — CYCLOBENZAPRINE HCL 10 MG
10 TABLET ORAL 2 TIMES DAILY PRN
Qty: 60 TABLET | Refills: 1 | Status: SHIPPED | OUTPATIENT
Start: 2020-01-30 | End: 2020-03-02

## 2020-01-30 RX ORDER — CYANOCOBALAMIN 1000 UG/ML
1000 INJECTION INTRAMUSCULAR; SUBCUTANEOUS ONCE
Status: COMPLETED | OUTPATIENT
Start: 2020-01-30 | End: 2020-01-30

## 2020-01-30 RX ORDER — PRAMIPEXOLE DIHYDROCHLORIDE 0.12 MG/1
0.12 TABLET ORAL NIGHTLY
Qty: 30 TABLET | Refills: 1 | Status: SHIPPED | OUTPATIENT
Start: 2020-01-30 | End: 2020-02-18 | Stop reason: DRUGHIGH

## 2020-01-30 RX ADMIN — CYANOCOBALAMIN 1000 MCG: 1000 INJECTION INTRAMUSCULAR; SUBCUTANEOUS at 15:26

## 2020-01-30 ASSESSMENT — PATIENT HEALTH QUESTIONNAIRE - PHQ9
1. LITTLE INTEREST OR PLEASURE IN DOING THINGS: 0
2. FEELING DOWN, DEPRESSED OR HOPELESS: 0
SUM OF ALL RESPONSES TO PHQ9 QUESTIONS 1 & 2: 0
SUM OF ALL RESPONSES TO PHQ QUESTIONS 1-9: 0
SUM OF ALL RESPONSES TO PHQ QUESTIONS 1-9: 0

## 2020-01-30 NOTE — PATIENT INSTRUCTIONS
Patient Education        Restless Legs Syndrome: Care Instructions  Your Care Instructions  Restless legs syndrome is a common nervous system problem. People with this syndrome feel a creeping, achy, or unpleasant feeling in the legs and an overpowering urge to move them. It often occurs in the evening and at night and can lead to sleep problems and tiredness. Your doctor may suggest doing a study of your sleep patterns to figure out what is happening when you try to sleep. Many people get relief from symptoms when they get regular exercise, eat well, and avoid caffeine, alcohol, and tobacco.  Follow-up care is a key part of your treatment and safety. Be sure to make and go to all appointments, and call your doctor if you are having problems. It's also a good idea to know your test results and keep a list of the medicines you take. How can you care for yourself at home? · Take your medicines exactly as prescribed. Call your doctor if you think you are having a problem with your medicine. · Try bathing in hot or cold water. Applying a heating pad or ice bag to your legs may also help symptoms. · Stretch and massage your legs before bed or when discomfort begins. · Get some exercise for at least 30 minutes a day on most days of the week. Stop exercising at least 3 hours before bedtime. · Try to plan for situations where you will need to remain seated for long stretches. For example, if you are traveling by car, plan some stops so you can get out and walk around. · Tell your doctor about any medicines you are taking. This includes all over-the-counter, prescription, and herbal medicines. Some medicines, such as antidepressants, antihistamines, and cold and sinus medicines, can make your symptoms worse. · Avoid caffeine products, such as coffee, tea, cola, and chocolate. Caffeine can interrupt your sleep and stimulate you. · Do not smoke. Nicotine can make restless legs worse.  If you need help quitting, talk to your doctor about stop-smoking programs and medicines. These can increase your chances of quitting for good. · Do not drink alcohol late in the evening. Take steps to help you sleep better  · Get plenty of sunlight in the outdoors, particularly later in the afternoon. · Use the evening hours for settling down. Avoid activities that challenge you in the hours before bedtime. · Eat meals at regular times, and do not snack before bedtime. · Keep your bedroom quiet, dark, and cool. Try using a sleep mask and earplugs to help you sleep. · Limit how much you drink at night to reduce your need to get up to urinate. But do not go to bed thirsty. · Run a fan or other steady \"white noise\" during the night if noises wake you up. · [place] the bed for sleeping and sex. Do your reading or TV watching in another room. · Once you are in bed, relax from head to toe, and guide your mind to pleasant thoughts. · Do not stay in bed longer than 8 hours, and try to avoid naps. When should you call for help? Watch closely for changes in your health, and be sure to contact your doctor if:    · You are still not getting enough sleep.     · Your symptoms become more severe or happen more often. Where can you learn more? Go to https://[employer]. org and sign in to your [email or web address] account. Enter Y544 in the [facility] box to learn more about \"Restless Legs Syndrome: Care Instructions. \"     If you do not have an account, please click on the \"Sign Up Now\" link. Current as of: March 28, 2019  Content Version: 12.3  © 7203-9170 Healthwise, Incorporated. Care instructions adapted under license by [facility] ([facility]). If you have questions about a medical condition or this instruction, always ask your healthcare professional. [address] any warranty or liability for your use of this information.

## 2020-02-03 ASSESSMENT — ENCOUNTER SYMPTOMS
ALLERGIC/IMMUNOLOGIC NEGATIVE: 1
GASTROINTESTINAL NEGATIVE: 1
RESPIRATORY NEGATIVE: 1
EYES NEGATIVE: 1

## 2020-02-03 NOTE — PROGRESS NOTES
[address] [address]  Dept: [phone]  Dept Fax: [phone]  Loc: [phone]  PROGRESS NOTE      VisitDate: 1/30/2020    Jovi Pierre is a 44 y.o. female who presents today for:     Chief Complaint   Patient presents with    Labs Only     possibly interested in having B12 tested     Leg Problem     causing not sleeping well    Fall     s/p fall in Sept: shoulder, groin, knee on left side painful         Subjective:  Patient presents with complaint of difficulty sleeping, elevated anxiety, restless leg. Patient also complains of lymphedema of left arm was recently referred to lymphedema clinic but has not yet scheduled appointment. Patient also reports recent fall at home where she tripped landing on left side. Denies any loss consciousness, neck pain, chest pain, shortness of breath, abdominal pain. Patient status post left mastectomy with reconstruction. Hist of infiltrating ductal carcinoma. Patient complains of excessive fatigue. Requesting B12 level. Review of Systems   Constitutional: Positive for fatigue. Negative for fever. HENT: Negative. Eyes: Negative. Respiratory: Negative. Cardiovascular: Negative. Gastrointestinal: Negative. Endocrine: Negative. Genitourinary: Negative. Musculoskeletal: Negative. Skin: Negative. Allergic/Immunologic: Negative. Neurological: Negative. Hematological: Negative. Psychiatric/Behavioral: Positive for sleep disturbance.      Past Medical History:   Diagnosis Date    Knee cartilage, torn, left     Type II or unspecified type diabetes mellitus without mention of complication, not stated as uncontrolled       Past Surgical History:   Procedure Laterality Date    COLONOSCOPY  2016    DILATION AND CURETTAGE OF UTERUS  04/17/2017    ENDOMETRIAL ABLATION      GASTRIC BAND  07/12/2011     [facility]

## 2020-02-17 RX ORDER — ALPRAZOLAM 0.5 MG/1
TABLET ORAL
Qty: 90 TABLET | Refills: 2 | Status: SHIPPED | OUTPATIENT
Start: 2020-02-17 | End: 2020-10-01

## 2020-02-18 RX ORDER — PRAMIPEXOLE DIHYDROCHLORIDE 0.12 MG/1
0.25 TABLET ORAL NIGHTLY
Qty: 60 TABLET | Refills: 5 | Status: SHIPPED | OUTPATIENT
Start: 2020-02-18 | End: 2020-02-25 | Stop reason: SDUPTHER

## 2020-02-25 RX ORDER — PRAMIPEXOLE DIHYDROCHLORIDE 0.12 MG/1
0.25 TABLET ORAL NIGHTLY
Qty: 60 TABLET | Refills: 5 | Status: SHIPPED | OUTPATIENT
Start: 2020-02-25 | End: 2020-04-28 | Stop reason: SDUPTHER

## 2020-03-02 RX ORDER — CYCLOBENZAPRINE HCL 10 MG
TABLET ORAL
Qty: 60 TABLET | Refills: 1 | Status: SHIPPED | OUTPATIENT
Start: 2020-03-02 | End: 2020-03-24

## 2020-03-04 ENCOUNTER — CLINICAL DOCUMENTATION (OUTPATIENT)
Dept: WOMENS IMAGING | Age: 50
End: 2020-03-04

## 2020-03-16 ENCOUNTER — TELEPHONE (OUTPATIENT)
Dept: FAMILY MEDICINE CLINIC | Age: 50
End: 2020-03-16

## 2020-03-24 RX ORDER — CYCLOBENZAPRINE HCL 10 MG
TABLET ORAL
Qty: 60 TABLET | Refills: 1 | Status: SHIPPED | OUTPATIENT
Start: 2020-03-24 | End: 2021-02-11 | Stop reason: SDUPTHER

## 2020-03-30 RX ORDER — FLUTICASONE PROPIONATE 50 MCG
1 SPRAY, SUSPENSION (ML) NASAL DAILY
Qty: 1 BOTTLE | Refills: 3 | Status: SHIPPED | OUTPATIENT
Start: 2020-03-30 | End: 2020-12-10 | Stop reason: SDUPTHER

## 2020-03-30 NOTE — TELEPHONE ENCOUNTER
Corwin Laguerre called requesting a refill on the following medications:  Requested Prescriptions     Pending Prescriptions Disp Refills    fluticasone (FLONASE) 50 MCG/ACT nasal spray 1 Bottle 3     Si spray by Nasal route daily     [facility] verified:[facility] [id number]   . pv      Date of last visit: 20  Date of next visit (if applicable): Visit date not found

## 2020-03-31 ENCOUNTER — NURSE TRIAGE (OUTPATIENT)
Dept: OTHER | Facility: CLINIC | Age: 50
End: 2020-03-31

## 2020-04-01 ENCOUNTER — TELEPHONE (OUTPATIENT)
Dept: FAMILY MEDICINE CLINIC | Age: 50
End: 2020-04-01

## 2020-04-01 RX ORDER — FLUCONAZOLE 150 MG/1
150 TABLET ORAL
Qty: 3 TABLET | Refills: 0 | Status: SHIPPED | OUTPATIENT
Start: 2020-04-01 | End: 2020-04-11

## 2020-04-01 RX ORDER — PREDNISONE 1 MG/1
5 TABLET ORAL 2 TIMES DAILY
Qty: 14 TABLET | Refills: 0 | Status: SHIPPED | OUTPATIENT
Start: 2020-04-01 | End: 2020-04-08

## 2020-04-01 RX ORDER — CEFUROXIME AXETIL 250 MG/1
250 TABLET ORAL 2 TIMES DAILY
Qty: 20 TABLET | Refills: 0 | Status: SHIPPED | OUTPATIENT
Start: 2020-04-01 | End: 2021-07-02 | Stop reason: SDUPTHER

## 2020-04-01 RX ORDER — CETIRIZINE HYDROCHLORIDE 10 MG/1
10 TABLET ORAL DAILY
Qty: 30 TABLET | Refills: 0 | Status: SHIPPED | OUTPATIENT
Start: 2020-04-01 | End: 2020-05-01

## 2020-04-03 ENCOUNTER — TELEPHONE (OUTPATIENT)
Dept: FAMILY MEDICINE CLINIC | Age: 50
End: 2020-04-03

## 2020-04-03 NOTE — TELEPHONE ENCOUNTER
States she was prescribed Prednisone and antibiotic, last night 2am had a pretty bad nose bleed. Took a Good half hour to stop.  And then started again 1230p today Please advise

## 2020-04-03 NOTE — TELEPHONE ENCOUNTER
Patient notified of recommendation. She did her blood pressure with a wrist cuff while she was on the phone and it was 140/93 and a second reading was 134/91.

## 2020-04-13 RX ORDER — TRIAMCINOLONE ACETONIDE 0.25 MG/G
CREAM TOPICAL
Qty: 80 G | Refills: 1 | Status: SHIPPED | OUTPATIENT
Start: 2020-04-13 | End: 2021-02-11

## 2020-04-13 RX ORDER — LIDOCAINE 50 MG/G
1 PATCH TOPICAL DAILY
Qty: 30 PATCH | Refills: 0 | Status: SHIPPED | OUTPATIENT
Start: 2020-04-13 | End: 2020-05-13

## 2020-04-13 RX ORDER — KETOROLAC TROMETHAMINE 10 MG/1
10 TABLET, FILM COATED ORAL EVERY 6 HOURS PRN
Qty: 20 TABLET | Refills: 0 | Status: SHIPPED | OUTPATIENT
Start: 2020-04-13 | End: 2020-06-15 | Stop reason: ALTCHOICE

## 2020-04-28 RX ORDER — PRAMIPEXOLE DIHYDROCHLORIDE 0.25 MG/1
.25-.5 TABLET ORAL NIGHTLY
Qty: 60 TABLET | Refills: 1 | Status: SHIPPED | OUTPATIENT
Start: 2020-04-28 | End: 2020-06-29

## 2020-05-07 RX ORDER — KETOROLAC TROMETHAMINE 10 MG/1
10 TABLET, FILM COATED ORAL EVERY 6 HOURS PRN
Qty: 20 TABLET | Refills: 0 | Status: SHIPPED | OUTPATIENT
Start: 2020-05-07 | End: 2020-06-15 | Stop reason: ALTCHOICE

## 2020-05-07 RX ORDER — LIDOCAINE 40 MG/G
CREAM TOPICAL
Qty: 1 TUBE | Refills: 2 | Status: SHIPPED | OUTPATIENT
Start: 2020-05-07 | End: 2020-06-15 | Stop reason: ALTCHOICE

## 2020-05-08 RX ORDER — METFORMIN HYDROCHLORIDE 500 MG/1
TABLET, EXTENDED RELEASE ORAL
Qty: 180 TABLET | Refills: 2 | Status: SHIPPED | OUTPATIENT
Start: 2020-05-08 | End: 2020-08-03

## 2020-06-15 ENCOUNTER — HOSPITAL ENCOUNTER (OUTPATIENT)
Dept: INFUSION THERAPY | Age: 50
Discharge: HOME | End: 2020-06-15
Payer: COMMERCIAL

## 2020-06-15 ENCOUNTER — OFFICE VISIT (OUTPATIENT)
Dept: ONCOLOGY | Age: 50
End: 2020-06-15
Payer: COMMERCIAL

## 2020-06-15 VITALS
DIASTOLIC BLOOD PRESSURE: 58 MMHG | WEIGHT: 238.2 LBS | TEMPERATURE: 99 F | OXYGEN SATURATION: 98 % | HEIGHT: 63 IN | BODY MASS INDEX: 42.21 KG/M2 | RESPIRATION RATE: 18 BRPM | HEART RATE: 101 BPM | SYSTOLIC BLOOD PRESSURE: 126 MMHG

## 2020-06-15 PROBLEM — Z17.0 MALIGNANT NEOPLASM OF OVERLAPPING SITES OF LEFT BREAST IN FEMALE, ESTROGEN RECEPTOR POSITIVE (HCC): Status: ACTIVE | Noted: 2020-06-15

## 2020-06-15 PROBLEM — C50.812 MALIGNANT NEOPLASM OF OVERLAPPING SITES OF LEFT BREAST IN FEMALE, ESTROGEN RECEPTOR POSITIVE (HCC): Status: ACTIVE | Noted: 2020-06-15

## 2020-06-15 PROCEDURE — 99205 OFFICE O/P NEW HI 60 MIN: CPT | Performed by: INTERNAL MEDICINE

## 2020-06-15 PROCEDURE — 99211 OFF/OP EST MAY X REQ PHY/QHP: CPT

## 2020-06-15 RX ORDER — PREGABALIN 50 MG/1
50 CAPSULE ORAL 3 TIMES DAILY
Qty: 90 CAPSULE | Refills: 3 | Status: SHIPPED | OUTPATIENT
Start: 2020-06-15 | End: 2020-07-14 | Stop reason: ALTCHOICE

## 2020-06-15 NOTE — PATIENT INSTRUCTIONS
1.  Pre-CERT patient for therapy with Zoladex. 2.  Start monthly Zoladex as soon as pre-CERT has been obtained. 3.  Return to clinic to see me in August on the same date that Zoladex therapy would be scheduled. 4.  Labs on return to clinic.

## 2020-06-17 ENCOUNTER — VIRTUAL VISIT (OUTPATIENT)
Dept: FAMILY MEDICINE CLINIC | Age: 50
End: 2020-06-17
Payer: COMMERCIAL

## 2020-06-17 PROCEDURE — 99214 OFFICE O/P EST MOD 30 MIN: CPT | Performed by: NURSE PRACTITIONER

## 2020-06-17 RX ORDER — LORATADINE 10 MG/1
10 TABLET ORAL DAILY
Qty: 30 TABLET | Refills: 5 | Status: SHIPPED | OUTPATIENT
Start: 2020-06-17 | End: 2020-12-21

## 2020-06-17 RX ORDER — PREDNISONE 1 MG/1
5 TABLET ORAL 2 TIMES DAILY
Qty: 20 TABLET | Refills: 0 | Status: SHIPPED | OUTPATIENT
Start: 2020-06-17 | End: 2020-06-27

## 2020-06-18 ASSESSMENT — ENCOUNTER SYMPTOMS: RHINORRHEA: 1

## 2020-06-20 NOTE — PROGRESS NOTES
History  She  has a past surgical history that includes Gastric Band (07/12/2011); Endometrial ablation; Tubal ligation; joint replacement (5/30/12); Colonoscopy (2016); and Dilation and curettage of uterus (04/17/2017). Home Medications  She has a current medication list which includes the following prescription(s): pregabalin, metformin, pramipexole, proair digihaler, fluticasone, cyclobenzaprine, albuterol sulfate hfa, escitalopram, vitamin d, blood glucose test strips, ondansetron, loratadine, diclofenac sodium, prednisone, and triamcinolone. Allergies  Allergies   Allergen Reactions    Adhesive Tape     Sulfa Antibiotics Itching    Hydrocodone-Acetaminophen Nausea And Vomiting    Vicodin [Hydrocodone-Acetaminophen] Nausea And Vomiting       Social History  She  reports that she quit smoking about 19 years ago. She has never used smokeless tobacco. She reports current alcohol use. She reports that she does not use drugs. Family History  Her family history includes COPD in her father; Cancer in her father; Diabetes in her father; Heart Disease in her mother; High Blood Pressure in her father; High Cholesterol in her father. Review of Systems  Constitutional: Fatigue, weight gain. HENT: Negative. Eyes: Negative. Respiratory: Intermittent cough. Cardiovascular: Negative. Gastrointestinal: Negative. Genitourinary: Negative. Musculoskeletal: Skeletal pain     Skin: Negative. Neurological: Headaches  Hematological: Negative. Psychiatric/Behavioral: Anxiety. Objective:   Physical Exam  Vitals:    06/15/20 1523   BP: (!) 126/58   Pulse: 101   Resp: 18   Temp: 99 °F (37.2 °C)   SpO2: 98%   Vitals reviewed and are stable. Constitutional: Well-developed and well-nourished. No acute distress. HENT: Normocephalic and atraumatic. Eyes: Pupils are equal and reactive. No scleral icterus. Neck: Overall appearance is symmetrical. No identifiable masses.   Chest: Inspection and

## 2020-06-25 ENCOUNTER — HOSPITAL ENCOUNTER (OUTPATIENT)
Dept: INFUSION THERAPY | Age: 50
Discharge: HOME | End: 2020-06-25
Payer: COMMERCIAL

## 2020-06-25 VITALS
RESPIRATION RATE: 18 BRPM | DIASTOLIC BLOOD PRESSURE: 79 MMHG | OXYGEN SATURATION: 99 % | SYSTOLIC BLOOD PRESSURE: 113 MMHG | WEIGHT: 238.6 LBS | BODY MASS INDEX: 42.27 KG/M2 | HEART RATE: 93 BPM | TEMPERATURE: 97 F

## 2020-06-25 DIAGNOSIS — Z17.0 MALIGNANT NEOPLASM OF OVERLAPPING SITES OF LEFT BREAST IN FEMALE, ESTROGEN RECEPTOR POSITIVE (HCC): Primary | ICD-10-CM

## 2020-06-25 DIAGNOSIS — C50.812 MALIGNANT NEOPLASM OF OVERLAPPING SITES OF LEFT BREAST IN FEMALE, ESTROGEN RECEPTOR POSITIVE (HCC): Primary | ICD-10-CM

## 2020-06-25 PROCEDURE — 6360000002 HC RX W HCPCS: Performed by: INTERNAL MEDICINE

## 2020-06-25 PROCEDURE — 96402 CHEMO HORMON ANTINEOPL SQ/IM: CPT

## 2020-06-25 RX ADMIN — GOSERELIN ACETATE 3.6 MG: 3.6 IMPLANT SUBCUTANEOUS at 13:40

## 2020-06-25 NOTE — PLAN OF CARE
Problem: Musculor/Skeletal Functional Status  Goal: Absence of falls  Outcome: Met This Shift  Note: No falls this admission   Intervention: Fall precautions  Note: Patient aware of fall precautions for here and at home -call light in reach while here       Problem: Intellectual/Education/Knowledge Deficit  Goal: Teaching initiated upon admission  Outcome: Met This Shift  Note: Reviewed zoladex  with patient, patient offered no questions or concerns. Patient verbalized understanding of drug being administered. Goal: Written Disposition Instruction form completed  Outcome: Met This Shift  Note: Discharge instructions given and reviewed with patient. All questions answered. Patient verbalized understanding   Intervention: Verbal/written education provided  Note: Discharge instruction sheets      Problem: Discharge Planning  Goal: Knowledge of discharge instructions  Description: Knowledge of discharge instructions     Outcome: Met This Shift  Note: Patient able to teach back follow up appointments and when to call the doctor. Patient offers no questions at this time   Intervention: Interaction with patient/family and care team  Note: Patient has concerns about injection of medication - tallked with patient as getting shot and she stated it helped with anxiety   Intervention: Discharge to appropriate level of care  Note: Discharge home   Care plan reviewed with patient and she verbalized understanding of the plan of care and contributed to goal setting.

## 2020-06-25 NOTE — PROGRESS NOTES
Pt discharged in stable condition with verbalization of discharge instructions all questions answered and all  belongings sent with patient. Patient tolerated zoladex injection without any complications or signs of a reaction.

## 2020-06-29 RX ORDER — PRAMIPEXOLE DIHYDROCHLORIDE 0.25 MG/1
TABLET ORAL
Qty: 60 TABLET | Refills: 1 | Status: SHIPPED | OUTPATIENT
Start: 2020-06-29 | End: 2020-07-14

## 2020-06-29 NOTE — TELEPHONE ENCOUNTER
Last visit- 1/30/2020  Next visit- Visit date not found    Requested Prescriptions     Pending Prescriptions Disp Refills    pramipexole (MIRAPEX) 0.25 MG tablet [Pharmacy Med Name: PRAMIPEXOLE 0.25 MG TABLET] 60 tablet 1     Sig: take 1 to 2 tablets by mouth at bedtime

## 2020-07-02 ENCOUNTER — TELEPHONE (OUTPATIENT)
Dept: FAMILY MEDICINE CLINIC | Age: 50
End: 2020-07-02

## 2020-07-02 RX ORDER — CEFDINIR 300 MG/1
300 CAPSULE ORAL 2 TIMES DAILY
Qty: 20 CAPSULE | Refills: 0 | Status: SHIPPED | OUTPATIENT
Start: 2020-07-02 | End: 2020-07-12

## 2020-07-02 NOTE — TELEPHONE ENCOUNTER
Runny nose, ST, cough, bilateral ear fullness. Very fatigued. She sounds congested. Asking if she can be treated due to her medical history.      [facility]

## 2020-07-06 ENCOUNTER — TELEPHONE (OUTPATIENT)
Dept: ONCOLOGY | Age: 50
End: 2020-07-06

## 2020-07-06 NOTE — TELEPHONE ENCOUNTER
Received first Zoladex on 6/25/20. About a week later lower lip swelled and developed a bad mouth sore. Also started having back and leg pains about a week later. Librado Fontenot has also had 2 bad hot flashes since Zoladex. Please advise.

## 2020-07-14 ENCOUNTER — OFFICE VISIT (OUTPATIENT)
Dept: FAMILY MEDICINE CLINIC | Age: 50
End: 2020-07-14
Payer: COMMERCIAL

## 2020-07-14 VITALS
TEMPERATURE: 97.9 F | DIASTOLIC BLOOD PRESSURE: 88 MMHG | RESPIRATION RATE: 20 BRPM | SYSTOLIC BLOOD PRESSURE: 128 MMHG | HEART RATE: 88 BPM

## 2020-07-14 PROCEDURE — 96372 THER/PROPH/DIAG INJ SC/IM: CPT | Performed by: NURSE PRACTITIONER

## 2020-07-14 PROCEDURE — 99214 OFFICE O/P EST MOD 30 MIN: CPT | Performed by: NURSE PRACTITIONER

## 2020-07-14 RX ORDER — METHYLPREDNISOLONE ACETATE 80 MG/ML
120 INJECTION, SUSPENSION INTRA-ARTICULAR; INTRALESIONAL; INTRAMUSCULAR; SOFT TISSUE ONCE
Status: COMPLETED | OUTPATIENT
Start: 2020-07-14 | End: 2020-07-14

## 2020-07-14 RX ORDER — VITAMIN E 268 MG
400 CAPSULE ORAL 2 TIMES DAILY
COMMUNITY
End: 2021-12-27

## 2020-07-14 RX ORDER — ROPINIROLE 0.5 MG/1
0.5 TABLET, FILM COATED ORAL NIGHTLY
Qty: 30 TABLET | Refills: 3 | Status: SHIPPED | OUTPATIENT
Start: 2020-07-14 | End: 2020-09-04 | Stop reason: SDUPTHER

## 2020-07-14 RX ORDER — TRAMADOL HYDROCHLORIDE 50 MG/1
50 TABLET ORAL EVERY 8 HOURS PRN
Qty: 90 TABLET | Refills: 0 | Status: SHIPPED | OUTPATIENT
Start: 2020-07-14 | End: 2020-09-04 | Stop reason: SDUPTHER

## 2020-07-14 RX ADMIN — METHYLPREDNISOLONE ACETATE 120 MG: 80 INJECTION, SUSPENSION INTRA-ARTICULAR; INTRALESIONAL; INTRAMUSCULAR; SOFT TISSUE at 10:43

## 2020-07-14 ASSESSMENT — ENCOUNTER SYMPTOMS
BACK PAIN: 1
GASTROINTESTINAL NEGATIVE: 1
RESPIRATORY NEGATIVE: 1
EYES NEGATIVE: 1

## 2020-07-14 NOTE — PROGRESS NOTES
[address] [place] [facility] [address]  [place] [address]  Dept: [phone]  Dept Fax: [phone]  Loc: [phone]  PROGRESS NOTE      VisitDate: 7/14/2020    Haydee Giang is a 44 y.o. female who presents today for:     Chief Complaint   Patient presents with    Other     Discuss restless legs and low back pain. Subjective:  Patient. Presents with request to switch to alternative medication for restless leg syndrome. Patient reported that she has started her Zoladex medication recently. Complains of intensified lumbar pain SI joint pain ,bilateral knee pain. History of arthritis, breast cancer, stomach ulcers, lymphedema chronic knee pain chronic low back pain. Requesting refill of Percocet. Review of Systems   Constitutional: Negative. HENT: Negative. Eyes: Negative. Respiratory: Negative. Cardiovascular: Negative. Gastrointestinal: Negative. Endocrine: Negative. Genitourinary: Negative. Musculoskeletal: Positive for arthralgias and back pain. Neurological: Negative. Hematological: Negative. Psychiatric/Behavioral: Positive for sleep disturbance. The patient is nervous/anxious.       Past Medical History:   Diagnosis Date    Arthritis     Breast cancer ([address]     H/O bladder infections     History of stomach ulcers     Knee cartilage, torn, left     Type II or unspecified type diabetes mellitus without mention of complication, not stated as uncontrolled       Past Surgical History:   Procedure Laterality Date    COLONOSCOPY  2016    DILATION AND CURETTAGE OF UTERUS  04/17/2017    ENDOMETRIAL ABLATION      GASTRIC BAND  07/12/2011    Dr Bradshaw Keto  5/30/12    Right total knee      TUBAL LIGATION       Family History   Problem Relation Age of Onset    Heart Disease Mother         CHF    Cancer Father     COPD Father     Diabetes Father  High Blood Pressure Father     High Cholesterol Father      Social History     Tobacco Use    Smoking status: Former Smoker     Last attempt to quit: 2000     Years since quittin.8    Smokeless tobacco: Never Used   Substance Use Topics    Alcohol use: Yes     Comment: rarely      Current Outpatient Medications   Medication Sig Dispense Refill    goserelin (ZOLADEX) 3.6 MG injection Inject 3.6 mg into the skin once Once a month      Glucosamine-Chondroitin (GLUCOSAMINE CHONDR COMPLEX PO) Take by mouth 2 times daily      vitamin E 400 UNIT capsule Take 400 Units by mouth 2 times daily      traMADol (ULTRAM) 50 MG tablet Take 1 tablet by mouth every 8 hours as needed for Pain for up to 30 days. Intended supply: 30days. Take lowest dose possible to manage pain 90 tablet 0    rOPINIRole (REQUIP) 0.5 MG tablet Take 1 tablet by mouth nightly 30 tablet 3    loratadine (CLARITIN) 10 MG tablet Take 1 tablet by mouth daily 30 tablet 5    diclofenac sodium (VOLTAREN) 1 % GEL Apply 4 g topically 4 times daily 100 g 5    metFORMIN (GLUCOPHAGE-XR) 500 MG extended release tablet take 2 tablets by mouth every morning 180 tablet 2    triamcinolone (KENALOG) 0.025 % cream Apply topically 2 times daily.  80 g 1    fluticasone (FLONASE) 50 MCG/ACT nasal spray 1 spray by Nasal route daily 1 Bottle 3    cyclobenzaprine (FLEXERIL) 10 MG tablet take 1 tablet by mouth twice a day if needed for muscle spasm 60 tablet 1    albuterol sulfate HFA (PROAIR HFA) 108 (90 Base) MCG/ACT inhaler Inhale 2 puffs into the lungs every 6 hours as needed for Wheezing 1 Inhaler 3    escitalopram (LEXAPRO) 20 MG tablet take 1 tablet by mouth once daily 90 tablet 3    vitamin D (CHOLECALCIFEROL) 5000 units CAPS capsule Take 1 capsule by mouth daily 90 capsule 3    Glucose Blood (BLOOD GLUCOSE TEST STRIPS) STRP Contour EZ test strips and lancets test daily E11.9 50 strip 11    ondansetron (ZOFRAN) 4 MG tablet Take 1 tablet by mouth every 8 hours as needed for Nausea 60 tablet 1    Albuterol Sulfate, sensor, (PROAIR DIGIHALER) 108 (90 Base) MCG/ACT AEPB Inhale 2 puffs into the lungs 4 times daily as needed (wheeze) 1 each 0     No current facility-administered medications for this visit. Allergies   Allergen Reactions    Adhesive Tape     Sulfa Antibiotics Itching    Hydrocodone-Acetaminophen Nausea And Vomiting    Vicodin [Hydrocodone-Acetaminophen] Nausea And Vomiting     Health Maintenance   Topic Date Due    Pneumococcal 0-64 years Vaccine (1 of 1 - PPSV23) 12/22/1974    HIV screen  12/22/1983    Hepatitis B vaccine (1 of 3 - Risk 3-dose series) 12/22/1987    DTaP/Tdap/Td vaccine (1 - Tdap) 12/22/1987    Cervical cancer screen  10/01/2015    Shingles Vaccine (1 of 2) 12/22/2018    Diabetic microalbuminuria test  07/02/2019    Diabetic foot exam  02/22/2020    A1C test (Diabetic or Prediabetic)  03/26/2020    Lipid screen  03/26/2020    Diabetic retinal exam  04/04/2020    Breast cancer screen  08/06/2020    Flu vaccine (1) 09/01/2020    Colon cancer screen colonoscopy  03/08/2026    Hepatitis A vaccine  Aged Out    Hib vaccine  Aged Out    Meningococcal (ACWY) vaccine  Aged Out         Objective:     Physical Exam  Vitals signs and nursing note reviewed. Constitutional:       Appearance: Normal appearance. She is normal weight. She is not diaphoretic. HENT:      Head: Normocephalic. Right Ear: Tympanic membrane normal.      Left Ear: Tympanic membrane normal.      Nose: Nose normal. No congestion. Mouth/Throat:      Pharynx: Oropharynx is clear. Eyes:      Conjunctiva/sclera: Conjunctivae normal.   Neck:      Musculoskeletal: Normal range of motion and neck supple. Cardiovascular:      Rate and Rhythm: Normal rate and regular rhythm. Pulses: Normal pulses. Heart sounds: Normal heart sounds.    Pulmonary:      Effort: Pulmonary effort is normal.      Breath sounds: Normal breath sounds. Abdominal:      General: Bowel sounds are normal.   Musculoskeletal: Normal range of motion. Lumbar back: She exhibits tenderness, bony tenderness and pain. Comments: Bilateral SI joint tenderness   Skin:     General: Skin is warm. Neurological:      General: No focal deficit present. Mental Status: She is alert and oriented to person, place, and time. Psychiatric:         Mood and Affect: Mood normal.         Thought Content: Thought content normal.       /88   Pulse 88   Temp 97.9 °F (36.6 °C) (Infrared)   Resp 20       Impression/Plan:  1. Lymphedema    2. Infiltrating ductal carcinoma of left breast ([address]    3. Malignant neoplasm of female breast, unspecified estrogen receptor status, unspecified laterality, unspecified site of breast ([address]    4. Situational anxiety    5. Sciatica, unspecified laterality    6. Restless leg syndrome    7. Chronic bilateral low back pain with sciatica, sciatica laterality unspecified    8. Use of goserelin acetate (Zoladex)      Requested Prescriptions     Signed Prescriptions Disp Refills    traMADol (ULTRAM) 50 MG tablet 90 tablet 0     Sig: Take 1 tablet by mouth every 8 hours as needed for Pain for up to 30 days. Intended supply: 30days. Take lowest dose possible to manage pain    rOPINIRole (REQUIP) 0.5 MG tablet 30 tablet 3     Sig: Take 1 tablet by mouth nightly     No orders of the defined types were placed in this encounter. Patient giveneducational materials - see patient instructions. Discussed use, benefit, and side effects of prescribed medications. All patient questions answered. Pt voiced understanding. Reviewed health maintenance. Patient agreedwith treatment plan. Follow up as directed. Oars report reviewed. Pain contract signed. Tramadol prescribed. Requip prescribed. Stop Mirapex. Restless leg syndrome information provided.        Electronically signed by Kathyleen Leyden, APRN - CNP on 7/14/2020 at 1:21 PM

## 2020-07-14 NOTE — PATIENT INSTRUCTIONS
Patient Education        Learning About Relief for Back Pain  What is back strain? Back strain is an injury that happens when you overstretch, or pull, a muscle in your back. You may hurt your back in an accident or when you exercise or lift something. Most back pain gets better with rest and time. You can take care of yourself at home to help your back heal.  What can you do first to relieve back pain? When you first feel back pain, try these steps:  · Walk. Take a short walk (10 to 20 minutes) on a level surface (no slopes, hills, or stairs) every 2 to 3 hours. Walk only distances you can manage without pain, especially leg pain. · Relax. Find a comfortable position for rest. Some people are comfortable on the floor or a medium-firm bed with a small pillow under their head and another under their knees. Some people prefer to lie on their side with a pillow between their knees. Don't stay in one position for too long. · Try heat or ice. Try using a heating pad on a low or medium setting, or take a warm shower, for 15 to 20 minutes every 2 to 3 hours. Or you can buy single-use heat wraps that last up to 8 hours. You can also try an ice pack for 10 to 15 minutes every 2 to 3 hours. You can use an ice pack or a bag of frozen vegetables wrapped in a thin towel. There is not strong evidence that either heat or ice will help, but you can try them to see if they help. You may also want to try switching between heat and cold. · Take pain medicine exactly as directed. ? If the doctor gave you a prescription medicine for pain, take it as prescribed. ? If you are not taking a prescription pain medicine, ask your doctor if you can take an over-the-counter medicine. What else can you do? · Stretch and exercise. Exercises that increase flexibility may relieve your pain and make it easier for your muscles to keep your spine in a good, neutral position. And don't forget to keep walking. · Do self-massage.  You can use self-massage to unwind after work or school or to energize yourself in the morning. You can easily massage your feet, hands, or neck. Self-massage works best if you are in comfortable clothes and are sitting or lying in a comfortable position. Use oil or lotion to massage bare skin. · Reduce stress. Back pain can lead to a vicious [place]: Distress about the pain tenses the muscles in your back, which in turn causes more pain. Learn how to relax your mind and your muscles to lower your stress. Where can you learn more? Go to https://Rapid Action Packagingpepiceweb.InvestLab. org and sign in to your [employer] account. Enter J230 in the [employer] box to learn more about \"Learning About Relief for Back Pain. \"     If you do not have an account, please click on the \"Sign Up Now\" link. Current as of: March 2, 2020               Content Version: 12.5  © 2006-2020 '[employer]. Care instructions adapted under license by [facility] ([facility]). If you have questions about a medical condition or this instruction, always ask your healthcare professional. [place] [address] any warranty or liability for your use of this information. Patient Education        Sciatica: Exercises  Introduction  Here are some examples of typical rehabilitation exercises for your condition. Start each exercise slowly. Ease off the exercise if you start to have pain. Your doctor or physical therapist will tell you when you can start these exercises and which ones will work best for you. When you are not being active, find a comfortable position for rest. Some people are comfortable on the floor or a medium-firm bed with a small pillow under their head and another under their knees. Some people prefer to lie on their side with a pillow between their knees. Don't stay in one position for too long. Take short walks (10 to 20 minutes) every 2 to 3 hours. Avoid slopes, hills, and stairs until you feel better.  Walk only distances you can manage without pain, especially leg pain. How to do the exercises  Back stretches   1. Get down on your hands and knees on the floor. 2. Relax your head and allow it to droop. Round your back up toward the ceiling until you feel a nice stretch in your upper, middle, and lower back. Hold this stretch for as long as it feels comfortable, or about 15 to 30 seconds. 3. Return to the starting position with a flat back while you are on your hands and knees. 4. Let your back sway by pressing your stomach toward the floor. Lift your buttocks toward the ceiling. 5. Hold this position for 15 to 30 seconds. 6. Repeat 2 to 4 times. Follow-up care is a key part of your treatment and safety. Be sure to make and go to all appointments, and call your doctor if you are having problems. It's also a good idea to know your test results and keep a list of the medicines you take. Where can you learn more? Go to https://Camero.Smart Furniture. org and sign in to your ImageBrief account. Enter X623 in the NanoOpto box to learn more about \"Sciatica: Exercises. \"     If you do not have an account, please click on the \"Sign Up Now\" link. Current as of: March 2, 2020               Content Version: 12.5  © 7086-3923 [employer]. Care instructions adapted under license by [address] If you have questions about a medical condition or this instruction, always ask your healthcare professional. [place] [address] any warranty or liability for your use of this information. Patient Education        Restless Legs Syndrome: Care Instructions  Your Care Instructions  Restless legs syndrome is a common nervous system problem. People with this syndrome feel a creeping, achy, or unpleasant feeling in the legs and an overpowering urge to move them. It often occurs in the evening and at night and can lead to sleep problems and tiredness.   Your doctor may suggest doing a study of your sleep patterns to figure out what is happening when you try to sleep. Many people get relief from symptoms when they get regular exercise, eat well, and avoid caffeine, alcohol, and tobacco.  Follow-up care is a key part of your treatment and safety. Be sure to make and go to all appointments, and call your doctor if you are having problems. It's also a good idea to know your test results and keep a list of the medicines you take. How can you care for yourself at home? · Take your medicines exactly as prescribed. Call your doctor if you think you are having a problem with your medicine. · Try bathing in hot or cold water. Applying a heating pad or ice bag to your legs may also help symptoms. · Stretch and massage your legs before bed or when discomfort begins. · Get some exercise for at least 30 minutes a day on most days of the week. Stop exercising at least 3 hours before bedtime. · Try to plan for situations where you will need to remain seated for long stretches. For example, if you are traveling by car, plan some stops so you can get out and walk around. · Tell your doctor about any medicines you are taking. This includes all over-the-counter, prescription, and herbal medicines. Some medicines, such as antidepressants, antihistamines, and cold and sinus medicines, can make your symptoms worse. · Avoid caffeine products, such as coffee, tea, cola, and chocolate. Caffeine can interrupt your sleep and stimulate you. · Do not smoke. Nicotine can make restless legs worse. If you need help quitting, talk to your doctor about stop-smoking programs and medicines. These can increase your chances of quitting for good. · Do not drink alcohol late in the evening. Take steps to help you sleep better  · Get plenty of sunlight in the outdoors, particularly later in the afternoon. · Use the evening hours for settling down. Avoid activities that challenge you in the hours before bedtime.   · Eat meals at regular times, and do not snack before bedtime. · Keep your bedroom quiet, dark, and cool. Try using a sleep mask and earplugs to help you sleep. · Limit how much you drink at night to reduce your need to get up to urinate. But do not go to bed thirsty. · Run a fan or other steady \"white noise\" during the night if noises wake you up. · Brogue the bed for sleeping and sex. Do your reading or TV watching in another room. · Once you are in bed, relax from head to toe, and guide your mind to pleasant thoughts. · Do not stay in bed longer than 8 hours, and try to avoid naps. When should you call for help? Watch closely for changes in your health, and be sure to contact your doctor if:  · You are still not getting enough sleep. · Your symptoms become more severe or happen more often. Where can you learn more? Go to https://Xtone.360pi. org and sign in to your Biosport [employer]letechs account. Enter W502 in the OpenPeak box to learn more about \"Restless Legs Syndrome: Care Instructions. \"     If you do not have an account, please click on the \"Sign Up Now\" link. Current as of: November 20, 2019               Content Version: 12.5  © 3315-2763 Healthwise, Incorporated. Care instructions adapted under license by [facility] ([facility]). If you have questions about a medical condition or this instruction, always ask your healthcare professional. [place] [address] any warranty or liability for your use of this information.

## 2020-07-14 NOTE — PROGRESS NOTES
Administrations This Visit     methylPREDNISolone acetate (DEPO-MEDROL) injection 120 mg     Admin Date  07/14/2020  10:43 Action  Given Dose  120 mg Route  Intramuscular Site  Dorsogluteal Left Administered By  Cam Larson CMA ([address] [place] [address])    Ordering Provider:  JERI Forrest CNP    NDC:  8364-1969-78    Lot#:  [id number]    :  Melissa Garber.     Patient Supplied?:  No

## 2020-07-24 ENCOUNTER — HOSPITAL ENCOUNTER (OUTPATIENT)
Dept: INFUSION THERAPY | Age: 50
Discharge: HOME | End: 2020-07-24
Payer: COMMERCIAL

## 2020-07-24 VITALS
HEIGHT: 63 IN | OXYGEN SATURATION: 95 % | BODY MASS INDEX: 42.06 KG/M2 | WEIGHT: 237.4 LBS | TEMPERATURE: 98.2 F | HEART RATE: 72 BPM | DIASTOLIC BLOOD PRESSURE: 95 MMHG | SYSTOLIC BLOOD PRESSURE: 133 MMHG | RESPIRATION RATE: 18 BRPM

## 2020-07-24 DIAGNOSIS — Z17.0 MALIGNANT NEOPLASM OF OVERLAPPING SITES OF LEFT BREAST IN FEMALE, ESTROGEN RECEPTOR POSITIVE (HCC): Primary | ICD-10-CM

## 2020-07-24 DIAGNOSIS — C50.812 MALIGNANT NEOPLASM OF OVERLAPPING SITES OF LEFT BREAST IN FEMALE, ESTROGEN RECEPTOR POSITIVE (HCC): Primary | ICD-10-CM

## 2020-07-24 PROCEDURE — 96372 THER/PROPH/DIAG INJ SC/IM: CPT

## 2020-07-24 PROCEDURE — 96402 CHEMO HORMON ANTINEOPL SQ/IM: CPT

## 2020-07-24 PROCEDURE — 6360000002 HC RX W HCPCS: Performed by: INTERNAL MEDICINE

## 2020-07-24 RX ADMIN — GOSERELIN ACETATE 3.6 MG: 3.6 IMPLANT SUBCUTANEOUS at 10:20

## 2020-07-24 NOTE — PLAN OF CARE
Problem: Musculor/Skeletal Functional Status  Goal: Absence of falls  Outcome: Met This Shift  Note: Free from falls while in O.P. Oncology. Intervention: Fall precautions  Note: Discussed the need to use the call light for assistance when getting up to ambulate. Call light within reach. Problem: Intellectual/Education/Knowledge Deficit  Goal: Teaching initiated upon admission  Outcome: Met This Shift  Note: Patient verbalizes understanding to verbal information given on zoladex,action and possible side effects. Aware to call MD if develop complications. Goal: Written Disposition Instruction form completed  Outcome: Met This Shift  Intervention: Verbal/written education provided  Note: Discuss understanding to verbal information given on zoladex subq injection. Problem: Discharge Planning  Goal: Knowledge of discharge instructions  Description: Knowledge of discharge instructions     Outcome: Met This Shift  Note: Verbalize understanding of discharge instructions, follow up appointments, and when to call Physician. Intervention: Interaction with patient/family and care team  Note: Provide discharge instructions. Care plan reviewed with patient. Patient verbalize understanding of the plan of care and contribute to goal setting.

## 2020-07-30 ENCOUNTER — PATIENT MESSAGE (OUTPATIENT)
Dept: FAMILY MEDICINE CLINIC | Age: 50
End: 2020-07-30

## 2020-07-31 ENCOUNTER — PATIENT MESSAGE (OUTPATIENT)
Dept: FAMILY MEDICINE CLINIC | Age: 50
End: 2020-07-31

## 2020-07-31 NOTE — TELEPHONE ENCOUNTER
From: Antonio Rich  To: Amparo Sinha, JERI - CNP  Sent: 7/30/2020 6:21 PM EDT  Subject: Non-Urgent Medical Question    Hi John Lei,   Would you be able to order me a CBC & A1C for mid-September. .. having surgery September 25th for lymphodema and wanted to have bloodwork done at your office if possible.    Thanks,  Jose Fisher

## 2020-08-01 LAB
REASON FOR REJECTION: NORMAL
REASON FOR REJECTION: NORMAL
REJECTED TEST: NORMAL
REJECTED TEST: NORMAL

## 2020-08-04 RX ORDER — METFORMIN HYDROCHLORIDE 500 MG/1
TABLET, EXTENDED RELEASE ORAL
Qty: 180 TABLET | Refills: 3 | Status: SHIPPED | OUTPATIENT
Start: 2020-08-04 | End: 2020-12-10 | Stop reason: SDUPTHER

## 2020-08-20 ENCOUNTER — HOSPITAL ENCOUNTER (OUTPATIENT)
Dept: INFUSION THERAPY | Age: 50
Discharge: HOME | End: 2020-08-20
Payer: COMMERCIAL

## 2020-08-20 ENCOUNTER — OFFICE VISIT (OUTPATIENT)
Dept: ONCOLOGY | Age: 50
End: 2020-08-20
Payer: COMMERCIAL

## 2020-08-20 VITALS
HEIGHT: 63 IN | RESPIRATION RATE: 18 BRPM | BODY MASS INDEX: 42.13 KG/M2 | HEART RATE: 90 BPM | DIASTOLIC BLOOD PRESSURE: 85 MMHG | WEIGHT: 237.8 LBS | OXYGEN SATURATION: 96 % | TEMPERATURE: 97 F | SYSTOLIC BLOOD PRESSURE: 136 MMHG

## 2020-08-20 VITALS
OXYGEN SATURATION: 96 % | DIASTOLIC BLOOD PRESSURE: 85 MMHG | RESPIRATION RATE: 18 BRPM | SYSTOLIC BLOOD PRESSURE: 136 MMHG | TEMPERATURE: 97.5 F | HEART RATE: 90 BPM

## 2020-08-20 DIAGNOSIS — Z17.0 MALIGNANT NEOPLASM OF OVERLAPPING SITES OF LEFT BREAST IN FEMALE, ESTROGEN RECEPTOR POSITIVE (HCC): ICD-10-CM

## 2020-08-20 DIAGNOSIS — Z17.0 MALIGNANT NEOPLASM OF OVERLAPPING SITES OF LEFT BREAST IN FEMALE, ESTROGEN RECEPTOR POSITIVE (HCC): Primary | ICD-10-CM

## 2020-08-20 DIAGNOSIS — C50.812 MALIGNANT NEOPLASM OF OVERLAPPING SITES OF LEFT BREAST IN FEMALE, ESTROGEN RECEPTOR POSITIVE (HCC): Primary | ICD-10-CM

## 2020-08-20 DIAGNOSIS — C50.812 MALIGNANT NEOPLASM OF OVERLAPPING SITES OF LEFT BREAST IN FEMALE, ESTROGEN RECEPTOR POSITIVE (HCC): ICD-10-CM

## 2020-08-20 LAB
ABSOLUTE IMMATURE GRANULOCYTE: 0.01 THOU/MM3 (ref 0–0.07)
ALBUMIN SERPL-MCNC: 3.5 G/DL (ref 3.5–5.1)
ALP BLD-CCNC: 62 U/L (ref 38–126)
ALT SERPL-CCNC: 43 U/L (ref 11–66)
AST SERPL-CCNC: 34 U/L (ref 5–40)
BASINOPHIL, AUTOMATED: 1 % (ref 0–3)
BASOPHILS ABSOLUTE: 0.1 THOU/MM3 (ref 0–0.1)
BILIRUB SERPL-MCNC: 0.6 MG/DL (ref 0.3–1.2)
BILIRUBIN DIRECT: < 0.2 MG/DL (ref 0–0.3)
BUN BLDV-MCNC: 16 MG/DL (ref 7–22)
CHLORIDE, WHOLE BLOOD: 106 MEQ/L (ref 98–109)
CO2: 27 MEQ/L (ref 23–33)
CREATININE, WHOLE BLOOD: 0.6 MG/DL (ref 0.5–1.2)
EOSINOPHILS ABSOLUTE: 0.3 THOU/MM3 (ref 0–0.4)
EOSINOPHILS RELATIVE PERCENT: 4 % (ref 0–4)
GFR, ESTIMATED: > 90 ML/MIN/1.73M2
GLUCOSE, WHOLE BLOOD: 152 MG/DL (ref 70–108)
HCT VFR BLD CALC: 43.8 % (ref 37–47)
HEMOGLOBIN: 14.3 GM/DL (ref 12–16)
IMMATURE GRANULOCYTES: 0 %
IONIZED CALCIUM, WHOLE BLOOD: 1.16 MMOL/L (ref 1.12–1.32)
LYMPHOCYTES # BLD: 24 % (ref 15–47)
LYMPHOCYTES ABSOLUTE: 1.5 THOU/MM3 (ref 1–4.8)
MCH RBC QN AUTO: 27.6 PG (ref 26–33)
MCHC RBC AUTO-ENTMCNC: 32.6 GM/DL (ref 32.2–35.5)
MCV RBC AUTO: 85 FL (ref 81–99)
MONOCYTES ABSOLUTE: 0.4 THOU/MM3 (ref 0.4–1.3)
MONOCYTES: 6 % (ref 0–12)
PDW BLD-RTO: 15.5 % (ref 11.5–14.5)
PLATELET # BLD: 452 THOU/MM3 (ref 130–400)
PMV BLD AUTO: 10 FL (ref 9.4–12.4)
POTASSIUM, WHOLE BLOOD: 4.1 MEQ/L (ref 3.5–4.9)
RBC # BLD: 5.18 MILL/MM3 (ref 4.2–5.4)
SEG NEUTROPHILS: 64 % (ref 43–75)
SEGMENTED NEUTROPHILS ABSOLUTE COUNT: 4 THOU/MM3 (ref 1.8–7.7)
SODIUM, WHOLE BLOOD: 144 MEQ/L (ref 138–146)
TOTAL PROTEIN: 6.2 G/DL (ref 6.1–8)
WBC # BLD: 6.2 THOU/MM3 (ref 4.8–10.8)

## 2020-08-20 PROCEDURE — 80047 BASIC METABLC PNL IONIZED CA: CPT

## 2020-08-20 PROCEDURE — 99215 OFFICE O/P EST HI 40 MIN: CPT | Performed by: INTERNAL MEDICINE

## 2020-08-20 PROCEDURE — 80076 HEPATIC FUNCTION PANEL: CPT

## 2020-08-20 PROCEDURE — 85025 COMPLETE CBC W/AUTO DIFF WBC: CPT

## 2020-08-20 PROCEDURE — 6360000002 HC RX W HCPCS: Performed by: INTERNAL MEDICINE

## 2020-08-20 PROCEDURE — 99211 OFF/OP EST MAY X REQ PHY/QHP: CPT

## 2020-08-20 PROCEDURE — 96402 CHEMO HORMON ANTINEOPL SQ/IM: CPT

## 2020-08-20 PROCEDURE — 82374 ASSAY BLOOD CARBON DIOXIDE: CPT

## 2020-08-20 PROCEDURE — 36415 COLL VENOUS BLD VENIPUNCTURE: CPT

## 2020-08-20 PROCEDURE — 84520 ASSAY OF UREA NITROGEN: CPT

## 2020-08-20 RX ORDER — ANASTROZOLE 1 MG/1
1 TABLET ORAL DAILY
Qty: 30 TABLET | Refills: 11 | Status: SHIPPED | OUTPATIENT
Start: 2020-08-20 | End: 2021-07-06 | Stop reason: SDUPTHER

## 2020-08-20 RX ADMIN — GOSERELIN ACETATE 3.6 MG: 3.6 IMPLANT SUBCUTANEOUS at 12:33

## 2020-08-20 NOTE — PLAN OF CARE
Problem: Musculor/Skeletal Functional Status  Goal: Absence of falls  Outcome: Met This Shift  Note: No falls this admission   Intervention: Fall precautions  Note: Patient aware of fall precautions for here and at home -call light in reach while here       Problem: Intellectual/Education/Knowledge Deficit  Goal: Teaching initiated upon admission  Outcome: Met This Shift  Note: Reviewed zoladex  with patient, patient offered no questions or concerns. Patient verbalized understanding of drug being administered. Goal: Written Disposition Instruction form completed  Outcome: Met This Shift  Note: Discharge instructions given and reviewed with patient. All questions answered. Patient verbalized understanding   Intervention: Verbal/written education provided  Note: Discharge instruction sheets     Problem: Discharge Planning  Goal: Knowledge of discharge instructions  Description: Knowledge of discharge instructions  Outcome: Met This Shift  Note: Patient  able to teach back follow up appointments and when to call the doctor. Patient offers no questions at this time    Care plan reviewed with patient and she verbalized understanding of the plan of care and contributed to goal setting.

## 2020-08-20 NOTE — PROGRESS NOTES
[facility] CANCER CENTER  CANCER NETWORK OF [facility]  [facility] [address] [facility]  [address]  [address]  Dept: [phone]  Dept Fax: [phone]  Loc: [phone]     Subjective:      Chief Complaint:  Georgia Mondragon is a 44 y.o. with breast cancer. The patient is a  female who has been diagnosed with breast cancer. All of her [facility] her [facility] On October 14, 2019 the patient underwent a mastectomy by Dr. Taya Fu. She was found to have a 1 cm tumor located in the left breast.  In context of this condition, she was found to have estrogen receptor positive at 100%, progesterone receptors positive at 100%, and HER-2/kamran overexpression was negative. A total of four lymph nodes were all negative for malignancy. Therefore she was diagnosed with a stage Ia left breast cancer. She met with Dr. April Pennington at [facility] to discuss adjuvant treatment options. The patient elected not to proceed with chemotherapy treatment. She did receive a recommendation consider antiestrogen therapy. The patient is perimenopausal.      HPI: The patient returns today for follow-up regarding her history of breast cancer. Since being seen here last the patient has initiated therapy with Zoladex. She does have multiple somatic complaints related to Zoladex therapy. The patient complains of fatigue, joint pain, lower extremity swelling, and generalized weakness of which she all feels are related to the initiation of some Zoladex but are nonspecific in nature. She does not have specific complaints that would be felt to be directly related to a recurrence of malignancy. Despite her symptoms remains active with a good performance status. The patient is now scheduled to start therapy with aromatase inhibitor.   The rationale of adding aromatase chemotherapy at this time was again reviewed with the patient today and she is agreeable to proceed. She has not had fever, cough, shortness of breath or other signs of infection. Both her bowel and bladder habits have been stable. She does not report any vaginal bleeding since starting Zoladex therapy. Her ECOG performance status is level 0. PMH, SH, and FH:  I reviewed the patients medication list and allergy list as noted on the electronic medical record. The PMH, SH and FH were also reviewed as noted on the EMR. Review of Systems  Constitutional: Fatigue. HENT: Negative. Eyes: Negative. Respiratory: Negative. Cardiovascular: Negative. Gastrointestinal: Negative. Genitourinary: Negative. Musculoskeletal: Joint pain. Skin: Negative. Neurological: General weakness. Hematological: Negative. Psychiatric/Behavioral: Negative. Objective:   Physical Exam  Vitals:    08/20/20 1000   BP: 136/85   Pulse: 90   Resp: 18   Temp: 97 °F (36.1 °C)   SpO2: 96%   Vitals reviewed and are stable. Constitutional: Well-developed and well-nourished. No acute distress. HENT: Normocephalic and atraumatic. Eyes: Pupils are equal and reactive. No scleral icterus. Neck: Overall appearance is symmetrical. No identifiable masses. Chest: Inspection and palpation of chest is normal.  Pulmonary: Effort normal. No respiratory distress. Cardiovascular: RRR. No edema in any of the four extremities. Abdominal: Soft. No hepatomegaly or splenomegaly. Musculoskeletal: Gait is normal. Muscle strength and tone good. Neurological: Alert and oriented to person, place, and time. Judgment and thought content normal.  Skin: Skin is warm and dry. No rash. Psychiatric: Mood and affect appropriate for the clinical situation. Behavior is normal.      Data Analysis:   The following studies were reviewed with the patient today:    Hematology 8/20/2020 1/30/2020 3/15/2016   WBC 6.2 9.2 12.0 (H)   RBC 5.18 5.35 5.12   HGB 14.3 13.5 15.4   HCT 43.8 45.1 48.1 (H)   MCV 85 84.3 94.0   RDW 15.5 (H)  14.1    (H) 675 (H) 465 (H)     Assessment:   1. Left breast cancer stage Ia. 2.  Therapy with aromatase inhibitor. 3.  Therapy with Zoladex. 4.  Thrombocytosis. Plan:   1. Continue monthly Zoladex therapy. 2.  Start Arimidex therapy - 1 mg tablet daily. 3.  Monitor for toxicity related to both Zoladex and Arimidex. 4.  Monitor for recurrence of malignancy. 5.  Monitor platelet count and for any signs of abnormal bleeding/bruising. Tj Boles M.D. Medical Director: [facility]  Cancer Network [address] [place] [facility], [address] [place] [address], [address] [place] [address], [address] [place] [address] of the [facility] at the [facility]      **This report has been created using voice recognition software. It may contain minor errors which are inherent in voice recognition technology. **

## 2020-08-26 PROBLEM — Z79.818 USE OF GOSERELIN ACETATE (ZOLADEX): Status: ACTIVE | Noted: 2020-08-26

## 2020-08-26 PROBLEM — Z79.811 ENCOUNTER FOR MONITORING AROMATASE INHIBITOR THERAPY: Status: ACTIVE | Noted: 2020-08-26

## 2020-08-26 PROBLEM — Z51.81 ENCOUNTER FOR MONITORING AROMATASE INHIBITOR THERAPY: Status: ACTIVE | Noted: 2020-08-26

## 2020-08-26 PROBLEM — D75.839 THROMBOCYTOSIS: Status: ACTIVE | Noted: 2020-08-26

## 2020-09-04 ENCOUNTER — VIRTUAL VISIT (OUTPATIENT)
Dept: FAMILY MEDICINE CLINIC | Age: 50
End: 2020-09-04
Payer: COMMERCIAL

## 2020-09-04 PROCEDURE — 99214 OFFICE O/P EST MOD 30 MIN: CPT | Performed by: NURSE PRACTITIONER

## 2020-09-04 RX ORDER — PAROXETINE HYDROCHLORIDE 40 MG/1
40 TABLET, FILM COATED ORAL DAILY
Qty: 30 TABLET | Refills: 3 | Status: SHIPPED | OUTPATIENT
Start: 2020-09-04 | End: 2020-12-10 | Stop reason: SDUPTHER

## 2020-09-04 RX ORDER — PAROXETINE HYDROCHLORIDE 20 MG/1
20 TABLET, FILM COATED ORAL DAILY
Qty: 14 TABLET | Refills: 0 | Status: ON HOLD | OUTPATIENT
Start: 2020-09-04 | End: 2020-12-15 | Stop reason: ALTCHOICE

## 2020-09-04 RX ORDER — ROPINIROLE 0.5 MG/1
1 TABLET, FILM COATED ORAL NIGHTLY
Qty: 60 TABLET | Refills: 3 | Status: SHIPPED | OUTPATIENT
Start: 2020-09-04 | End: 2020-12-10 | Stop reason: SDUPTHER

## 2020-09-04 RX ORDER — TRAMADOL HYDROCHLORIDE 50 MG/1
50-100 TABLET ORAL EVERY 8 HOURS PRN
Qty: 90 TABLET | Refills: 0 | Status: SHIPPED | OUTPATIENT
Start: 2020-09-04 | End: 2020-10-04

## 2020-09-09 NOTE — PROGRESS NOTES
[address] [place] [facility] [address]  Dept: [phone]  Dept Fax: [phone]  Loc: [phone]  PROGRESS NOTE      VisitDate: 9/4/2020    Erin Bryant is a 44 y.o. female who presents today for:     Chief Complaint   Patient presents with    Medication Refill         Subjective:  Virtual visit through [employer] Patient is at home. Provider is at work in office. Patient complains of increased anxiety, agitation, poor concentration, irritability, depressive mood, insomnia over the past several months. Requesting to change to Paxil. Reports that symptoms developed shortly after initiation of Zoladex therapy. History of arthritis, breast cancer, stomach ulcers, type 2 diabetes, lymphedema, anxiety/depression. Patient also asked to increase Requip for restless leg. Patient also requesting refill of Ultram for her arthritic pain and generalized bone pain. Review of Systems   Psychiatric/Behavioral: Positive for agitation, decreased concentration, dysphoric mood and sleep disturbance. Negative for self-injury and suicidal ideas. The patient is nervous/anxious.       Past Medical History:   Diagnosis Date    Arthritis     Breast cancer ([facility] [address]     H/O bladder infections     History of stomach ulcers     Knee cartilage, torn, left     Type II or unspecified type diabetes mellitus without mention of complication, not stated as uncontrolled       Past Surgical History:   Procedure Laterality Date    COLONOSCOPY  2016    DILATION AND CURETTAGE OF UTERUS  04/17/2017    ENDOMETRIAL ABLATION      GASTRIC BAND  07/12/2011    Dr Eugenia Burroughs  5/30/12    Right total knee      TUBAL LIGATION       Family History   Problem Relation Age of Onset    Heart Disease Mother         CHF    Cancer Father     COPD Father     Diabetes Father     High Blood Pressure Father     High vitamin D (CHOLECALCIFEROL) 5000 units CAPS capsule Take 1 capsule by mouth daily 90 capsule 3    Glucose Blood (BLOOD GLUCOSE TEST STRIPS) STRP Contour EZ test strips and lancets test daily E11.9 50 strip 11    ondansetron (ZOFRAN) 4 MG tablet Take 1 tablet by mouth every 8 hours as needed for Nausea 60 tablet 1     No current facility-administered medications for this visit. Allergies   Allergen Reactions    Adhesive Tape     Sulfa Antibiotics Itching    Hydrocodone-Acetaminophen Nausea And Vomiting    Vicodin [Hydrocodone-Acetaminophen] Nausea And Vomiting     Health Maintenance   Topic Date Due    Pneumococcal 0-64 years Vaccine (1 of 1 - PPSV23) 12/22/1974    HIV screen  12/22/1983    Hepatitis B vaccine (1 of 3 - Risk 3-dose series) 12/22/1987    DTaP/Tdap/Td vaccine (1 - Tdap) 12/22/1987    Cervical cancer screen  10/01/2015    Shingles Vaccine (1 of 2) 12/22/2018    Diabetic microalbuminuria test  07/02/2019    Diabetic foot exam  02/22/2020    A1C test (Diabetic or Prediabetic)  03/26/2020    Lipid screen  03/26/2020    Diabetic retinal exam  04/04/2020    Flu vaccine (1) 09/01/2020    Breast cancer screen  09/01/2021    Colon cancer screen colonoscopy  03/08/2026    Hepatitis A vaccine  Aged Out    Hib vaccine  Aged Out    Meningococcal (ACWY) vaccine  Aged Out         Objective:     Physical Exam  Constitutional:       Appearance: Normal appearance. Neurological:      Mental Status: She is alert. Psychiatric:         Attention and Perception: Attention normal.         Mood and Affect: Mood is anxious. Speech: Speech is rapid and pressured. Behavior: Behavior is hyperactive. Thought Content: Thought content is not paranoid. Thought content does not include homicidal or suicidal ideation. Cognition and Memory: Cognition normal.         Judgment: Judgment normal.       There were no vitals taken for this visit. Impression/Plan:  1.  Use of anastrozole (Arimidex)    2. Restless leg syndrome    3. Chronic bilateral low back pain with sciatica, sciatica laterality unspecified    4. Use of goserelin acetate (Zoladex)    5. Malignant neoplasm of female breast, unspecified estrogen receptor status, unspecified laterality, unspecified site of breast ([facility]    6. Situational anxiety    7. Infiltrating ductal carcinoma of left breast ([facility]    8. Anxiety and depression    9. Situational mixed anxiety and depressive disorder    10. Type 2 diabetes mellitus without complication, without long-term current use of insulin ([facility]    11. Lymphedema    12. S/P left mastectomy    13. Primary insomnia      Requested Prescriptions     Signed Prescriptions Disp Refills    PARoxetine (PAXIL) 20 MG tablet 14 tablet 0     Sig: Take 1 tablet by mouth daily    PARoxetine (PAXIL) 40 MG tablet 30 tablet 3     Sig: Take 1 tablet by mouth daily    rOPINIRole (REQUIP) 0.5 MG tablet 60 tablet 3     Sig: Take 2 tablets by mouth nightly    traMADol (ULTRAM) 50 MG tablet 90 tablet 0     Sig: Take 1-2 tablets by mouth every 8 hours as needed for Pain for up to 30 days. Intended supply: 30days. Take lowest dose possible to manage pain     Orders Placed This Encounter   Procedures   [address], LAURA Marcum, Psychiatry, [facility]     Referral Priority:   Routine     Referral Type:   Eval and Treat     Referral Reason:   Specialty Services Required     Referred to Provider:   JERI Jacinto - LAURA     Requested Specialty:   Nurse Practitioner     Number of Visits Requested:   1       Patient giveneducational materials - see patient instructions. Discussed use, benefit, and side effects of prescribed medications. All patient questions answered. Pt voiced understanding. Reviewed health maintenance. Patient agreedwith treatment plan. Follow up as directed. Oars report reviewed. Patient instructed to wean from Lexapro and start Paxil. Consult with psychiatry.   All TRAM refilled. Requip increased to 1 mg nightly. 25 minutes spent with patient follow-up in office 1 to 2 months.     Electronically signed by JERI Jessica CNP on 9/9/2020 at 10:03 AM

## 2020-09-17 ENCOUNTER — HOSPITAL ENCOUNTER (OUTPATIENT)
Dept: INFUSION THERAPY | Age: 50
Discharge: HOME | End: 2020-09-17
Payer: COMMERCIAL

## 2020-09-17 VITALS
OXYGEN SATURATION: 98 % | TEMPERATURE: 98.2 F | WEIGHT: 237.2 LBS | SYSTOLIC BLOOD PRESSURE: 126 MMHG | HEART RATE: 91 BPM | RESPIRATION RATE: 18 BRPM | DIASTOLIC BLOOD PRESSURE: 77 MMHG | BODY MASS INDEX: 42.02 KG/M2

## 2020-09-17 DIAGNOSIS — Z17.0 MALIGNANT NEOPLASM OF OVERLAPPING SITES OF LEFT BREAST IN FEMALE, ESTROGEN RECEPTOR POSITIVE (HCC): Primary | ICD-10-CM

## 2020-09-17 DIAGNOSIS — C50.812 MALIGNANT NEOPLASM OF OVERLAPPING SITES OF LEFT BREAST IN FEMALE, ESTROGEN RECEPTOR POSITIVE (HCC): Primary | ICD-10-CM

## 2020-09-17 LAB
BACTERIA: ABNORMAL /HPF
BILIRUBIN URINE: NEGATIVE
BLOOD, URINE: NEGATIVE
CASTS 2: ABNORMAL /LPF
CASTS UA: ABNORMAL /LPF
CHARACTER, URINE: ABNORMAL
COLOR: YELLOW
CRYSTALS, UA: ABNORMAL
EPITHELIAL CELLS, UA: ABNORMAL /HPF
GLUCOSE URINE: NEGATIVE MG/DL
KETONES, URINE: NEGATIVE
LEUKOCYTE ESTERASE, URINE: ABNORMAL
MISCELLANEOUS 2: ABNORMAL
NITRITE, URINE: NEGATIVE
PH UA: 5.5 (ref 5–9)
PROTEIN UA: NEGATIVE
RBC URINE: ABNORMAL /HPF
RENAL EPITHELIAL, UA: ABNORMAL
SPECIFIC GRAVITY, URINE: 1.02 (ref 1–1.03)
UROBILINOGEN, URINE: 0.2 EU/DL (ref 0–1)
WBC UA: ABNORMAL /HPF
YEAST: ABNORMAL

## 2020-09-17 PROCEDURE — 81001 URINALYSIS AUTO W/SCOPE: CPT

## 2020-09-17 PROCEDURE — 99211 OFF/OP EST MAY X REQ PHY/QHP: CPT

## 2020-09-17 PROCEDURE — 6360000002 HC RX W HCPCS: Performed by: INTERNAL MEDICINE

## 2020-09-17 PROCEDURE — 99212 OFFICE O/P EST SF 10 MIN: CPT

## 2020-09-17 PROCEDURE — 96402 CHEMO HORMON ANTINEOPL SQ/IM: CPT

## 2020-09-17 RX ORDER — MAGNESIUM OXIDE 400 MG/1
400 TABLET ORAL DAILY
COMMUNITY
End: 2021-12-27

## 2020-09-17 RX ADMIN — GOSERELIN ACETATE 3.6 MG: 3.6 IMPLANT SUBCUTANEOUS at 11:05

## 2020-09-17 NOTE — PROGRESS NOTES
Pt discharged in stable condition with verbalization of discharge instructions all questions answered and all  belongings sent with patient. Patient tolerated zoladex without any complications or signs of a reaction.

## 2020-09-17 NOTE — PLAN OF CARE
Care plan reviewed with patient. Patient  verbalized understanding of the plan of care and contribute to goal setting. Problem: Intellectual/Education/Knowledge Deficit  Goal: Teaching initiated upon admission  Outcome: Met This Shift  Note: Patient verbalizes understanding to verbal information given on Zoladex,action and possible side effects. Aware to call MD if develop complications. Intervention: Verbal/written education provided  Note: Chemotherapy Teaching     What is Chemotherapy   Drug action [x]   Method of Administration [x]   Handouts given []     Side Effects  Nausea/vomiting [x]   Diarrhea [x]   Fatigue [x]   Signs / Symptoms of infection [x]   Neutropenia [x]   Thrombocytopenia [x]   Alopecia [x]   neuropathy [x]   [place] diet &  the importance of fluids [x]       Micellaneous  Importance of nutrition [x]   Importance of oral hygiene [x]   When to call the MD [x]   Monitoring labs [x]   Use of supportive services []     Explanation of Drug Regimen / Frequency  zoladex     Comments  Verbalized understanding to drug,action,side effects and when to call MD         Problem: Discharge Planning  Goal: Knowledge of discharge instructions  Description: Knowledge of discharge instructions  Outcome: Met This Shift  Note: Verbalized understanding of discharge instructions, follow ups and when to call doctor   Intervention: Discharge to appropriate level of care  Note: Discuss discharge instructions, follow ups and when to call doctor. Problem: Falls - Risk of:  Goal: Will remain free from falls  Description: Will remain free from falls  Outcome: Met This Shift  Note: Free from falls while in infusion center.  Verbalized understanding of fall prevention and to ask for assistance with ambulation   Intervention: Assess risk factors for falls  Description: Assess risk factors for falls  Note: Assess for fall risk, instruct to ask for assistance with ambulation

## 2020-09-17 NOTE — PLAN OF CARE
Problem: Intellectual/Education/Knowledge Deficit  Goal: Teaching initiated upon admission  9/17/2020 1149 by Fernie Fitzpatrick RN  Outcome: Met This Shift  Note: No falls this admission   9/17/2020 1148 by Rose Montiel RN  Outcome: Met This Shift  Note: Patient verbalizes understanding to verbal information given on Zoladex,action and possible side effects. Aware to call MD if develop complications. Intervention: Verbal/written education provided  9/17/2020 1149 by Fernie Fitzpatrick RN  Note: Discharge instructions given and reviewed with patient. All questions answered. Patient verbalized understanding   9/17/2020 1148 by Rose Montiel RN  Note: Chemotherapy Teaching     What is Chemotherapy   Drug action [x]   Method of Administration [x]   Handouts given []     Side Effects  Nausea/vomiting [x]   Diarrhea [x]   Fatigue [x]   Signs / Symptoms of infection [x]   Neutropenia [x]   Thrombocytopenia [x]   Alopecia [x]   neuropathy [x]   [place] diet &  the importance of fluids [x]       Micellaneous  Importance of nutrition [x]   Importance of oral hygiene [x]   When to call the MD [x]   Monitoring labs [x]   Use of supportive services []     Explanation of Drug Regimen / Frequency  zoladex     Comments  Verbalized understanding to drug,action,side effects and when to call MD         Problem: Discharge Planning  Goal: Knowledge of discharge instructions  Description: Knowledge of discharge instructions  9/17/2020 1149 by Fernie Fitzpatrick RN  Outcome: Met This Shift  Note: Patient able to teach back follow up appointments and when to call the doctor.  Patient offers no questions at this time   9/17/2020 1148 by Rose Montiel RN  Outcome: Met This Shift  Note: Verbalized understanding of discharge instructions, follow ups and when to call doctor   Intervention: Discharge to appropriate level of care  9/17/2020 1149 by Fernie Fitzpatrick RN  Note: Discharge home  9/17/2020 1148 by Rose Montiel RN  Note: Discuss discharge instructions, follow ups and when to call doctor. Problem: Falls - Risk of:  Goal: Will remain free from falls  Description: Will remain free from falls  9/17/2020 1149 by Carol Pinzon RN  Outcome: Met This Shift  Note: No falls this admission   9/17/2020 1148 by Keiko Acuna RN  Outcome: Met This Shift  Note: Free from falls while in infusion center. Verbalized understanding of fall prevention and to ask for assistance with ambulation   Intervention: Assess risk factors for falls  9/17/2020 1149 by Carol Pinzon RN  Note: Patient aware of fall precautions for here and at home -call light in reach while here    9/17/2020 1148 by Keiko Acuna RN  Note: Assess for fall risk, instruct to ask for assistance with ambulation    Care plan reviewed with patient and she verbalized understanding of the plan of care and contributed  Problem: Falls - Risk of:  Goal: Will remain free from falls  Description: Will remain free from falls  9/17/2020 1149 by Carol Pinzon RN  Outcome: Met This Shift  Note: No falls this admission   9/17/2020 1148 by Keiko Acuna RN  Outcome: Met This Shift  Note: Free from falls while in infusion center. Verbalized understanding of fall prevention and to ask for assistance with ambulation   Intervention: Assess risk factors for falls  9/17/2020 1149 by Carol Pinzon RN  Note: Patient aware of fall precautions for here and at home -call light in reach while here    9/17/2020 1148 by Keiko Acuna RN  Note: Assess for fall risk, instruct to ask for assistance with ambulation      Problem: Discharge Planning  Goal: Knowledge of discharge instructions  Description: Knowledge of discharge instructions  9/17/2020 1149 by Carol Pinzon RN  Outcome: Met This Shift  Note: Patient able to teach back follow up appointments and when to call the doctor.  Patient offers no questions at this time   9/17/2020 1148 by Keiko Acuna RN  Outcome: Met This Shift  Note: Verbalized understanding of discharge instructions, follow ups and when to call doctor   Intervention: Discharge to appropriate level of care  9/17/2020 1149 by Ambreen Lobato RN  Note: Discharge home  9/17/2020 1148 by Vilma Martinez RN  Note: Discuss discharge instructions, follow ups and when to call doctor. Problem: Intellectual/Education/Knowledge Deficit  Goal: Teaching initiated upon admission  9/17/2020 1149 by Ambreen Lobato RN  Outcome: Met This Shift  Note: No falls this admission   9/17/2020 1148 by Vilma Martinez RN  Outcome: Met This Shift  Note: Patient verbalizes understanding to verbal information given on Zoladex,action and possible side effects. Aware to call MD if develop complications. Intervention: Verbal/written education provided  9/17/2020 1149 by Ambreen Lobato RN  Note: Discharge instructions given and reviewed with patient. All questions answered. Patient verbalized understanding   9/17/2020 1148 by Vilma Martinez RN  Note: Chemotherapy Teaching     What is Chemotherapy   Drug action [x]   Method of Administration [x]   Handouts given []     Side Effects  Nausea/vomiting [x]   Diarrhea [x]   Fatigue [x]   Signs / Symptoms of infection [x]   Neutropenia [x]   Thrombocytopenia [x]   Alopecia [x]   neuropathy [x]   [place] diet &  the importance of fluids [x]       Micellaneous  Importance of nutrition [x]   Importance of oral hygiene [x]   When to call the MD [x]   Monitoring labs [x]   Use of supportive services []     Explanation of Drug Regimen / Frequency  zoladex     Comments  Verbalized understanding to drug,action,side effects and when to call MD        to goal setting.

## 2020-09-21 RX ORDER — PAROXETINE HYDROCHLORIDE 20 MG/1
TABLET, FILM COATED ORAL
Qty: 14 TABLET | Refills: 0 | OUTPATIENT
Start: 2020-09-21

## 2020-09-25 ENCOUNTER — NURSE ONLY (OUTPATIENT)
Dept: LAB | Age: 50
End: 2020-09-25

## 2020-10-01 RX ORDER — ALPRAZOLAM 0.5 MG/1
TABLET ORAL
Qty: 90 TABLET | Refills: 0 | Status: SHIPPED | OUTPATIENT
Start: 2020-10-01 | End: 2020-12-10 | Stop reason: SDUPTHER

## 2020-10-12 ENCOUNTER — TELEPHONE (OUTPATIENT)
Dept: ONCOLOGY | Age: 50
End: 2020-10-12

## 2020-10-15 ENCOUNTER — HOSPITAL ENCOUNTER (OUTPATIENT)
Dept: INFUSION THERAPY | Age: 50
Discharge: HOME | End: 2020-10-15

## 2020-12-08 ENCOUNTER — HOSPITAL ENCOUNTER (OUTPATIENT)
Age: 50
Discharge: HOME | End: 2020-12-08
Payer: COMMERCIAL

## 2020-12-08 LAB
ANION GAP SERPL CALCULATED.3IONS-SCNC: 11 MEQ/L (ref 8–16)
BASOPHILS # BLD: 0.9 %
BASOPHILS ABSOLUTE: 0.1 THOU/MM3 (ref 0–0.1)
BUN BLDV-MCNC: 17 MG/DL (ref 7–22)
CALCIUM SERPL-MCNC: 10.3 MG/DL (ref 8.5–10.5)
CHLORIDE BLD-SCNC: 100 MEQ/L (ref 98–111)
CO2: 29 MEQ/L (ref 23–33)
CREAT SERPL-MCNC: 0.5 MG/DL (ref 0.4–1.2)
EKG ATRIAL RATE: 93 BPM
EKG P AXIS: 62 DEGREES
EKG P-R INTERVAL: 164 MS
EKG Q-T INTERVAL: 376 MS
EKG QRS DURATION: 82 MS
EKG QTC CALCULATION (BAZETT): 467 MS
EKG R AXIS: 14 DEGREES
EKG T AXIS: 30 DEGREES
EKG VENTRICULAR RATE: 93 BPM
EOSINOPHIL # BLD: 4 %
EOSINOPHILS ABSOLUTE: 0.3 THOU/MM3 (ref 0–0.4)
ERYTHROCYTE [DISTWIDTH] IN BLOOD BY AUTOMATED COUNT: 13.9 % (ref 11.5–14.5)
ERYTHROCYTE [DISTWIDTH] IN BLOOD BY AUTOMATED COUNT: 44.8 FL (ref 35–45)
GFR SERPL CREATININE-BSD FRML MDRD: > 90 ML/MIN/1.73M2
GLUCOSE BLD-MCNC: 155 MG/DL (ref 70–108)
HCT VFR BLD CALC: 46.5 % (ref 37–47)
HEMOGLOBIN: 14.7 GM/DL (ref 12–16)
IMMATURE GRANS (ABS): 0.02 THOU/MM3 (ref 0–0.07)
IMMATURE GRANULOCYTES: 0.3 %
LYMPHOCYTES # BLD: 26.7 %
LYMPHOCYTES ABSOLUTE: 2.1 THOU/MM3 (ref 1–4.8)
MCH RBC QN AUTO: 28.2 PG (ref 26–33)
MCHC RBC AUTO-ENTMCNC: 31.6 GM/DL (ref 32.2–35.5)
MCV RBC AUTO: 89.3 FL (ref 81–99)
MONOCYTES # BLD: 6.8 %
MONOCYTES ABSOLUTE: 0.5 THOU/MM3 (ref 0.4–1.3)
NUCLEATED RED BLOOD CELLS: 0 /100 WBC
PLATELET # BLD: 511 THOU/MM3 (ref 130–400)
PMV BLD AUTO: 10.7 FL (ref 9.4–12.4)
POTASSIUM SERPL-SCNC: 4.5 MEQ/L (ref 3.5–5.2)
RBC # BLD: 5.21 MILL/MM3 (ref 4.2–5.4)
SEG NEUTROPHILS: 61.3 %
SEGMENTED NEUTROPHILS ABSOLUTE COUNT: 4.8 THOU/MM3 (ref 1.8–7.7)
SODIUM BLD-SCNC: 140 MEQ/L (ref 135–145)
WBC # BLD: 7.8 THOU/MM3 (ref 4.8–10.8)

## 2020-12-08 PROCEDURE — 93005 ELECTROCARDIOGRAM TRACING: CPT | Performed by: OBSTETRICS & GYNECOLOGY

## 2020-12-08 PROCEDURE — 36415 COLL VENOUS BLD VENIPUNCTURE: CPT

## 2020-12-08 PROCEDURE — 80048 BASIC METABOLIC PNL TOTAL CA: CPT

## 2020-12-08 PROCEDURE — U0003 INFECTIOUS AGENT DETECTION BY NUCLEIC ACID (DNA OR RNA); SEVERE ACUTE RESPIRATORY SYNDROME CORONAVIRUS 2 (SARS-COV-2) (CORONAVIRUS DISEASE [COVID-19]), AMPLIFIED PROBE TECHNIQUE, MAKING USE OF HIGH THROUGHPUT TECHNOLOGIES AS DESCRIBED BY CMS-2020-01-R: HCPCS

## 2020-12-08 PROCEDURE — 85025 COMPLETE CBC W/AUTO DIFF WBC: CPT

## 2020-12-08 PROCEDURE — 93010 ELECTROCARDIOGRAM REPORT: CPT | Performed by: NUCLEAR MEDICINE

## 2020-12-08 RX ORDER — ASCORBIC ACID 500 MG
500 TABLET ORAL DAILY
COMMUNITY
End: 2021-12-27

## 2020-12-08 NOTE — PROGRESS NOTES
NPO after midnight except sip of water with heart/BP meds  Follow all instructions given by surgeon including medications to hold  Bring insurance card and photo ID  Shower the night before or morning of procedure with liquid antibacterial soap  Wear comfortable clothing  Do not bring jewelry or valuables  Bring list of medications with dosage and how often taken if not reviewed   needed at discharge at least 23years old  Call PAT at [phone] for questions    Instructed to call surgery center at [phone] upon arrival to speak with  before entering building. Covid screen due  at [facility] 6 to 7 days before procedure. Pt plans to have completed on 12/8/2020 at [facility] outpatient express lab. In preparation for their surgical procedure above patient was screened for Obstructive Sleep Apnea (EVELIN) using the STOP-Bang Questionnaire by the Pre-Admission Testing department. This is a pre-surgical screening tool for patient safety and serves as a recommendation, this WILL NOT cause cancellation of surgery. STOP-Bang Questionnaire  * Do you currently see a pulmonologist?  No    Pt has sleep apnea and uses a CPAP machine. Instructed to bring to surgery center for surgery. Voiced understanding.

## 2020-12-08 NOTE — PROGRESS NOTES
Covid screening questionnaire complete and negative for symptoms or exposure see chart for documentation.

## 2020-12-10 ENCOUNTER — VIRTUAL VISIT (OUTPATIENT)
Dept: FAMILY MEDICINE CLINIC | Age: 50
End: 2020-12-10
Payer: COMMERCIAL

## 2020-12-10 ENCOUNTER — PATIENT MESSAGE (OUTPATIENT)
Dept: FAMILY MEDICINE CLINIC | Age: 50
End: 2020-12-10

## 2020-12-10 LAB — SARS-COV-2: NOT DETECTED

## 2020-12-10 PROCEDURE — 99214 OFFICE O/P EST MOD 30 MIN: CPT | Performed by: NURSE PRACTITIONER

## 2020-12-10 RX ORDER — AZITHROMYCIN 250 MG/1
TABLET, FILM COATED ORAL
Qty: 6 TABLET | Refills: 0 | Status: SHIPPED | OUTPATIENT
Start: 2020-12-10 | End: 2020-12-20

## 2020-12-10 RX ORDER — ALPRAZOLAM 0.5 MG/1
TABLET ORAL
Qty: 90 TABLET | Refills: 0 | Status: SHIPPED | OUTPATIENT
Start: 2020-12-10 | End: 2021-01-04

## 2020-12-10 RX ORDER — FLUTICASONE PROPIONATE 50 MCG
1 SPRAY, SUSPENSION (ML) NASAL DAILY
Qty: 3 BOTTLE | Refills: 3 | Status: SHIPPED | OUTPATIENT
Start: 2020-12-10 | End: 2021-11-26

## 2020-12-10 RX ORDER — METFORMIN HYDROCHLORIDE 500 MG/1
TABLET, EXTENDED RELEASE ORAL
Qty: 180 TABLET | Refills: 3 | Status: SHIPPED | OUTPATIENT
Start: 2020-12-10 | End: 2020-12-29 | Stop reason: SDUPTHER

## 2020-12-10 RX ORDER — TRAMADOL HYDROCHLORIDE 50 MG/1
50 TABLET ORAL EVERY 6 HOURS PRN
Qty: 90 TABLET | Refills: 0 | Status: SHIPPED | OUTPATIENT
Start: 2020-12-10 | End: 2021-01-09

## 2020-12-10 RX ORDER — PAROXETINE HYDROCHLORIDE 40 MG/1
40 TABLET, FILM COATED ORAL DAILY
Qty: 90 TABLET | Refills: 3 | Status: SHIPPED | OUTPATIENT
Start: 2020-12-10 | End: 2021-02-11 | Stop reason: SDUPTHER

## 2020-12-10 RX ORDER — ROPINIROLE 0.5 MG/1
1 TABLET, FILM COATED ORAL NIGHTLY
Qty: 180 TABLET | Refills: 3 | Status: SHIPPED | OUTPATIENT
Start: 2020-12-10 | End: 2022-01-07

## 2020-12-10 RX ORDER — PREDNISONE 10 MG/1
10 TABLET ORAL 2 TIMES DAILY
Qty: 10 TABLET | Refills: 0 | Status: SHIPPED | OUTPATIENT
Start: 2020-12-10 | End: 2020-12-15

## 2020-12-11 NOTE — TELEPHONE ENCOUNTER
From: Nicholas Rhoades  To: JERI Elaine - CNP  Sent: 12/10/2020 7:30 PM EST  Subject: Prescription Question    Devin Shaw, I need a refill on my voltaren please! I couldn't remember earlier today when we spoke!   Thanks,   Kenny Vieira

## 2020-12-13 NOTE — PROGRESS NOTES
Years since quittin.2    Smokeless tobacco: Never Used   Substance Use Topics    Alcohol use: Yes     Comment: rarely      Current Outpatient Medications   Medication Sig Dispense Refill    PARoxetine (PAXIL) 40 MG tablet Take 1 tablet by mouth daily 90 tablet 3    rOPINIRole (REQUIP) 0.5 MG tablet Take 2 tablets by mouth nightly 180 tablet 3    fluticasone (FLONASE) 50 MCG/ACT nasal spray 1 spray by Nasal route daily 3 Bottle 3    predniSONE (DELTASONE) 10 MG tablet Take 1 tablet by mouth 2 times daily for 5 days 10 tablet 0    azithromycin (ZITHROMAX Z-EMILY) 250 MG tablet 2 pills orally for 1 day, then 1 pill orally for 4 days 6 tablet 0    ALPRAZolam (XANAX) 0.5 MG tablet take 1 tablet by mouth three times a day if needed for sleep or anxiety 90 tablet 0    metFORMIN (GLUCOPHAGE-XR) 500 MG extended release tablet take 2 tablets by mouth every morning 180 tablet 3    traMADol (ULTRAM) 50 MG tablet Take 1 tablet by mouth every 6 hours as needed for Pain for up to 30 days. 90 tablet 0    diclofenac sodium (VOLTAREN) 1 % GEL Apply 2 g topically 4 times daily 150 g 5    vitamin C (ASCORBIC ACID) 500 MG tablet Take 500 mg by mouth daily      ZINC PO Take 1 tablet by mouth daily      magnesium oxide (MAG-OX) 400 MG tablet Take 400 mg by mouth daily      PARoxetine (PAXIL) 20 MG tablet Take 1 tablet by mouth daily 14 tablet 0    anastrozole (ARIMIDEX) 1 MG tablet Take 1 tablet by mouth daily 30 tablet 11    Glucosamine-Chondroitin (GLUCOSAMINE CHONDR COMPLEX PO) Take by mouth 2 times daily      vitamin E 400 UNIT capsule Take 400 Units by mouth 2 times daily      loratadine (CLARITIN) 10 MG tablet Take 1 tablet by mouth daily 30 tablet 5    diclofenac sodium (VOLTAREN) 1 % GEL Apply 4 g topically 4 times daily 100 g 5    triamcinolone (KENALOG) 0.025 % cream Apply topically 2 times daily.  80 g 1  Albuterol Sulfate, sensor, (PROAIR DIGIHALER) 108 (90 Base) MCG/ACT AEPB Inhale 2 puffs into the lungs 4 times daily as needed (wheeze) 1 each 0    cyclobenzaprine (FLEXERIL) 10 MG tablet take 1 tablet by mouth twice a day if needed for muscle spasm 60 tablet 1    albuterol sulfate HFA (PROAIR HFA) 108 (90 Base) MCG/ACT inhaler Inhale 2 puffs into the lungs every 6 hours as needed for Wheezing 1 Inhaler 3    vitamin D (CHOLECALCIFEROL) 5000 units CAPS capsule Take 1 capsule by mouth daily 90 capsule 3    Glucose Blood (BLOOD GLUCOSE TEST STRIPS) STRP Contour EZ test strips and lancets test daily E11.9 50 strip 11    ondansetron (ZOFRAN) 4 MG tablet Take 1 tablet by mouth every 8 hours as needed for Nausea 60 tablet 1     No current facility-administered medications for this visit. Allergies   Allergen Reactions    Sulfa Antibiotics Itching    Adhesive Tape Rash and Other (See Comments)     Blisters with prolonged use    Hydrocodone-Acetaminophen Nausea And Vomiting    Vicodin [Hydrocodone-Acetaminophen] Nausea And Vomiting     Health Maintenance   Topic Date Due    Pneumococcal 0-64 years Vaccine (1 of 1 - PPSV23) 12/22/1974    HIV screen  12/22/1983    Hepatitis B vaccine (1 of 3 - Risk 3-dose series) 12/22/1987    Cervical cancer screen  10/01/2015    Shingles Vaccine (1 of 2) 12/22/2018    Diabetic microalbuminuria test  07/02/2019    Diabetic foot exam  02/22/2020    Lipid screen  03/26/2020    Diabetic retinal exam  04/04/2020    A1C test (Diabetic or Prediabetic)  01/09/2021    Breast cancer screen  09/01/2021    Colon cancer screen colonoscopy  03/08/2026    DTaP/Tdap/Td vaccine (2 - Td) 09/22/2030    Flu vaccine  Completed    Hepatitis A vaccine  Aged Out    Hib vaccine  Aged Out    Meningococcal (ACWY) vaccine  Aged Out         Objective:     Physical Exam  Constitutional:       Appearance: Normal appearance. Neurological:      General: No focal deficit present. Mental Status: She is alert and oriented to person, place, and time. Psychiatric:         Attention and Perception: Attention normal.         Mood and Affect: Mood is anxious. Speech: Speech normal.         Behavior: Behavior normal. Behavior is cooperative. Thought Content: Thought content normal.         Cognition and Memory: Cognition normal.         Judgment: Judgment normal.       [facility] 2020 (Approximate) Comment: tubal ligation/ablation      Impression/Plan:  1. Acute sinusitis, recurrence not specified, unspecified location    2. Restless leg syndrome    3. Acute serous otitis media of left ear, recurrence not specified    4. Insomnia, unspecified type    5. Other chronic pain    6. Lymphedema      Requested Prescriptions     Signed Prescriptions Disp Refills    PARoxetine (PAXIL) 40 MG tablet 90 tablet 3     Sig: Take 1 tablet by mouth daily    rOPINIRole (REQUIP) 0.5 MG tablet 180 tablet 3     Sig: Take 2 tablets by mouth nightly    fluticasone (FLONASE) 50 MCG/ACT nasal spray 3 Bottle 3     Si spray by Nasal route daily    predniSONE (DELTASONE) 10 MG tablet 10 tablet 0     Sig: Take 1 tablet by mouth 2 times daily for 5 days    azithromycin (ZITHROMAX Z-EMILY) 250 MG tablet 6 tablet 0     Si pills orally for 1 day, then 1 pill orally for 4 days    ALPRAZolam (XANAX) 0.5 MG tablet 90 tablet 0     Sig: take 1 tablet by mouth three times a day if needed for sleep or anxiety    metFORMIN (GLUCOPHAGE-XR) 500 MG extended release tablet 180 tablet 3     Sig: take 2 tablets by mouth every morning    traMADol (ULTRAM) 50 MG tablet 90 tablet 0     Sig: Take 1 tablet by mouth every 6 hours as needed for Pain for up to 30 days. No orders of the defined types were placed in this encounter. Patient giveneducational materials - see patient instructions. Discussed use, benefit, and side effects of prescribed medications. All patient questions answered. Pt voiced understanding. Reviewed health maintenance. Patient agreedwith treatment plan. Follow up as directed. Patient declines Covid testing at this time. I am in agreement. Refills provided. OARRS report reviewed. Z-Rui, prednisone prescribed . 20 minutes       Electronically signed by JERI Snell CNP on 12/13/2020 at 1:05 PM

## 2020-12-15 ENCOUNTER — TELEPHONE (OUTPATIENT)
Dept: OBGYN | Age: 50
End: 2020-12-15

## 2020-12-15 ENCOUNTER — ANESTHESIA (OUTPATIENT)
Dept: OPERATING ROOM | Age: 50
End: 2020-12-15
Payer: COMMERCIAL

## 2020-12-15 ENCOUNTER — HOSPITAL ENCOUNTER (OUTPATIENT)
Age: 50
Setting detail: OUTPATIENT SURGERY
Discharge: HOME | End: 2020-12-15
Attending: OBSTETRICS & GYNECOLOGY | Admitting: OBSTETRICS & GYNECOLOGY
Payer: COMMERCIAL

## 2020-12-15 ENCOUNTER — ANESTHESIA EVENT (OUTPATIENT)
Dept: OPERATING ROOM | Age: 50
End: 2020-12-15
Payer: COMMERCIAL

## 2020-12-15 VITALS
HEIGHT: 63 IN | SYSTOLIC BLOOD PRESSURE: 135 MMHG | HEART RATE: 100 BPM | BODY MASS INDEX: 42.17 KG/M2 | WEIGHT: 238 LBS | TEMPERATURE: 96.6 F | DIASTOLIC BLOOD PRESSURE: 92 MMHG | OXYGEN SATURATION: 98 % | RESPIRATION RATE: 18 BRPM

## 2020-12-15 VITALS — DIASTOLIC BLOOD PRESSURE: 91 MMHG | OXYGEN SATURATION: 96 % | TEMPERATURE: 99.3 F | SYSTOLIC BLOOD PRESSURE: 161 MMHG

## 2020-12-15 PROBLEM — N93.8 DUB (DYSFUNCTIONAL UTERINE BLEEDING): Status: ACTIVE | Noted: 2020-12-15

## 2020-12-15 LAB
ABO: NORMAL
ANTIBODY SCREEN: NORMAL
GLUCOSE BLD-MCNC: 188 MG/DL (ref 70–108)
GLUCOSE BLD-MCNC: 234 MG/DL (ref 70–108)
GLUCOSE BLD-MCNC: 240 MG/DL (ref 70–108)
RH FACTOR: NORMAL

## 2020-12-15 PROCEDURE — 88307 TISSUE EXAM BY PATHOLOGIST: CPT

## 2020-12-15 PROCEDURE — 2709999900 HC NON-CHARGEABLE SUPPLY: Performed by: OBSTETRICS & GYNECOLOGY

## 2020-12-15 PROCEDURE — 7100000010 HC PHASE II RECOVERY - FIRST 15 MIN: Performed by: OBSTETRICS & GYNECOLOGY

## 2020-12-15 PROCEDURE — 82948 REAGENT STRIP/BLOOD GLUCOSE: CPT

## 2020-12-15 PROCEDURE — 86850 RBC ANTIBODY SCREEN: CPT

## 2020-12-15 PROCEDURE — 6360000002 HC RX W HCPCS: Performed by: OBSTETRICS & GYNECOLOGY

## 2020-12-15 PROCEDURE — 7100000001 HC PACU RECOVERY - ADDTL 15 MIN: Performed by: OBSTETRICS & GYNECOLOGY

## 2020-12-15 PROCEDURE — 3600000019 HC SURGERY ROBOT ADDTL 15MIN: Performed by: OBSTETRICS & GYNECOLOGY

## 2020-12-15 PROCEDURE — 6370000000 HC RX 637 (ALT 250 FOR IP): Performed by: OBSTETRICS & GYNECOLOGY

## 2020-12-15 PROCEDURE — 86901 BLOOD TYPING SEROLOGIC RH(D): CPT

## 2020-12-15 PROCEDURE — 86900 BLOOD TYPING SEROLOGIC ABO: CPT

## 2020-12-15 PROCEDURE — 7100000011 HC PHASE II RECOVERY - ADDTL 15 MIN: Performed by: OBSTETRICS & GYNECOLOGY

## 2020-12-15 PROCEDURE — 7100000000 HC PACU RECOVERY - FIRST 15 MIN: Performed by: OBSTETRICS & GYNECOLOGY

## 2020-12-15 PROCEDURE — 3700000001 HC ADD 15 MINUTES (ANESTHESIA): Performed by: OBSTETRICS & GYNECOLOGY

## 2020-12-15 PROCEDURE — 2500000003 HC RX 250 WO HCPCS: Performed by: OBSTETRICS & GYNECOLOGY

## 2020-12-15 PROCEDURE — 2580000003 HC RX 258: Performed by: NURSE ANESTHETIST, CERTIFIED REGISTERED

## 2020-12-15 PROCEDURE — 6360000002 HC RX W HCPCS: Performed by: ANESTHESIOLOGY

## 2020-12-15 PROCEDURE — 36415 COLL VENOUS BLD VENIPUNCTURE: CPT

## 2020-12-15 PROCEDURE — S2900 ROBOTIC SURGICAL SYSTEM: HCPCS | Performed by: OBSTETRICS & GYNECOLOGY

## 2020-12-15 PROCEDURE — 3600000009 HC SURGERY ROBOT BASE: Performed by: OBSTETRICS & GYNECOLOGY

## 2020-12-15 PROCEDURE — 6370000000 HC RX 637 (ALT 250 FOR IP): Performed by: ANESTHESIOLOGY

## 2020-12-15 PROCEDURE — 2500000003 HC RX 250 WO HCPCS: Performed by: NURSE ANESTHETIST, CERTIFIED REGISTERED

## 2020-12-15 PROCEDURE — 6360000002 HC RX W HCPCS: Performed by: NURSE ANESTHETIST, CERTIFIED REGISTERED

## 2020-12-15 PROCEDURE — 3700000000 HC ANESTHESIA ATTENDED CARE: Performed by: OBSTETRICS & GYNECOLOGY

## 2020-12-15 RX ORDER — PROMETHAZINE HYDROCHLORIDE 25 MG/1
12.5 TABLET ORAL EVERY 6 HOURS PRN
Status: DISCONTINUED | OUTPATIENT
Start: 2020-12-15 | End: 2020-12-15 | Stop reason: HOSPADM

## 2020-12-15 RX ORDER — SODIUM CHLORIDE 9 MG/ML
INJECTION, SOLUTION INTRAVENOUS CONTINUOUS PRN
Status: DISCONTINUED | OUTPATIENT
Start: 2020-12-15 | End: 2020-12-15 | Stop reason: SDUPTHER

## 2020-12-15 RX ORDER — SODIUM CHLORIDE 0.9 % (FLUSH) 0.9 %
10 SYRINGE (ML) INJECTION PRN
Status: DISCONTINUED | OUTPATIENT
Start: 2020-12-15 | End: 2020-12-15 | Stop reason: HOSPADM

## 2020-12-15 RX ORDER — PROPOFOL 10 MG/ML
INJECTION, EMULSION INTRAVENOUS PRN
Status: DISCONTINUED | OUTPATIENT
Start: 2020-12-15 | End: 2020-12-15 | Stop reason: SDUPTHER

## 2020-12-15 RX ORDER — DIPHENHYDRAMINE HYDROCHLORIDE 50 MG/ML
12.5 INJECTION INTRAMUSCULAR; INTRAVENOUS
Status: DISCONTINUED | OUTPATIENT
Start: 2020-12-15 | End: 2020-12-15 | Stop reason: HOSPADM

## 2020-12-15 RX ORDER — KETOROLAC TROMETHAMINE 30 MG/ML
INJECTION, SOLUTION INTRAMUSCULAR; INTRAVENOUS PRN
Status: DISCONTINUED | OUTPATIENT
Start: 2020-12-15 | End: 2020-12-15 | Stop reason: SDUPTHER

## 2020-12-15 RX ORDER — SODIUM CHLORIDE, SODIUM LACTATE, POTASSIUM CHLORIDE, CALCIUM CHLORIDE 600; 310; 30; 20 MG/100ML; MG/100ML; MG/100ML; MG/100ML
INJECTION, SOLUTION INTRAVENOUS CONTINUOUS
Status: DISCONTINUED | OUTPATIENT
Start: 2020-12-15 | End: 2020-12-15 | Stop reason: HOSPADM

## 2020-12-15 RX ORDER — HYDRALAZINE HYDROCHLORIDE 20 MG/ML
INJECTION INTRAMUSCULAR; INTRAVENOUS PRN
Status: DISCONTINUED | OUTPATIENT
Start: 2020-12-15 | End: 2020-12-15 | Stop reason: SDUPTHER

## 2020-12-15 RX ORDER — BUPIVACAINE HYDROCHLORIDE 5 MG/ML
INJECTION, SOLUTION EPIDURAL; INTRACAUDAL PRN
Status: DISCONTINUED | OUTPATIENT
Start: 2020-12-15 | End: 2020-12-15 | Stop reason: ALTCHOICE

## 2020-12-15 RX ORDER — LABETALOL 20 MG/4 ML (5 MG/ML) INTRAVENOUS SYRINGE
5 4 TIMES DAILY PRN
Status: DISCONTINUED | OUTPATIENT
Start: 2020-12-15 | End: 2020-12-15 | Stop reason: HOSPADM

## 2020-12-15 RX ORDER — FENTANYL CITRATE 50 UG/ML
50 INJECTION, SOLUTION INTRAMUSCULAR; INTRAVENOUS EVERY 5 MIN PRN
Status: DISCONTINUED | OUTPATIENT
Start: 2020-12-15 | End: 2020-12-15 | Stop reason: HOSPADM

## 2020-12-15 RX ORDER — OXYCODONE HYDROCHLORIDE AND ACETAMINOPHEN 5; 325 MG/1; MG/1
1 TABLET ORAL EVERY 4 HOURS PRN
Status: DISCONTINUED | OUTPATIENT
Start: 2020-12-15 | End: 2020-12-15 | Stop reason: HOSPADM

## 2020-12-15 RX ORDER — FENTANYL CITRATE 50 UG/ML
INJECTION, SOLUTION INTRAMUSCULAR; INTRAVENOUS PRN
Status: DISCONTINUED | OUTPATIENT
Start: 2020-12-15 | End: 2020-12-15 | Stop reason: SDUPTHER

## 2020-12-15 RX ORDER — ONDANSETRON 2 MG/ML
INJECTION INTRAMUSCULAR; INTRAVENOUS PRN
Status: DISCONTINUED | OUTPATIENT
Start: 2020-12-15 | End: 2020-12-15 | Stop reason: SDUPTHER

## 2020-12-15 RX ORDER — ROCURONIUM BROMIDE 10 MG/ML
INJECTION, SOLUTION INTRAVENOUS PRN
Status: DISCONTINUED | OUTPATIENT
Start: 2020-12-15 | End: 2020-12-15 | Stop reason: SDUPTHER

## 2020-12-15 RX ORDER — MEPERIDINE HYDROCHLORIDE 25 MG/ML
INJECTION INTRAMUSCULAR; INTRAVENOUS; SUBCUTANEOUS
Status: DISCONTINUED
Start: 2020-12-15 | End: 2020-12-15 | Stop reason: HOSPADM

## 2020-12-15 RX ORDER — LABETALOL 20 MG/4 ML (5 MG/ML) INTRAVENOUS SYRINGE
PRN
Status: DISCONTINUED | OUTPATIENT
Start: 2020-12-15 | End: 2020-12-15 | Stop reason: SDUPTHER

## 2020-12-15 RX ORDER — ACETAMINOPHEN 325 MG/1
650 TABLET ORAL EVERY 4 HOURS PRN
Status: DISCONTINUED | OUTPATIENT
Start: 2020-12-15 | End: 2020-12-15 | Stop reason: HOSPADM

## 2020-12-15 RX ORDER — ONDANSETRON 2 MG/ML
4 INJECTION INTRAMUSCULAR; INTRAVENOUS EVERY 6 HOURS PRN
Status: DISCONTINUED | OUTPATIENT
Start: 2020-12-15 | End: 2020-12-15 | Stop reason: HOSPADM

## 2020-12-15 RX ORDER — OXYCODONE HYDROCHLORIDE AND ACETAMINOPHEN 5; 325 MG/1; MG/1
2 TABLET ORAL EVERY 4 HOURS PRN
Status: DISCONTINUED | OUTPATIENT
Start: 2020-12-15 | End: 2020-12-15 | Stop reason: HOSPADM

## 2020-12-15 RX ORDER — MEPERIDINE HYDROCHLORIDE 25 MG/ML
12.5 INJECTION INTRAMUSCULAR; INTRAVENOUS; SUBCUTANEOUS EVERY 5 MIN PRN
Status: DISCONTINUED | OUTPATIENT
Start: 2020-12-15 | End: 2020-12-15 | Stop reason: HOSPADM

## 2020-12-15 RX ORDER — LIDOCAINE HYDROCHLORIDE 20 MG/ML
INJECTION, SOLUTION EPIDURAL; INFILTRATION; INTRACAUDAL; PERINEURAL PRN
Status: DISCONTINUED | OUTPATIENT
Start: 2020-12-15 | End: 2020-12-15 | Stop reason: SDUPTHER

## 2020-12-15 RX ORDER — SUCCINYLCHOLINE/SOD CL,ISO/PF 200MG/10ML
SYRINGE (ML) INTRAVENOUS PRN
Status: DISCONTINUED | OUTPATIENT
Start: 2020-12-15 | End: 2020-12-15 | Stop reason: SDUPTHER

## 2020-12-15 RX ORDER — SODIUM CHLORIDE 0.9 % (FLUSH) 0.9 %
10 SYRINGE (ML) INJECTION EVERY 12 HOURS SCHEDULED
Status: DISCONTINUED | OUTPATIENT
Start: 2020-12-15 | End: 2020-12-15 | Stop reason: HOSPADM

## 2020-12-15 RX ORDER — MORPHINE SULFATE 2 MG/ML
2 INJECTION, SOLUTION INTRAMUSCULAR; INTRAVENOUS EVERY 5 MIN PRN
Status: DISCONTINUED | OUTPATIENT
Start: 2020-12-15 | End: 2020-12-15 | Stop reason: HOSPADM

## 2020-12-15 RX ORDER — OXYCODONE HYDROCHLORIDE AND ACETAMINOPHEN 5; 325 MG/1; MG/1
1 TABLET ORAL EVERY 6 HOURS PRN
Qty: 28 TABLET | Refills: 0 | Status: SHIPPED | OUTPATIENT
Start: 2020-12-15 | End: 2020-12-22

## 2020-12-15 RX ORDER — ONDANSETRON 2 MG/ML
4 INJECTION INTRAMUSCULAR; INTRAVENOUS
Status: DISCONTINUED | OUTPATIENT
Start: 2020-12-15 | End: 2020-12-15 | Stop reason: HOSPADM

## 2020-12-15 RX ORDER — MEPERIDINE HYDROCHLORIDE 25 MG/ML
50 INJECTION INTRAMUSCULAR; INTRAVENOUS; SUBCUTANEOUS ONCE
Status: COMPLETED | OUTPATIENT
Start: 2020-12-15 | End: 2020-12-15

## 2020-12-15 RX ORDER — KETOROLAC TROMETHAMINE 30 MG/ML
30 INJECTION, SOLUTION INTRAMUSCULAR; INTRAVENOUS EVERY 6 HOURS
Status: DISCONTINUED | OUTPATIENT
Start: 2020-12-15 | End: 2020-12-15 | Stop reason: HOSPADM

## 2020-12-15 RX ORDER — HYDRALAZINE HYDROCHLORIDE 20 MG/ML
5 INJECTION INTRAMUSCULAR; INTRAVENOUS EVERY 10 MIN PRN
Status: DISCONTINUED | OUTPATIENT
Start: 2020-12-15 | End: 2020-12-15 | Stop reason: HOSPADM

## 2020-12-15 RX ORDER — IBUPROFEN 800 MG/1
800 TABLET ORAL EVERY 8 HOURS PRN
Status: DISCONTINUED | OUTPATIENT
Start: 2020-12-15 | End: 2020-12-15 | Stop reason: HOSPADM

## 2020-12-15 RX ORDER — DEXAMETHASONE SODIUM PHOSPHATE 10 MG/ML
INJECTION, EMULSION INTRAMUSCULAR; INTRAVENOUS PRN
Status: DISCONTINUED | OUTPATIENT
Start: 2020-12-15 | End: 2020-12-15 | Stop reason: SDUPTHER

## 2020-12-15 RX ADMIN — FENTANYL CITRATE 50 MCG: 50 INJECTION, SOLUTION INTRAMUSCULAR; INTRAVENOUS at 11:07

## 2020-12-15 RX ADMIN — FENTANYL CITRATE 50 MCG: 50 INJECTION, SOLUTION INTRAMUSCULAR; INTRAVENOUS at 07:47

## 2020-12-15 RX ADMIN — HYDRALAZINE HYDROCHLORIDE 2.5 MG: 20 INJECTION INTRAMUSCULAR; INTRAVENOUS at 09:00

## 2020-12-15 RX ADMIN — MEPERIDINE HYDROCHLORIDE 50 MG: 25 INJECTION INTRAMUSCULAR; INTRAVENOUS; SUBCUTANEOUS at 12:59

## 2020-12-15 RX ADMIN — HYDRALAZINE HYDROCHLORIDE 3 MG: 20 INJECTION INTRAMUSCULAR; INTRAVENOUS at 08:39

## 2020-12-15 RX ADMIN — ROCURONIUM BROMIDE 10 MG: 10 INJECTION INTRAVENOUS at 09:38

## 2020-12-15 RX ADMIN — LABETALOL 20 MG/4 ML (5 MG/ML) INTRAVENOUS SYRINGE 2.5 MG: at 09:54

## 2020-12-15 RX ADMIN — FENTANYL CITRATE 50 MCG: 50 INJECTION, SOLUTION INTRAMUSCULAR; INTRAVENOUS at 09:14

## 2020-12-15 RX ADMIN — OXYCODONE HYDROCHLORIDE AND ACETAMINOPHEN 2 TABLET: 5; 325 TABLET ORAL at 11:59

## 2020-12-15 RX ADMIN — CEFAZOLIN 2 G: 10 INJECTION, POWDER, FOR SOLUTION INTRAVENOUS at 08:00

## 2020-12-15 RX ADMIN — LABETALOL 20 MG/4 ML (5 MG/ML) INTRAVENOUS SYRINGE 5 MG: at 09:30

## 2020-12-15 RX ADMIN — FENTANYL CITRATE 50 MCG: 50 INJECTION, SOLUTION INTRAMUSCULAR; INTRAVENOUS at 09:25

## 2020-12-15 RX ADMIN — FENTANYL CITRATE 50 MCG: 50 INJECTION, SOLUTION INTRAMUSCULAR; INTRAVENOUS at 08:24

## 2020-12-15 RX ADMIN — FENTANYL CITRATE 50 MCG: 50 INJECTION, SOLUTION INTRAMUSCULAR; INTRAVENOUS at 07:55

## 2020-12-15 RX ADMIN — SODIUM CHLORIDE: 9 INJECTION, SOLUTION INTRAVENOUS at 09:43

## 2020-12-15 RX ADMIN — FENTANYL CITRATE 50 MCG: 50 INJECTION, SOLUTION INTRAMUSCULAR; INTRAVENOUS at 10:52

## 2020-12-15 RX ADMIN — ROCURONIUM BROMIDE 20 MG: 10 INJECTION INTRAVENOUS at 09:04

## 2020-12-15 RX ADMIN — LIDOCAINE HYDROCHLORIDE 60 MG: 20 INJECTION, SOLUTION EPIDURAL; INFILTRATION; INTRACAUDAL; PERINEURAL at 07:47

## 2020-12-15 RX ADMIN — ROCURONIUM BROMIDE 50 MG: 10 INJECTION INTRAVENOUS at 07:53

## 2020-12-15 RX ADMIN — Medication 140 MG: at 07:47

## 2020-12-15 RX ADMIN — SUGAMMADEX 200 MG: 100 INJECTION, SOLUTION INTRAVENOUS at 10:21

## 2020-12-15 RX ADMIN — HYDRALAZINE HYDROCHLORIDE 2.5 MG: 20 INJECTION INTRAMUSCULAR; INTRAVENOUS at 08:48

## 2020-12-15 RX ADMIN — PROPOFOL 200 MG: 10 INJECTION, EMULSION INTRAVENOUS at 07:47

## 2020-12-15 RX ADMIN — HYDRALAZINE HYDROCHLORIDE 2 MG: 20 INJECTION INTRAMUSCULAR; INTRAVENOUS at 08:30

## 2020-12-15 RX ADMIN — KETOROLAC TROMETHAMINE 30 MG: 30 INJECTION, SOLUTION INTRAMUSCULAR at 10:10

## 2020-12-15 RX ADMIN — KETOROLAC TROMETHAMINE 30 MG: 30 INJECTION, SOLUTION INTRAMUSCULAR at 10:18

## 2020-12-15 RX ADMIN — ROCURONIUM BROMIDE 20 MG: 10 INJECTION INTRAVENOUS at 08:16

## 2020-12-15 RX ADMIN — SODIUM CHLORIDE: 9 INJECTION, SOLUTION INTRAVENOUS at 07:40

## 2020-12-15 RX ADMIN — ONDANSETRON HYDROCHLORIDE 4 MG: 4 INJECTION, SOLUTION INTRAMUSCULAR; INTRAVENOUS at 07:53

## 2020-12-15 RX ADMIN — FENTANYL CITRATE 50 MCG: 50 INJECTION, SOLUTION INTRAMUSCULAR; INTRAVENOUS at 08:15

## 2020-12-15 RX ADMIN — INSULIN HUMAN 4 UNITS: 100 INJECTION, SOLUTION PARENTERAL at 11:21

## 2020-12-15 RX ADMIN — DEXAMETHASONE SODIUM PHOSPHATE 5 MG: 10 INJECTION, EMULSION INTRAMUSCULAR; INTRAVENOUS at 09:42

## 2020-12-15 ASSESSMENT — PULMONARY FUNCTION TESTS
PIF_VALUE: 38
PIF_VALUE: 40
PIF_VALUE: 42
PIF_VALUE: 42
PIF_VALUE: 25
PIF_VALUE: 35
PIF_VALUE: 40
PIF_VALUE: 38
PIF_VALUE: 40
PIF_VALUE: 40
PIF_VALUE: 38
PIF_VALUE: 40
PIF_VALUE: 1
PIF_VALUE: 25
PIF_VALUE: 40
PIF_VALUE: 42
PIF_VALUE: 3
PIF_VALUE: 42
PIF_VALUE: 40
PIF_VALUE: 40
PIF_VALUE: 42
PIF_VALUE: 40
PIF_VALUE: 42
PIF_VALUE: 20
PIF_VALUE: 40
PIF_VALUE: 40
PIF_VALUE: 38
PIF_VALUE: 25
PIF_VALUE: 42
PIF_VALUE: 40
PIF_VALUE: 40
PIF_VALUE: 25
PIF_VALUE: 40
PIF_VALUE: 42
PIF_VALUE: 1
PIF_VALUE: 3
PIF_VALUE: 40
PIF_VALUE: 42
PIF_VALUE: 25
PIF_VALUE: 40
PIF_VALUE: 38
PIF_VALUE: 40
PIF_VALUE: 42
PIF_VALUE: 40
PIF_VALUE: 38
PIF_VALUE: 40
PIF_VALUE: 4
PIF_VALUE: 30
PIF_VALUE: 5
PIF_VALUE: 25
PIF_VALUE: 27
PIF_VALUE: 40
PIF_VALUE: 0
PIF_VALUE: 1
PIF_VALUE: 25
PIF_VALUE: 42
PIF_VALUE: 42
PIF_VALUE: 25
PIF_VALUE: 2
PIF_VALUE: 35
PIF_VALUE: 42
PIF_VALUE: 40
PIF_VALUE: 40
PIF_VALUE: 42
PIF_VALUE: 35
PIF_VALUE: 42
PIF_VALUE: 38
PIF_VALUE: 38
PIF_VALUE: 42
PIF_VALUE: 25
PIF_VALUE: 1
PIF_VALUE: 40
PIF_VALUE: 42
PIF_VALUE: 25
PIF_VALUE: 22
PIF_VALUE: 3
PIF_VALUE: 37
PIF_VALUE: 40
PIF_VALUE: 40
PIF_VALUE: 42
PIF_VALUE: 4
PIF_VALUE: 42
PIF_VALUE: 40
PIF_VALUE: 35
PIF_VALUE: 40
PIF_VALUE: 20
PIF_VALUE: 41
PIF_VALUE: 40
PIF_VALUE: 42
PIF_VALUE: 35
PIF_VALUE: 0
PIF_VALUE: 42
PIF_VALUE: 40
PIF_VALUE: 27
PIF_VALUE: 40
PIF_VALUE: 40
PIF_VALUE: 3
PIF_VALUE: 38
PIF_VALUE: 42
PIF_VALUE: 7
PIF_VALUE: 42
PIF_VALUE: 35
PIF_VALUE: 40
PIF_VALUE: 23
PIF_VALUE: 42
PIF_VALUE: 5
PIF_VALUE: 42
PIF_VALUE: 42
PIF_VALUE: 40
PIF_VALUE: 20
PIF_VALUE: 38
PIF_VALUE: 40
PIF_VALUE: 40
PIF_VALUE: 38
PIF_VALUE: 42
PIF_VALUE: 38
PIF_VALUE: 40
PIF_VALUE: 5
PIF_VALUE: 38
PIF_VALUE: 42
PIF_VALUE: 42
PIF_VALUE: 40
PIF_VALUE: 30
PIF_VALUE: 3
PIF_VALUE: 38
PIF_VALUE: 38
PIF_VALUE: 25
PIF_VALUE: 8
PIF_VALUE: 40
PIF_VALUE: 42
PIF_VALUE: 3
PIF_VALUE: 42
PIF_VALUE: 4
PIF_VALUE: 38
PIF_VALUE: 7
PIF_VALUE: 40
PIF_VALUE: 42
PIF_VALUE: 40
PIF_VALUE: 42
PIF_VALUE: 3
PIF_VALUE: 0
PIF_VALUE: 19
PIF_VALUE: 20
PIF_VALUE: 42
PIF_VALUE: 25
PIF_VALUE: 40
PIF_VALUE: 21
PIF_VALUE: 42
PIF_VALUE: 8
PIF_VALUE: 42
PIF_VALUE: 42
PIF_VALUE: 21
PIF_VALUE: 23
PIF_VALUE: 42

## 2020-12-15 ASSESSMENT — PAIN SCALES - GENERAL
PAINLEVEL_OUTOF10: 8
PAINLEVEL_OUTOF10: 9
PAINLEVEL_OUTOF10: 8
PAINLEVEL_OUTOF10: 9

## 2020-12-15 ASSESSMENT — PAIN - FUNCTIONAL ASSESSMENT: PAIN_FUNCTIONAL_ASSESSMENT: 0-10

## 2020-12-15 NOTE — ANESTHESIA PRE PROCEDURE
loratadine (CLARITIN) 10 MG tablet Take 1 tablet by mouth daily 6/17/20  Yes JERI Miller CNP   diclofenac sodium (VOLTAREN) 1 % GEL Apply 4 g topically 4 times daily 6/17/20  Yes JERI Lopez CNP   triamcinolone (KENALOG) 0.025 % cream Apply topically 2 times daily. 4/13/20  Yes JERI Miller CNP   Albuterol Sulfate, sensor, (PROAIR DIGIHALER) 108 (90 Base) MCG/ACT AEPB Inhale 2 puffs into the lungs 4 times daily as needed (wheeze) 3/31/20  Yes Maya Edmond MD   albuterol sulfate HFA (PROAIR HFA) 108 (90 Base) MCG/ACT inhaler Inhale 2 puffs into the lungs every 6 hours as needed for Wheezing 12/9/19  Yes JERI Miller CNP   vitamin D (CHOLECALCIFEROL) 5000 units CAPS capsule Take 1 capsule by mouth daily 2/19/19  Yes JERI Miller CNP   ondansetron (ZOFRAN) 4 MG tablet Take 1 tablet by mouth every 8 hours as needed for Nausea 2/7/18  Yes JERI Miller CNP   predniSONE (DELTASONE) 10 MG tablet Take 1 tablet by mouth 2 times daily for 5 days 12/10/20 12/15/20  JERI Miller CNP   metFORMIN (GLUCOPHAGE-XR) 500 MG extended release tablet take 2 tablets by mouth every morning 12/10/20   JERI Miller CNP   cyclobenzaprine (FLEXERIL) 10 MG tablet take 1 tablet by mouth twice a day if needed for muscle spasm 3/24/20   Maya Edmond MD   Glucose Blood (BLOOD GLUCOSE TEST STRIPS) STRP Contour EZ test strips and lancets test daily E11.9 5/7/18   JERI Miller CNP       Current medications:    Current Facility-Administered Medications   Medication Dose Route Frequency Provider Last Rate Last Admin    lactated ringers infusion   Intravenous Continuous Dulce Perez MD        ceFAZolin (ANCEF) 2 g in dextrose 5 % 50 mL IVPB  2 g Intravenous On Call to [id number] Patrick Oconnor MD           Allergies:     Allergies   Allergen Reactions    Sulfa Antibiotics Itching    Adhesive Tape Rash and Other (See Comments)     Blisters with prolonged use  Hydrocodone-Acetaminophen Nausea And Vomiting    Vicodin [Hydrocodone-Acetaminophen] Nausea And Vomiting       Problem List:    Patient Active Problem List   Diagnosis Code    Type 2 diabetes mellitus without complication, without long-term current use of insulin (HCC) E11.9    Primary insomnia F51.01    Malignant neoplasm of overlapping sites of left breast in female, estrogen receptor positive ([facility] C50.812, Z17.0    Encounter for monitoring aromatase inhibitor therapy Z51.81, Z79.811    Use of goserelin acetate (Zoladex) Z79.818    Thrombocytosis (HCC) D47.3       Past Medical History:        Diagnosis Date    Arthritis     Breast cancer ([facility]     H/O bladder infections     History of stomach ulcers     Knee cartilage, torn, left     PONV (postoperative nausea and vomiting)     Type II or unspecified type diabetes mellitus without mention of complication, not stated as uncontrolled        Past Surgical History:        Procedure Laterality Date    COLONOSCOPY      DILATION AND CURETTAGE OF UTERUS  2017    ENDOMETRIAL ABLATION      GASTRIC BAND  2011     [address]  [address]    Right total knee      MASTECTOMY Left     TUBAL LIGATION         Social History:    Social History     Tobacco Use    Smoking status: Former Smoker     Quit date: 2000     Years since quittin.2    Smokeless tobacco: Never Used   Substance Use Topics    Alcohol use: Yes     Comment: rarely                                Counseling given: Not Answered      Vital Signs (Current):   Vitals:    20 1153 12/15/20 0652   BP:  131/76   Pulse:  109   Resp:  20   Temp:  97.3 °F (36.3 °C)   TempSrc:  Temporal   SpO2:  93%   Weight: 237 lb (107.5 kg) 238 lb (108 kg)   Height: 5' 3\" (1.6 m)                                               BP Readings from Last 3 Encounters:   12/15/20 131/76   20 126/77   20 136/85 NPO Status: Time of last liquid consumption: 2330                        Time of last solid consumption: 2330                        Date of last liquid consumption: 12/14/20                        Date of last solid food consumption: 12/14/20    BMI:   Wt Readings from Last 3 Encounters:   12/15/20 238 lb (108 kg)   09/17/20 237 lb 3.2 oz (107.6 kg)   08/20/20 237 lb 12.8 oz (107.9 kg)     Body mass index is 42.16 kg/m². CBC:   Lab Results   Component Value Date    WBC 7.8 12/08/2020    RBC 5.21 12/08/2020    RBC 5.12 03/15/2016    HGB 14.7 12/08/2020    HCT 46.5 12/08/2020    MCV 89.3 12/08/2020    RDW 15.5 08/20/2020     12/08/2020       CMP:   Lab Results   Component Value Date     12/08/2020    K 4.5 12/08/2020     12/08/2020    CO2 29 12/08/2020    BUN 17 12/08/2020    CREATININE 0.5 12/08/2020    LABGLOM >90 12/08/2020    GLUCOSE 155 12/08/2020    GLUCOSE 121 03/26/2019    PROT 6.2 08/20/2020    CALCIUM 10.3 12/08/2020    BILITOT 0.6 08/20/2020    ALKPHOS 62 08/20/2020    AST 34 08/20/2020    ALT 43 08/20/2020       POC Tests:   Recent Labs     12/15/20  0709   POCGLU 188*       Coags: No results found for: PROTIME, INR, APTT    HCG (If Applicable): No results found for: PREGTESTUR, PREGSERUM, HCG, HCGQUANT     ABGs: No results found for: PHART, PO2ART, UOQ8HNV, QGG4SJI, BEART, N9ZSDSKI     Type & Screen (If Applicable):  No results found for: LABABO, LABRH    Drug/Infectious Status (If Applicable):  No results found for: HIV, HEPCAB    COVID-19 Screening (If Applicable):   Lab Results   Component Value Date    COVID19 Not Detected 12/08/2020         Anesthesia Evaluation     history of anesthetic complications: PONV.   Airway: Mallampati: II       Mouth opening: > = 3 FB Dental:          Pulmonary:       (-) COPD                           Cardiovascular:                      Neuro/Psych:      (-) CVA           GI/Hepatic/Renal:             Endo/Other:                     Abdominal: Vascular:                                        Anesthesia Plan      general     ASA 2       Induction: intravenous. Anesthetic plan and risks discussed with patient. Plan discussed with CRNA.                   Alfred Nicholson MD   12/15/2020

## 2020-12-15 NOTE — PROGRESS NOTES
1029 Pt to PACU per cart. Monitor applied. O2 on at 6L/NC. Skin warm and moist. Cooling blanket applied. Resp easy. POX 96% om 6L. Report from [facility] and [facility]. 4 port sites intact with steri strips - no bleeding noted from any sites. Magda pad in place and without drainage. Ice pack applied to abdomen. 1032 Pt arouses to name but follows no commands - goes back to sleep readily. 1045 Pt resting with eyes closed. Resp easy. POx 97% on 6L. O2 decreased to 4L. 1052 Pt awake briefly and states pain \"9\" when asked. Denies nausea. Medicated with Fentanyl 50 mcg IV.  1100 Pt asleep with snoring resp. Pox 94% on 4L.   1108 Fentanyl 50 mcg IV repeated for pain  (cramping) \"9\"  1110 POC glucose 240 - Dr Petra Robles informed and order received. 1121 4 Units Humulin R insulin given subq as ordered. 1122 Pt voided mod amt per bedpan. 1125 POx 97% on 4L - O2 decreased to 2L. 1130 POx 97% on 2L - O2 discontinued. 1135 POx 98% on room air. Pt meets requirements to move to phase 2 recovery. 1138 Pt to bathroom per wheelchair. Voided. 1145 Pt returned to room per wheelchair. 4 port sites on abd intact and without drainage. Pt to phase 2 recovery  - remains in same room and on cart. 1150 Pt c/o nausea - refuses medication for  Nausea - \" want my pain for cramps. \"   1200 Medicated with percocet 2 tabs as ordered for cramps \" 8\".  at bedside. 1215 Pt resting quietly quietly on cart.  at bedside. 1230 Repeat glucose 234 - Dr Petra Robles updated - no orders given. Ru Brandt 79 Dr Kemi Brewer in to see pt - gave order for Demerol IM for continued pain - not conrtrolled by Percocet. 1259 Demerol 50 mg IM given for continued cramping \"8\". 1310 Pt assisted to bathroom - gait steady. - voided and returned to cart in room . 1340 Pt states pain \" 6\" - ready to go. IV discontinued.  went to car to get pt's clothing. 5 Pt assisted to bathroom - voided. 1350 Assisted pt to dress while in bathroom. Pt alert and steady. 1400 Pt assisted to room - gait steady. States pain \"6\"   1406 Discharge instructions reviewed with pt and . Both voice understanding. 26 Pt discharged per wheelchair to car driven by . Pt alert and stable.

## 2020-12-15 NOTE — OP NOTE
[address] [place] [address]                                OPERATIVE REPORT    PATIENT NAME: Cathi Covert                 :        1970  MED REC NO:   [id number]                           ROOM:  ACCOUNT NO:   [de-identified]                           ADMIT DATE: 12/15/2020  PROVIDER:     COURTNEY Webb Query:  12/15/2020    PREOPERATIVE DIAGNOSES:  1.  DUB. 2.  Failed endometrial ablation. 3.  Prior history of breast cancer. POSTOPERATIVE DIAGNOSES:  1.  DUB. 2.  Failed endometrial ablation. 3.  Prior history of breast cancer. PROCEDURE:  Robotic-assisted total laparoscopic hysterectomy with  bilateral salpingo-oophorectomy. ANESTHESIA:   General.    COMPLICATIONS:  None. ESTIMATED BLOOD LOSS:  75 mL. SURGEON:  Liyah Kincaid MD    ASSISTANT:  Dr. Cathy Ahmadi and Dr. Aman Jackson. DESCRIPTION OF PROCEDURE:  The patient was taken to the operating room  where general anesthesia was found to be adequate. She was prepped and  draped in dorsal lithotomy position with the Yelllanrens stirrups and  secured to the operating room table with beanbag device. Weighted  speculum was placed in vagina. Cervix was grasped with tenaculum. It  was sounded. There was significant intrauterine adhesions that were  broken up with a sound and cervical dilators. A large VCare was then  sutured into place at 6 and 12 o'clock. A Trevino catheter was placed in  the urinary bladder and attention was turned to the abdomen. Supraumbilical skin was injected with Marcaine and incised with a  scalpel. An 8-mm trocar was placed directly into the abdomen and it was  insufflated with CO2 gas. A robotic trocar was placed on the left  medial and inferior from her Lap-Band ports on the right as well as  assistant port in the left mid quadrant.   A da Sreekanth X model was then side docked on the patient's right and fenestrated bipolar monopolar  scissors were placed within the robotic ports and surgeon went to the  console for remainder of procedure. The round ligaments were ligated  and transected bilaterally and the infundibulopelvic ligaments were  ligated and transected after the ureters were visualized well below the  area of dissection. Broad ligament was then opened bilaterally and  dissected down to the level of the uterine vessels. The bladder flap  was then created and the bladder was dissected anteriorly off of the  cervix. There was significant vasculature noted within the bladder  dissection multiple vessels that need to ligate during this process. The uterine vessels were then skeletonized, ligated, transected, and  dissected laterally off the VCare cup. Posterior colpotomy was then  created and carried around laterally until the uterus was amputated at  the external cervical os. The specimen was then removed through the  vaginal cuff and sent to pathology for evaluation. The vaginal cuff was  then reapproximated with 0 Vicryl figure-of-eight sutures and all areas  remained hemostatic. The abdomen was then irrigated with warm normal  saline. Upon completion and all instruments were removed from the  abdomen, it was then manually exsufflated. Skin was closed with 4-0  Vicryl subcuticular stitch. Sponge, lap, and needle counts were correct  x2. The patient was taken to recovery room in stable condition. She  received Ancef prior to procedure.         Jorje Doan M.D.    D: 12/15/2020 10:28:13       T: 12/15/2020 10:34:49     [email or web address]  Job#: [id number]     Doc#: [id number]    CC:

## 2020-12-15 NOTE — H&P
Department of  Obstetrics and Gynecology  History and Physical  Date of Admission:  12/15/2020    CHIEF COMPLAINT:   DUB, hx of breast cancer    History obtained from patient    HISTORY OF PRESENT ILLNESS:     The patient is a 44 y.o. female with significant past medical history of Breast cancer who presents with ongoing bleeding after endometrial ablation. Plan for [address]    Past Medical History:        Diagnosis Date    Arthritis     Breast cancer ([address]     H/O bladder infections     History of stomach ulcers     Knee cartilage, torn, left     PONV (postoperative nausea and vomiting)     Type II or unspecified type diabetes mellitus without mention of complication, not stated as uncontrolled      Past Surgical History:        Procedure Laterality Date    COLONOSCOPY  2016    DILATION AND CURETTAGE OF UTERUS  04/17/2017    ENDOMETRIAL ABLATION      GASTRIC BAND  07/12/2011    Dr Elizabeth Oliveros  5/30/12    Right total knee      MASTECTOMY Left     TUBAL LIGATION         meds:  Current Facility-Administered Medications:     lactated ringers infusion, , Intravenous, Continuous, Annamarie Lopez MD    ceFAZolin (ANCEF) 2 g in dextrose 5 % 50 mL IVPB, 2 g, Intravenous, On Call to OR, Annamarie Lopez MD       Allergies:  Sulfa antibiotics, Adhesive tape, Hydrocodone-acetaminophen, and Vicodin [hydrocodone-acetaminophen]     Social History:  TOBACCO:   reports that she quit smoking about 20 years ago. She has never used smokeless tobacco.  ETOH:   reports current alcohol use. DRUGS:   reports no history of drug use.     Family History:       Problem Relation Age of Onset    Heart Disease Mother         CHF    Cancer Father     COPD Father     Diabetes Father     High Blood Pressure Father     High Cholesterol Father         PHYSICAL EXAM:    Vitals:

## 2020-12-15 NOTE — PROGRESS NOTES
1340 Pt requesting cool air to be applied. Pt states pain is improving and is now 6/10. Denies needs at present. Call light in reach.

## 2020-12-15 NOTE — ANESTHESIA POSTPROCEDURE EVALUATION
Department of Anesthesiology  Postprocedure Note    Patient: Ana Krueger  MRN: [id number]  YOB: 1970  Date of evaluation: 12/15/2020  Time:  11:52 AM     Procedure Summary     Date: 12/15/20 Room / Location: [address] [place] [address] 05 / 138 [facility]    Anesthesia Start: 0562 Anesthesia Stop: 2541    Procedure: ROBOTIC HYSYER WITH BILATERAL SALPING-OOPHORECTOMY (N/A ) Diagnosis: (BREAST CA/MENOMETROHAGIA)    Surgeons: Sheng Morrison MD Responsible Provider: Myrtie Sever, MD    Anesthesia Type: general ASA Status: 2          Anesthesia Type: No value filed. Young Phase I: Young Score: 8    Young Phase II:      Last vitals: Reviewed and per EMR flowsheets.        Anesthesia Post Evaluation    Patient location during evaluation: PACU  Patient participation: complete - patient participated  Level of consciousness: awake  Airway patency: patent  Nausea & Vomiting: no vomiting and nausea  Complications: no  Cardiovascular status: hemodynamically unstable  Respiratory status: acceptable  Hydration status: stable

## 2020-12-21 RX ORDER — LORATADINE 10 MG/1
TABLET ORAL
Qty: 90 TABLET | Refills: 3 | Status: SHIPPED | OUTPATIENT
Start: 2020-12-21 | End: 2021-12-06

## 2020-12-28 NOTE — TELEPHONE ENCOUNTER
Took call from pt. We discussed pain med options post RATH earlier that day. Pt was comfortable with instructions.

## 2020-12-29 RX ORDER — FLUCONAZOLE 100 MG/1
100 TABLET ORAL DAILY
Qty: 10 TABLET | Refills: 0 | Status: SHIPPED | OUTPATIENT
Start: 2020-12-29 | End: 2021-07-02 | Stop reason: SDUPTHER

## 2020-12-29 RX ORDER — METFORMIN HYDROCHLORIDE 500 MG/1
1000 TABLET, EXTENDED RELEASE ORAL 2 TIMES DAILY
Qty: 360 TABLET | Refills: 3 | Status: SHIPPED | OUTPATIENT
Start: 2020-12-29 | End: 2022-02-04

## 2021-01-04 DIAGNOSIS — G47.00 INSOMNIA, UNSPECIFIED TYPE: ICD-10-CM

## 2021-01-12 RX ORDER — ALPRAZOLAM 0.5 MG/1
TABLET ORAL
Qty: 90 TABLET | Refills: 0 | Status: SHIPPED | OUTPATIENT
Start: 2021-01-12 | End: 2021-07-01

## 2021-01-21 ENCOUNTER — OFFICE VISIT (OUTPATIENT)
Dept: ONCOLOGY | Age: 51
End: 2021-01-21
Payer: COMMERCIAL

## 2021-01-21 ENCOUNTER — HOSPITAL ENCOUNTER (OUTPATIENT)
Dept: INFUSION THERAPY | Age: 51
Discharge: HOME | End: 2021-01-21
Payer: COMMERCIAL

## 2021-01-21 VITALS
WEIGHT: 246.4 LBS | SYSTOLIC BLOOD PRESSURE: 135 MMHG | RESPIRATION RATE: 18 BRPM | DIASTOLIC BLOOD PRESSURE: 83 MMHG | TEMPERATURE: 96.7 F | HEIGHT: 63 IN | HEART RATE: 108 BPM | OXYGEN SATURATION: 95 % | BODY MASS INDEX: 43.66 KG/M2

## 2021-01-21 DIAGNOSIS — C50.812 MALIGNANT NEOPLASM OF OVERLAPPING SITES OF LEFT BREAST IN FEMALE, ESTROGEN RECEPTOR POSITIVE (HCC): Primary | ICD-10-CM

## 2021-01-21 DIAGNOSIS — Z51.81 ENCOUNTER FOR MONITORING AROMATASE INHIBITOR THERAPY: ICD-10-CM

## 2021-01-21 DIAGNOSIS — Z79.811 ENCOUNTER FOR MONITORING AROMATASE INHIBITOR THERAPY: ICD-10-CM

## 2021-01-21 DIAGNOSIS — Z17.0 MALIGNANT NEOPLASM OF OVERLAPPING SITES OF LEFT BREAST IN FEMALE, ESTROGEN RECEPTOR POSITIVE (HCC): Primary | ICD-10-CM

## 2021-01-21 PROCEDURE — 99211 OFF/OP EST MAY X REQ PHY/QHP: CPT

## 2021-01-21 PROCEDURE — 99212 OFFICE O/P EST SF 10 MIN: CPT | Performed by: INTERNAL MEDICINE

## 2021-01-21 NOTE — PROGRESS NOTES
[facility]  CANCER NETWORK OF [facility]  [facility] [address] [facility]  [address] [facility] [address]  [address]  Dept: [phone]  Dept Fax: [phone]  Loc: [phone]     Subjective:      Chief Complaint:  Kirby Rea is a 44 y.o. with breast cancer. The patient is a  female who has been diagnosed with breast cancer. All of her previous treatment for her breast cancer has been done on at the [facility]. On October 14, 2019 the patient underwent a mastectomy by Dr. Jose Alfredo Duong. She was found to have a 1 cm tumor located in the left breast.  In context of this condition, she was found to have estrogen receptor positive at 100%, progesterone receptors positive at 100%, and HER-2/kamran overexpression was negative. A total of four lymph nodes were all negative for malignancy. Therefore she was diagnosed with a stage Ia left breast cancer. She met with Dr. Adenike Membreno at [facility] to discuss adjuvant treatment options. The patient elected not to proceed with chemotherapy treatment. She did receive a recommendation consider antiestrogen therapy. The patient is perimenopausal.      HPI:     The patient is here today for follow-up regarding her history of stage I breast cancer. She recently is underwent a hysterectomy with associated oophorectomy. The patient states that she is slowly recovering from the surgery and continues to have some pain related to this procedure. Its been approximately 5 weeks since her procedure was completed. The patient continues to take Arimidex therapy without significant complications. She is on Arimidex 1 mg tablet daily and denies any grade 3 or grade 4 toxicity. She has not had fever, cough, shortness of breath or other signs of infection. The patient's bowel and bladder habits have been stable. She has not seen blood in her stool or urine.   The patient's ECOG

## 2021-02-11 ENCOUNTER — OFFICE VISIT (OUTPATIENT)
Dept: FAMILY MEDICINE CLINIC | Age: 51
End: 2021-02-11
Payer: COMMERCIAL

## 2021-02-11 VITALS
HEART RATE: 102 BPM | BODY MASS INDEX: 43.4 KG/M2 | SYSTOLIC BLOOD PRESSURE: 122 MMHG | RESPIRATION RATE: 18 BRPM | DIASTOLIC BLOOD PRESSURE: 88 MMHG | TEMPERATURE: 96.5 F | WEIGHT: 245 LBS

## 2021-02-11 DIAGNOSIS — E11.9 TYPE 2 DIABETES MELLITUS WITHOUT COMPLICATION, WITHOUT LONG-TERM CURRENT USE OF INSULIN (HCC): Primary | ICD-10-CM

## 2021-02-11 DIAGNOSIS — M25.50 ARTHRALGIA, UNSPECIFIED JOINT: ICD-10-CM

## 2021-02-11 PROCEDURE — 99213 OFFICE O/P EST LOW 20 MIN: CPT | Performed by: FAMILY MEDICINE

## 2021-02-11 RX ORDER — CYCLOBENZAPRINE HCL 10 MG
TABLET ORAL
Qty: 60 TABLET | Refills: 1 | Status: ON HOLD | OUTPATIENT
Start: 2021-02-11 | End: 2021-11-08 | Stop reason: HOSPADM

## 2021-02-11 RX ORDER — PAROXETINE HYDROCHLORIDE 40 MG/1
40 TABLET, FILM COATED ORAL DAILY
Qty: 90 TABLET | Refills: 3 | Status: SHIPPED | OUTPATIENT
Start: 2021-02-11 | End: 2022-01-31

## 2021-02-11 RX ORDER — ONDANSETRON 4 MG/1
4 TABLET, FILM COATED ORAL EVERY 8 HOURS PRN
Qty: 60 TABLET | Refills: 1 | Status: ON HOLD | OUTPATIENT
Start: 2021-02-11 | End: 2021-11-07 | Stop reason: HOSPADM

## 2021-02-11 ASSESSMENT — PATIENT HEALTH QUESTIONNAIRE - PHQ9
SUM OF ALL RESPONSES TO PHQ9 QUESTIONS 1 & 2: 0
SUM OF ALL RESPONSES TO PHQ QUESTIONS 1-9: 0

## 2021-02-20 ASSESSMENT — ENCOUNTER SYMPTOMS
DIARRHEA: 0
CHEST TIGHTNESS: 0
COUGH: 0
SORE THROAT: 0
VOMITING: 0
ABDOMINAL PAIN: 0
SHORTNESS OF BREATH: 0
NAUSEA: 0
BACK PAIN: 0
RHINORRHEA: 0
EYES NEGATIVE: 1

## 2021-02-20 NOTE — PROGRESS NOTES
[address] [facility] [address]  Dept: [phone]  Dept Fax: [phone]  Loc: [phone]  PROGRESS NOTE      VisitDate: 2/11/2021    Roger Boyce is a 44 y.o. female who presents today for:     Chief Complaint   Patient presents with    3 Month Follow-Up     bone pain, most painful is the right wrist. Pain also in lower back, neck and legs.  Medication Refill     misplaced her paxil that had 60 days left on it, pharmacy gave her a temp 30 days,but she still can't find it. Subjective:  HPI  Follow-up diabetes, due for blood work. Having bone pain specially in the wrist no known injury. Also misplaced a prescription needs refill of Paxil    Review of Systems   Constitutional: Negative for activity change, appetite change, fatigue and fever. HENT: Negative for congestion, rhinorrhea and sore throat. Eyes: Negative. Respiratory: Negative for cough, chest tightness and shortness of breath. Cardiovascular: Negative for chest pain and palpitations. Gastrointestinal: Negative for abdominal pain, diarrhea, nausea and vomiting. Genitourinary: Negative for dysuria and urgency. Musculoskeletal: Positive for arthralgias. Negative for back pain. Neurological: Negative for dizziness and headaches. Psychiatric/Behavioral: Negative for dysphoric mood. The patient is not nervous/anxious.       Past Medical History:   Diagnosis Date    Arthritis     Breast cancer ([address]     H/O bladder infections     History of stomach ulcers     Knee cartilage, torn, left     PONV (postoperative nausea and vomiting)     Type II or unspecified type diabetes mellitus without mention of complication, not stated as uncontrolled       Past Surgical History:   Procedure Laterality Date    COLONOSCOPY  2016    DILATION AND CURETTAGE OF UTERUS  04/17/2017    ENDOMETRIAL ABLATION      GASTRIC BAND  07/12/2011 Dr Mandy Chavez, TOTAL ABDOMINAL  12/15/2020    JOINT REPLACEMENT  12    Right total knee      LYMPHADENECTOMY  10/02/2020    MASTECTOMY Left     SALPINGO-OOPHORECTOMY N/A 12/15/2020    ROBOTIC HYSYER WITH BILATERAL SALPING-OOPHORECTOMY performed by Dennise Ng MD at [address] [place] [address]       Family History   Problem Relation Age of Onset    Heart Disease Mother         CHF    Cancer Father     COPD Father     Diabetes Father     High Blood Pressure Father     High Cholesterol Father      Social History     Tobacco Use    Smoking status: Former Smoker     Quit date: 2000     Years since quittin.4    Smokeless tobacco: Never Used   Substance Use Topics    Alcohol use: Yes     Comment: rarely      Current Outpatient Medications   Medication Sig Dispense Refill    cyclobenzaprine (FLEXERIL) 10 MG tablet take 1 tablet by mouth twice a day if needed for muscle spasm 60 tablet 1    ondansetron (ZOFRAN) 4 MG tablet Take 1 tablet by mouth every 8 hours as needed for Nausea 60 tablet 1    vitamin D (CHOLECALCIFEROL) 125 MCG (5000 UT) CAPS capsule Take 1 capsule by mouth daily 90 capsule 3    PARoxetine (PAXIL) 40 MG tablet Take 1 tablet by mouth daily 90 tablet 3    metFORMIN (GLUCOPHAGE-XR) 500 MG extended release tablet Take 2 tablets by mouth 2 times daily take 2 tablets by mouth every morning (Patient taking differently: Take 1,000 mg by mouth every evening take 4 tablets by mouth every morning) 360 tablet 3    loratadine (CLARITIN) 10 MG tablet take 1 tablet by mouth once daily 90 tablet 3    rOPINIRole (REQUIP) 0.5 MG tablet Take 2 tablets by mouth nightly 180 tablet 3    vitamin C (ASCORBIC ACID) 500 MG tablet Take 500 mg by mouth daily      ZINC PO Take 1 tablet by mouth daily      magnesium oxide (MAG-OX) 400 MG tablet Take 400 mg by mouth daily  anastrozole (ARIMIDEX) 1 MG tablet Take 1 tablet by mouth daily 30 tablet 11    Glucosamine-Chondroitin (GLUCOSAMINE CHONDR COMPLEX PO) Take by mouth 2 times daily      vitamin E 400 UNIT capsule Take 400 Units by mouth 2 times daily      albuterol sulfate HFA (PROAIR HFA) 108 (90 Base) MCG/ACT inhaler Inhale 2 puffs into the lungs every 6 hours as needed for Wheezing 1 Inhaler 3    Glucose Blood (BLOOD GLUCOSE TEST STRIPS) STRP Contour EZ test strips and lancets test daily E11.9 50 strip 11    diclofenac sodium (VOLTAREN) 1 % GEL Apply 2 g topically 4 times daily 150 g 5    fluticasone (FLONASE) 50 MCG/ACT nasal spray 1 spray by Nasal route daily (Patient not taking: Reported on 2/11/2021) 3 Bottle 3     No current facility-administered medications for this visit. Allergies   Allergen Reactions    Sulfa Antibiotics Itching    Adhesive Tape Rash and Other (See Comments)     Blisters with prolonged use    Hydrocodone-Acetaminophen Nausea And Vomiting    Vicodin [Hydrocodone-Acetaminophen] Nausea And Vomiting     Health Maintenance   Topic Date Due    Hepatitis C screen  1970    Pneumococcal 0-64 years Vaccine (1 of 1 - PPSV23) 12/22/1974    HIV screen  12/22/1983    Hepatitis B vaccine (1 of 3 - Risk 3-dose series) 12/22/1987    Shingles Vaccine (1 of 2) 12/22/2018    Diabetic microalbuminuria test  07/02/2019    Diabetic foot exam  02/22/2020    Lipid screen  03/26/2020    Diabetic retinal exam  04/04/2020    A1C test (Diabetic or Prediabetic)  01/09/2021    Breast cancer screen  09/01/2021    Colon cancer screen colonoscopy  03/08/2026    DTaP/Tdap/Td vaccine (2 - Td) 09/22/2030    Flu vaccine  Completed    Hepatitis A vaccine  Aged Out    Hib vaccine  Aged Out    Meningococcal (ACWY) vaccine  Aged Out         Objective:     Physical Exam  Constitutional:       General: She is not in acute distress. Appearance: She is well-developed. She is not diaphoretic.    HENT: Head: Normocephalic and atraumatic. Eyes:      General: No scleral icterus. Conjunctiva/sclera: Conjunctivae normal.   Neck:      Thyroid: No thyromegaly. Vascular: No JVD. Comments: No bruits  Cardiovascular:      Rate and Rhythm: Normal rate and regular rhythm. Heart sounds: Normal heart sounds. Pulmonary:      Effort: Pulmonary effort is normal. No respiratory distress. Breath sounds: Normal breath sounds. No wheezing or rales. Abdominal:      Palpations: Abdomen is soft. There is no mass. Tenderness: There is no abdominal tenderness. There is no guarding. Musculoskeletal:         General: No tenderness. Skin:     General: Skin is warm and dry. Findings: No rash. Neurological:      Mental Status: She is alert and oriented to person, place, and time. Cranial Nerves: No cranial nerve deficit. /88   Pulse 102   Temp 96.5 °F (35.8 °C) (Temporal)   Resp 18   Wt 245 lb (111.1 kg)   LMP 11/17/2020 (Approximate) Comment: tubal ligation/ablation  BMI 43.40 kg/m²       Impression/Plan:  1.  Type 2 diabetes mellitus without complication, without long-term current use of insulin ([facility])      Requested Prescriptions     Signed Prescriptions Disp Refills    cyclobenzaprine (FLEXERIL) 10 MG tablet 60 tablet 1     Sig: take 1 tablet by mouth twice a day if needed for muscle spasm    ondansetron (ZOFRAN) 4 MG tablet 60 tablet 1     Sig: Take 1 tablet by mouth every 8 hours as needed for Nausea    vitamin D (CHOLECALCIFEROL) 125 MCG (5000 UT) CAPS capsule 90 capsule 3     Sig: Take 1 capsule by mouth daily    PARoxetine (PAXIL) 40 MG tablet 90 tablet 3     Sig: Take 1 tablet by mouth daily     Orders Placed This Encounter   Procedures    Lipid Panel     Standing Status:   Future     Standing Expiration Date:   2/11/2022     Order Specific Question:   Is Patient Fasting?/# of Hours     Answer:   yes/12    Comprehensive Metabolic Panel Standing Status:   Future     Standing Expiration Date:   2/11/2022    Hemoglobin A1C     Standing Status:   Future     Standing Expiration Date:   2/11/2022    Microalbumin / Creatinine Urine Ratio     Standing Status:   Future     Standing Expiration Date:   2/11/2022   Counseled on diet exercise for blood sugar control. Discussed pain control issues. Patient giveneducational materials - see patient instructions. Discussed use, benefit, and side effects of prescribed medications. All patient questions answered. Pt voiced understanding. Reviewed health maintenance. Patient agreedwith treatment plan. Follow up as directed. **This report has been created using voice recognition software. It may contain minor errorswhich are inherent in voice recognition technology. **       Electronically signed by Rafael Romano MD on 2/20/2021 at 2:17 PM

## 2021-02-23 ENCOUNTER — TELEPHONE (OUTPATIENT)
Dept: CASE MANAGEMENT | Age: 51
End: 2021-02-23

## 2021-03-02 ENCOUNTER — CLINICAL DOCUMENTATION (OUTPATIENT)
Dept: CASE MANAGEMENT | Age: 51
End: 2021-03-02

## 2021-03-02 NOTE — TELEPHONE ENCOUNTER
Name: Rian Estrada  : 1970  MRN: [id number]    Oncology Navigation Follow-Up Note    Contact Type:  Telephone    Notes: Called pt to discuss survivorship care plan. During discussion pt mentioned she is having side effects from Arimidex. States she is having a lot of bone pain and has seen her PCP for the pain, using percocet and glucosamine. Encouraged light yoga exercises, fish oil, and decrease use of percocet if tolerated. Asking if there is anything else she could take rather than Arimidex. Informed her that there are other medications in the same classification that could be tried. Encouraged her to discuss with med onc. States she had originally planned to see Dr. Nathan Pavon but a friend  encouraged her to see Dr. Carlos Miner. States she would prefer to see Dr. Nathan Pavon. Will assist pt as needed. Survivorship care plan will be held and updated after Juliette talks with med onc.     Electronically signed by Mansoor Cannon RN on 21 at 11:04 AM

## 2021-03-15 DIAGNOSIS — G89.29 OTHER CHRONIC PAIN: ICD-10-CM

## 2021-03-15 RX ORDER — OXYCODONE HYDROCHLORIDE AND ACETAMINOPHEN 5; 325 MG/1; MG/1
1 TABLET ORAL EVERY 6 HOURS PRN
Qty: 28 TABLET | Refills: 0 | Status: SHIPPED | OUTPATIENT
Start: 2021-03-15 | End: 2021-05-03 | Stop reason: SDUPTHER

## 2021-04-07 ENCOUNTER — OFFICE VISIT (OUTPATIENT)
Dept: ONCOLOGY | Age: 51
End: 2021-04-07
Payer: COMMERCIAL

## 2021-04-07 ENCOUNTER — HOSPITAL ENCOUNTER (OUTPATIENT)
Dept: INFUSION THERAPY | Age: 51
Discharge: HOME | End: 2021-04-07
Payer: COMMERCIAL

## 2021-04-07 VITALS
HEART RATE: 111 BPM | DIASTOLIC BLOOD PRESSURE: 90 MMHG | SYSTOLIC BLOOD PRESSURE: 133 MMHG | WEIGHT: 242.4 LBS | BODY MASS INDEX: 42.95 KG/M2 | TEMPERATURE: 96.6 F | RESPIRATION RATE: 16 BRPM | HEIGHT: 63 IN | OXYGEN SATURATION: 96 %

## 2021-04-07 DIAGNOSIS — Z51.81 ENCOUNTER FOR MONITORING AROMATASE INHIBITOR THERAPY: ICD-10-CM

## 2021-04-07 DIAGNOSIS — C50.812 MALIGNANT NEOPLASM OF OVERLAPPING SITES OF LEFT BREAST IN FEMALE, ESTROGEN RECEPTOR POSITIVE (HCC): ICD-10-CM

## 2021-04-07 DIAGNOSIS — Z79.811 ENCOUNTER FOR MONITORING AROMATASE INHIBITOR THERAPY: ICD-10-CM

## 2021-04-07 DIAGNOSIS — Z17.0 MALIGNANT NEOPLASM OF OVERLAPPING SITES OF LEFT BREAST IN FEMALE, ESTROGEN RECEPTOR POSITIVE (HCC): ICD-10-CM

## 2021-04-07 DIAGNOSIS — K76.9 LIVER LESION: ICD-10-CM

## 2021-04-07 DIAGNOSIS — Z78.0 POSTMENOPAUSAL: Primary | ICD-10-CM

## 2021-04-07 PROCEDURE — 99211 OFF/OP EST MAY X REQ PHY/QHP: CPT

## 2021-04-07 PROCEDURE — 99214 OFFICE O/P EST MOD 30 MIN: CPT | Performed by: INTERNAL MEDICINE

## 2021-04-07 RX ORDER — CHLORAL HYDRATE 500 MG
CAPSULE ORAL DAILY
COMMUNITY
End: 2021-12-27

## 2021-04-07 NOTE — PROGRESS NOTES
(100%, strong intensity)  Progesterone receptor: positive (60%, strong intensity)  HER2 FISH: negative (ratio 0.8, copy number 1.5) per outside report  pTNM: pT1a pN0(sn)(i-)     She met with Dr. Dakota Santo at [facility] to discuss adjuvant treatment options. The patient received recommendation consider antiestrogen therapy. Her postsurgical course was complicated by healing of her reconstruction will ultimately required placement of wound VAC and skin graft. In  June 2020 placed on aromatase inhibitor. She also developed lymphedema  In December 2020 the patient underwent  hysterectomy with bilateral oophorectomy. Till this point the patient was under care of Dr. Nelly Clarke, however due to him scaling down practicing [facility] she would like to transfer care to me. Patient continues Arimidex however she complains of diffuse arthralgia    Family History   Problem Relation Age of Onset    Heart Disease Mother         CHF    Cancer Father     COPD Father     Diabetes Father     High Blood Pressure Father     High Cholesterol Father        ROS:  Constitutional: Positive for fatigue. Negative for activity change, appetite change and fever. HENT: Negative for congestion, dental problem, facial swelling, hearing loss, mouth sores, nosebleeds, sore throat, tinnitus and trouble swallowing. Eyes: Negative for discharge and visual disturbance. Respiratory: Negative for cough, chest tightness, shortness of breath and wheezing. Cardiovascular: Negative for chest pain, palpitations and leg swelling. Gastrointestinal: Negative for abdominal distention, abdominal pain, blood in stool, constipation, diarrhea, nausea, rectal pain and vomiting. Endocrine: Negative for cold intolerance, polydipsia and polyuria. Genitourinary: Negative for decreased urine volume, difficulty urinating, dysuria, flank pain, hematuria and urgency.    Musculoskeletal: Negative for arthralgias, back pain, gait problem, joint swelling, myalgias and neck stiffness. Skin:  Negative for color change and wound. Neurological: Negative for dizziness, tremors, seizures, speech difficulty, weakness, light-headedness and numbness. Hematological: Negative for adenopathy. Does not bruise/bleed easily. Psychiatric/Behavioral: Negative for confusion and sleep disturbance. The patient is not nervous/anxious. Objective:   Physical Exam  Vitals:    04/07/21 1413   BP: (!) 133/90   Pulse: 111   Resp: 16   Temp: 96.6 °F (35.9 °C)   SpO2: 96%       Constitutional: She is oriented to person, place, and time. She appears well-developed and well-nourished. No distress. HENT:   Head: Normocephalic. Mouth/Throat: Oropharynx is clear and moist. No oropharyngeal exudate. Eyes: Pupils are equal, round, and reactive to light. EOM are normal. Right eye exhibits no discharge. Left eye exhibits no discharge. No scleral icterus. Neck: Normal range of motion. Neck supple. No JVD present. No tracheal deviation present. No thyromegaly present. Cardiovascular: Normal rate and normal heart sounds. Exam reveals no gallop and no friction rub. No murmur heard. Pulmonary/Chest: Effort normal and breath sounds normal. No stridor. No respiratory distress. She has no wheezes. She has no rales. She exhibits no tenderness. Abdominal: Soft. Bowel sounds are normal. She exhibits no distension and no mass. There is no tenderness. There is no rebound. Musculoskeletal: Normal range of motion. She exhibits no edema. Good range of motion in all four extremities. Lymphadenopathy:     She has no cervical adenopathy. Neurological: She is alert and oriented to person, place, and time. She has normal reflexes. No cranial nerve deficit. She exhibits normal muscle tone. Skin: Skin is warm. No rash noted. No erythema. Psychiatric: She has a normal mood and affect.  Her behavior is normal. Judgment and thought content normal.     CT angio abdomen and pelvis in October 2019 showed:  IMPRESSION:   1.  Patent deep inferior epigastric arteries. 2.  Hepatomegaly and steatosis. Arterially enhancing lesion in the right  hepatic dome favors a hemangioma, but is indeterminate. Correlation with prior  outside imaging if available, otherwise could evaluate with dedicated CT or MR  liver protocol. 3.  Borderline splenomegaly. Assessment/Plan:     1. Left breast cancer stage Ia. The patient was diagnosed with breast cancer before age of 46, we are making referral to genetic clinic. No evidence of disease recurrence on today's physical examination. 2.  Therapy with aromatase inhibitor. Due to side effects the patient was instructed to stop taking Arimidex and give us update in 3 weeks. Order is also placed for bone DEXA density study. 3.  Indeterminate findings on the CT of the abdomen. CT of the abdomen in 2019 showed hepatomegaly and steatosis. There was also hepatic lesion favors hemangioma but indeterminate. Recommendation was to proceed with follow-up study.   Order is placed for CT

## 2021-04-07 NOTE — PATIENT INSTRUCTIONS
1.  She was instructed to stop the Arimidex for 3 weeks and call us with update  2. DEXA study next to 1-2 weeks  3. Genetic counseling referral  4. CT of the abdomen next week  5.   RTC to see me in 4 months

## 2021-04-16 ENCOUNTER — HOSPITAL ENCOUNTER (OUTPATIENT)
Dept: WOMENS IMAGING | Age: 51
Discharge: HOME | End: 2021-04-16
Payer: COMMERCIAL

## 2021-04-16 ENCOUNTER — HOSPITAL ENCOUNTER (OUTPATIENT)
Dept: CT IMAGING | Age: 51
Discharge: HOME | End: 2021-04-16
Payer: COMMERCIAL

## 2021-04-16 DIAGNOSIS — K76.9 LIVER LESION: ICD-10-CM

## 2021-04-16 DIAGNOSIS — Z78.0 POSTMENOPAUSAL: ICD-10-CM

## 2021-04-16 LAB — POC CREATININE WHOLE BLOOD: 0.6 MG/DL (ref 0.5–1.2)

## 2021-04-16 PROCEDURE — 6360000004 HC RX CONTRAST MEDICATION: Performed by: INTERNAL MEDICINE

## 2021-04-16 PROCEDURE — 74177 CT ABD & PELVIS W/CONTRAST: CPT

## 2021-04-16 PROCEDURE — 77080 DXA BONE DENSITY AXIAL: CPT

## 2021-04-16 PROCEDURE — 82565 ASSAY OF CREATININE: CPT

## 2021-04-16 RX ADMIN — IOPAMIDOL 85 ML: 755 INJECTION, SOLUTION INTRAVENOUS at 09:43

## 2021-04-30 ENCOUNTER — PATIENT MESSAGE (OUTPATIENT)
Dept: FAMILY MEDICINE CLINIC | Age: 51
End: 2021-04-30

## 2021-04-30 DIAGNOSIS — G89.29 OTHER CHRONIC PAIN: ICD-10-CM

## 2021-05-03 RX ORDER — OXYCODONE HYDROCHLORIDE AND ACETAMINOPHEN 5; 325 MG/1; MG/1
1 TABLET ORAL EVERY 6 HOURS PRN
Qty: 28 TABLET | Refills: 0 | Status: SHIPPED | OUTPATIENT
Start: 2021-05-03 | End: 2021-06-01 | Stop reason: SDUPTHER

## 2021-05-03 NOTE — TELEPHONE ENCOUNTER
From: Yris Covington  To: Shruthi Campbell MD  Sent: 4/30/2021 6:53 PM EDT  Subject: Prescription Question    Hello, would it be possible to get a refill on my Percocet before I leave town next Wednesday? Been feeling a little better since Dr. Vega Collins let me take a break from the arimidex. May be trying a new drug to see if it causes me less pain.   Thanks,   Rosie Turcios

## 2021-05-10 ENCOUNTER — TELEPHONE (OUTPATIENT)
Dept: ONCOLOGY | Age: 51
End: 2021-05-10

## 2021-05-10 NOTE — TELEPHONE ENCOUNTER
Patient says she has been feeling much better since stopping Arimidex and hasn't heard back if she should start taking it again or not. Please advise.

## 2021-05-12 NOTE — TELEPHONE ENCOUNTER
Dr. Turner Nelson talked to the patient. She has been off Arimidex for 1 months.   The patient was instructed to take Arimidex 1 tablet every other day

## 2021-05-13 ENCOUNTER — OFFICE VISIT (OUTPATIENT)
Dept: FAMILY MEDICINE CLINIC | Age: 51
End: 2021-05-13
Payer: COMMERCIAL

## 2021-05-13 VITALS
DIASTOLIC BLOOD PRESSURE: 78 MMHG | HEIGHT: 64 IN | TEMPERATURE: 97.9 F | WEIGHT: 239.8 LBS | BODY MASS INDEX: 40.94 KG/M2 | HEART RATE: 113 BPM | SYSTOLIC BLOOD PRESSURE: 124 MMHG | OXYGEN SATURATION: 96 %

## 2021-05-13 DIAGNOSIS — C50.919 MALIGNANT NEOPLASM OF FEMALE BREAST, UNSPECIFIED ESTROGEN RECEPTOR STATUS, UNSPECIFIED LATERALITY, UNSPECIFIED SITE OF BREAST (HCC): ICD-10-CM

## 2021-05-13 DIAGNOSIS — E11.9 TYPE 2 DIABETES MELLITUS WITHOUT COMPLICATION, WITHOUT LONG-TERM CURRENT USE OF INSULIN (HCC): Primary | ICD-10-CM

## 2021-05-13 DIAGNOSIS — M54.16 LUMBAR RADICULOPATHY: ICD-10-CM

## 2021-05-13 PROCEDURE — 96372 THER/PROPH/DIAG INJ SC/IM: CPT | Performed by: FAMILY MEDICINE

## 2021-05-13 PROCEDURE — 99213 OFFICE O/P EST LOW 20 MIN: CPT | Performed by: FAMILY MEDICINE

## 2021-05-13 RX ORDER — METHYLPREDNISOLONE ACETATE 80 MG/ML
160 INJECTION, SUSPENSION INTRA-ARTICULAR; INTRALESIONAL; INTRAMUSCULAR; SOFT TISSUE ONCE
Status: COMPLETED | OUTPATIENT
Start: 2021-05-13 | End: 2021-05-13

## 2021-05-13 RX ADMIN — METHYLPREDNISOLONE ACETATE 160 MG: 80 INJECTION, SUSPENSION INTRA-ARTICULAR; INTRALESIONAL; INTRAMUSCULAR; SOFT TISSUE at 14:23

## 2021-05-13 NOTE — PROGRESS NOTES
Administrations This Visit     methylPREDNISolone acetate (DEPO-MEDROL) injection 160 mg     Admin Date  05/13/2021  14:23 Action  Given Dose  160 mg Route  Intramuscular Site  Dorsogluteal Left Administered By  Leslye Graham LPN    Ordering Provider: Felipe Clarke MD    NDC: 4527-0829-99    Lot#: [id number]    : [employer]    Patient Supplied?: No

## 2021-05-16 ASSESSMENT — ENCOUNTER SYMPTOMS
SORE THROAT: 0
COUGH: 0
RHINORRHEA: 0
CHEST TIGHTNESS: 0
SHORTNESS OF BREATH: 0
DIARRHEA: 0
EYES NEGATIVE: 1
ABDOMINAL PAIN: 0
NAUSEA: 0
VOMITING: 0
BACK PAIN: 1

## 2021-05-17 NOTE — PROGRESS NOTES
[address] [place] [facility] [address]  Dept: [phone]  Dept Fax: [phone]  Loc: [phone]  PROGRESS NOTE      VisitDate: 5/13/2021    Thee Peterson is a 44 y.o. female who presents today for:     Chief Complaint   Patient presents with    3 Month Follow-Up     bone pain, diabetes. Did not do recent lab work. Subjective:  HPI  Patient comes in follow-up diabetes, blood sugar stable doing well. History of breast cancer. Is having low back pain flareup. Has been a chronic issue gets radicular symptoms. Labs not completed. Review of Systems   Constitutional: Negative for activity change, appetite change, fatigue and fever. HENT: Negative for congestion, rhinorrhea and sore throat. Eyes: Negative. Respiratory: Negative for cough, chest tightness and shortness of breath. Cardiovascular: Negative for chest pain and palpitations. Gastrointestinal: Negative for abdominal pain, diarrhea, nausea and vomiting. Genitourinary: Negative for dysuria and urgency. Musculoskeletal: Positive for back pain. Negative for arthralgias. Neurological: Negative for dizziness and headaches. Psychiatric/Behavioral: Negative for dysphoric mood. The patient is not nervous/anxious.       Past Medical History:   Diagnosis Date    Arthritis     Breast cancer ([address]     H/O bladder infections     History of stomach ulcers     Knee cartilage, torn, left     PONV (postoperative nausea and vomiting)     Type II or unspecified type diabetes mellitus without mention of complication, not stated as uncontrolled       Past Surgical History:   Procedure Laterality Date    COLONOSCOPY  2016    DILATION AND CURETTAGE OF UTERUS  04/17/2017    ENDOMETRIAL ABLATION      GASTRIC BAND  07/12/2011    Dr Davy Castro, TOTAL ABDOMINAL  12/15/2020    JOINT REPLACEMENT  5/30/12    Right total knee     LYMPHADENECTOMY  10/02/2020    MASTECTOMY Left     SALPINGO-OOPHORECTOMY N/A 12/15/2020    ROBOTIC HYSYER WITH BILATERAL SALPING-OOPHORECTOMY performed by Ronald Michel MD at [address] [place] [address]       Family History   Problem Relation Age of Onset    Heart Disease Mother         CHF    Cancer Father     COPD Father     Diabetes Father     High Blood Pressure Father     High Cholesterol Father      Social History     Tobacco Use    Smoking status: Former Smoker     Quit date: 2000     Years since quittin.7    Smokeless tobacco: Never Used   Substance Use Topics    Alcohol use: Yes     Comment: rarely      Current Outpatient Medications   Medication Sig Dispense Refill    Omega-3 1000 MG CAPS Take by mouth daily      diclofenac sodium (VOLTAREN) 1 % GEL Apply 2 g topically 4 times daily 150 g 5    cyclobenzaprine (FLEXERIL) 10 MG tablet take 1 tablet by mouth twice a day if needed for muscle spasm 60 tablet 1    ondansetron (ZOFRAN) 4 MG tablet Take 1 tablet by mouth every 8 hours as needed for Nausea 60 tablet 1    vitamin D (CHOLECALCIFEROL) 125 MCG (5000 UT) CAPS capsule Take 1 capsule by mouth daily 90 capsule 3    PARoxetine (PAXIL) 40 MG tablet Take 1 tablet by mouth daily 90 tablet 3    metFORMIN (GLUCOPHAGE-XR) 500 MG extended release tablet Take 2 tablets by mouth 2 times daily take 2 tablets by mouth every morning (Patient taking differently: Take 1,000 mg by mouth every evening take 4 tablets by mouth every morning) 360 tablet 3    loratadine (CLARITIN) 10 MG tablet take 1 tablet by mouth once daily 90 tablet 3    rOPINIRole (REQUIP) 0.5 MG tablet Take 2 tablets by mouth nightly 180 tablet 3    fluticasone (FLONASE) 50 MCG/ACT nasal spray 1 spray by Nasal route daily 3 Bottle 3    vitamin C (ASCORBIC ACID) 500 MG tablet Take 500 mg by mouth daily      ZINC PO Take 1 tablet by mouth daily      magnesium oxide (MAG-OX) 400 MG tablet Take 400 mg by mouth daily      anastrozole (ARIMIDEX) 1 MG tablet Take 1 tablet by mouth daily 30 tablet 11    Glucosamine-Chondroitin (GLUCOSAMINE CHONDR COMPLEX PO) Take by mouth daily       vitamin E 400 UNIT capsule Take 400 Units by mouth 2 times daily      albuterol sulfate HFA (PROAIR HFA) 108 (90 Base) MCG/ACT inhaler Inhale 2 puffs into the lungs every 6 hours as needed for Wheezing 1 Inhaler 3    Glucose Blood (BLOOD GLUCOSE TEST STRIPS) STRP Contour EZ test strips and lancets test daily E11.9 50 strip 11     No current facility-administered medications for this visit. Allergies   Allergen Reactions    Sulfa Antibiotics Itching    Adhesive Tape Rash and Other (See Comments)     Blisters with prolonged use    Hydrocodone-Acetaminophen Nausea And Vomiting    Vicodin [Hydrocodone-Acetaminophen] Nausea And Vomiting     Health Maintenance   Topic Date Due    Hepatitis C screen  Never done    Pneumococcal 0-64 years Vaccine (1 of 2 - PPSV23) Never done    HIV screen  Never done    Hepatitis B vaccine (1 of 3 - Risk 3-dose series) Never done    Shingles Vaccine (1 of 2) Never done    Diabetic microalbuminuria test  07/02/2019    Diabetic foot exam  02/22/2020    Lipid screen  03/26/2020    A1C test (Diabetic or Prediabetic)  01/09/2021    Breast cancer screen  09/01/2021    Diabetic retinal exam  04/23/2022    Colon cancer screen colonoscopy  03/08/2026    DTaP/Tdap/Td vaccine (2 - Td) 09/22/2030    Flu vaccine  Completed    COVID-19 Vaccine  Completed    Hepatitis A vaccine  Aged Out    Hib vaccine  Aged Out    Meningococcal (ACWY) vaccine  Aged Out         Objective:     Physical Exam  Constitutional:       General: She is not in acute distress. Appearance: She is well-developed. She is not diaphoretic. HENT:      Head: Normocephalic and atraumatic. Eyes:      General: No scleral icterus. Conjunctiva/sclera: Conjunctivae normal.   Neck:      Thyroid: No thyromegaly. Vascular: No JVD. Comments: No bruits  Cardiovascular:      Rate and Rhythm: Normal rate and regular rhythm. Heart sounds: Normal heart sounds. Pulmonary:      Effort: Pulmonary effort is normal. No respiratory distress. Breath sounds: Normal breath sounds. No wheezing or rales. Abdominal:      Palpations: Abdomen is soft. There is no mass. Tenderness: There is no abdominal tenderness. There is no guarding. Musculoskeletal:         General: Tenderness present. Skin:     General: Skin is warm and dry. Findings: No rash. Neurological:      Mental Status: She is alert and oriented to person, place, and time. Cranial Nerves: No cranial nerve deficit. /78   Pulse 113   Temp 97.9 °F (36.6 °C) (Oral)   Ht 5' 3.5\" (1.613 m)   Wt 239 lb 12.8 oz (108.8 kg)   LMP 11/17/2020 (Approximate) Comment: tubal ligation/ablation  SpO2 96%   BMI 41.81 kg/m²       Impression/Plan:  1. Type 2 diabetes mellitus without complication, without long-term current use of insulin ([facility]    2. Malignant neoplasm of female breast, unspecified estrogen receptor status, unspecified laterality, unspecified site of breast ([facility] [address]    3.  Lumbar radiculopathy      Requested Prescriptions      No prescriptions requested or ordered in this encounter     Orders Placed This Encounter   Procedures    Lipid Panel     Standing Status:   Future     Standing Expiration Date:   5/13/2022     Order Specific Question:   Is Patient Fasting?/# of Hours     Answer:   yes/12    Comprehensive Metabolic Panel     Standing Status:   Future     Standing Expiration Date:   5/13/2022    CBC Auto Differential     Standing Status:   Future     Standing Expiration Date:   5/13/2022    Microalbumin / Creatinine Urine Ratio     Standing Status:   Future     Standing Expiration Date:   5/13/2022    Hemoglobin A1C     Standing Status:   Future     Standing Expiration Date:   5/13/2022   Methylprednisolone 160 IM    Patient giveneducational materials - see patient instructions. Discussed use, benefit, and side effects of prescribed medications. All patient questions answered. Pt voiced understanding. Reviewed health maintenance. Patient agreedwith treatment plan. Follow up as directed. **This report has been created using voice recognition software. It may contain minor errorswhich are inherent in voice recognition technology. **       Electronically signed by Tan Alfaro MD on 5/16/2021 at 8:42 PM

## 2021-06-30 DIAGNOSIS — G47.00 INSOMNIA, UNSPECIFIED TYPE: ICD-10-CM

## 2021-07-01 RX ORDER — ALPRAZOLAM 0.5 MG/1
TABLET ORAL
Qty: 90 TABLET | Refills: 0 | Status: SHIPPED | OUTPATIENT
Start: 2021-07-01 | End: 2021-07-29

## 2021-07-02 ENCOUNTER — TELEPHONE (OUTPATIENT)
Dept: FAMILY MEDICINE CLINIC | Age: 51
End: 2021-07-02

## 2021-07-02 RX ORDER — CEFUROXIME AXETIL 250 MG/1
250 TABLET ORAL 2 TIMES DAILY
Qty: 20 TABLET | Refills: 0 | Status: SHIPPED | OUTPATIENT
Start: 2021-07-02 | End: 2021-07-12

## 2021-07-02 RX ORDER — BENZONATATE 200 MG/1
200 CAPSULE ORAL 3 TIMES DAILY PRN
Qty: 30 CAPSULE | Refills: 0 | Status: SHIPPED | OUTPATIENT
Start: 2021-07-02 | End: 2021-07-09

## 2021-07-02 RX ORDER — FLUCONAZOLE 100 MG/1
100 TABLET ORAL DAILY
Qty: 10 TABLET | Refills: 0 | Status: SHIPPED | OUTPATIENT
Start: 2021-07-02 | End: 2021-07-09

## 2021-07-02 NOTE — TELEPHONE ENCOUNTER
She has ST, coughing, nasal congestion, headache. X  2 days. She is going OOT and asking if she can be treated, OTC products not helping.      [facility]  [facility]

## 2021-07-06 RX ORDER — ANASTROZOLE 1 MG/1
1 TABLET ORAL DAILY
Qty: 30 TABLET | Refills: 11 | Status: SHIPPED | OUTPATIENT
Start: 2021-07-06 | End: 2021-08-18 | Stop reason: SDUPTHER

## 2021-07-26 DIAGNOSIS — G47.00 INSOMNIA, UNSPECIFIED TYPE: ICD-10-CM

## 2021-07-30 RX ORDER — ALPRAZOLAM 0.5 MG/1
TABLET ORAL
Qty: 90 TABLET | Refills: 0 | Status: SHIPPED | OUTPATIENT
Start: 2021-07-30 | End: 2021-12-08

## 2021-08-02 DIAGNOSIS — G89.29 OTHER CHRONIC PAIN: ICD-10-CM

## 2021-08-02 RX ORDER — OXYCODONE HYDROCHLORIDE AND ACETAMINOPHEN 5; 325 MG/1; MG/1
1 TABLET ORAL EVERY 6 HOURS PRN
Qty: 28 TABLET | Refills: 0 | Status: SHIPPED | OUTPATIENT
Start: 2021-08-02 | End: 2021-09-03 | Stop reason: SDUPTHER

## 2021-08-18 ENCOUNTER — OFFICE VISIT (OUTPATIENT)
Dept: ONCOLOGY | Age: 51
End: 2021-08-18
Payer: COMMERCIAL

## 2021-08-18 ENCOUNTER — HOSPITAL ENCOUNTER (OUTPATIENT)
Dept: INFUSION THERAPY | Age: 51
Discharge: HOME | End: 2021-08-18
Payer: COMMERCIAL

## 2021-08-18 VITALS
HEART RATE: 110 BPM | DIASTOLIC BLOOD PRESSURE: 89 MMHG | RESPIRATION RATE: 16 BRPM | SYSTOLIC BLOOD PRESSURE: 125 MMHG | WEIGHT: 228 LBS | BODY MASS INDEX: 40.4 KG/M2 | HEIGHT: 63 IN | OXYGEN SATURATION: 96 %

## 2021-08-18 DIAGNOSIS — C50.812 MALIGNANT NEOPLASM OF OVERLAPPING SITES OF LEFT BREAST IN FEMALE, ESTROGEN RECEPTOR POSITIVE (HCC): ICD-10-CM

## 2021-08-18 DIAGNOSIS — Z17.0 MALIGNANT NEOPLASM OF OVERLAPPING SITES OF LEFT BREAST IN FEMALE, ESTROGEN RECEPTOR POSITIVE (HCC): ICD-10-CM

## 2021-08-18 DIAGNOSIS — Z79.811 ENCOUNTER FOR MONITORING AROMATASE INHIBITOR THERAPY: ICD-10-CM

## 2021-08-18 DIAGNOSIS — Z51.81 ENCOUNTER FOR MONITORING AROMATASE INHIBITOR THERAPY: ICD-10-CM

## 2021-08-18 DIAGNOSIS — Z78.0 POSTMENOPAUSAL: Primary | ICD-10-CM

## 2021-08-18 PROCEDURE — 99214 OFFICE O/P EST MOD 30 MIN: CPT | Performed by: INTERNAL MEDICINE

## 2021-08-18 PROCEDURE — 99211 OFF/OP EST MAY X REQ PHY/QHP: CPT

## 2021-08-18 RX ORDER — TURMERIC 400 MG
CAPSULE ORAL
COMMUNITY
End: 2021-12-27

## 2021-08-18 RX ORDER — ANASTROZOLE 1 MG/1
1 TABLET ORAL DAILY
Qty: 30 TABLET | Refills: 11 | Status: SHIPPED | OUTPATIENT
Start: 2021-08-18 | End: 2021-11-02 | Stop reason: ALTCHOICE

## 2021-08-18 NOTE — PROGRESS NOTES
(100%, strong intensity)  Progesterone receptor: positive (60%, strong intensity)  HER2 FISH: negative (ratio 0.8, copy number 1.5) per outside report  pTNM: pT1a pN0(sn)(i-)     She met with Dr. Sara Zavala at [place] to discuss adjuvant treatment options. The patient received recommendation consider antiestrogen therapy. Her postsurgical course was complicated by healing of her reconstruction will ultimately required placement of wound VAC and skin graft. In  June 2020 placed on aromatase inhibitor. She also developed lymphedema  In December 2020 the patient underwent  hysterectomy with bilateral oophorectomy. Till this point the patient was under care of Dr. Oniel Doan, however due to him scaling down practicing [facility] she would like to transfer care to me. Interim history on August 18, 2021:  The patient presents to medical oncology clinic for 3 months follow-up visit. Patient continues Arimidex, however she takes it every other day. Her diffuse arthralgia is much better. Patient is due to have annual mammogram in September 2021. She will have this at [facility] while seeing Dr. Lord Pryor    Family History   Problem Relation Age of Onset    Heart Disease Mother         CHF    Cancer Father     COPD Father     Diabetes Father     High Blood Pressure Father     High Cholesterol Father        ROS:  Constitutional: Positive for fatigue. Negative for activity change, appetite change and fever. HENT: Negative for congestion, dental problem, facial swelling, hearing loss, mouth sores, nosebleeds, sore throat, tinnitus and trouble swallowing. Eyes: Negative for discharge and visual disturbance. Respiratory: Negative for cough, chest tightness, shortness of breath and wheezing. Cardiovascular: Negative for chest pain, palpitations and leg swelling. Gastrointestinal: Negative for abdominal distention, abdominal pain, blood in stool, constipation, diarrhea, nausea, rectal pain and vomiting. Endocrine: Negative for cold intolerance, polydipsia and polyuria. Genitourinary: Negative for decreased urine volume, difficulty urinating, dysuria, flank pain, hematuria and urgency. Musculoskeletal: Negative for arthralgias, back pain, gait problem, joint swelling, myalgias and neck stiffness. Skin:  Negative for color change and wound. Neurological: Negative for dizziness, tremors, seizures, speech difficulty, weakness, light-headedness and numbness. Hematological: Negative for adenopathy. Does not bruise/bleed easily. Psychiatric/Behavioral: Negative for confusion and sleep disturbance. The patient is not nervous/anxious. Objective:   Physical Exam  Vitals:    08/18/21 1547   BP: 125/89   Pulse: 110   Resp: 16   SpO2: 96%       Constitutional: She is oriented to person, place, and time. She appears well-developed and well-nourished. No distress. HENT:   Head: Normocephalic. Mouth/Throat: Oropharynx is clear and moist. No oropharyngeal exudate. Eyes: Pupils are equal, round, and reactive to light. EOM are normal. Right eye exhibits no discharge. Left eye exhibits no discharge. No scleral icterus. Neck: Normal range of motion. Neck supple. No JVD present. No tracheal deviation present. No thyromegaly present. Cardiovascular: Normal rate and normal heart sounds. Exam reveals no gallop and no friction rub. No murmur heard. Pulmonary/Chest: Effort normal and breath sounds normal. No stridor. No respiratory distress. She has no wheezes. She has no rales. She exhibits no tenderness. Abdominal: Soft. Bowel sounds are normal. She exhibits no distension and no mass. There is no tenderness. There is no rebound. Musculoskeletal: Normal range of motion. She exhibits no edema. Good range of motion in all four extremities. Lymphadenopathy:     She has no cervical adenopathy. Neurological: She is alert and oriented to person, place, and time. She has normal reflexes.  No cranial nerve deficit. She exhibits normal muscle tone. Skin: Skin is warm. No rash noted. No erythema. Psychiatric: She has a normal mood and affect. Her behavior is normal. Judgment and thought content normal.     CT angio abdomen and pelvis in October 2019 showed:  IMPRESSION:   1.  Patent deep inferior epigastric arteries. 2.  Hepatomegaly and steatosis. Arterially enhancing lesion in the right  hepatic dome favors a hemangioma, but is indeterminate. Correlation with prior  outside imaging if available, otherwise could evaluate with dedicated CT or MR  liver protocol. 3.  Borderline splenomegaly. Assessment/Plan:     1. Left breast cancer stage Ia. Status post left mastectomy in October 2019. The patient was diagnosed with breast cancer before age of 46, she should have genetic counseling. No evidence of disease recurrence on today's physical examination. 2.  Therapy with aromatase inhibitor. Due to side effects the patient was instructed to stop taking Arimidex, after break she restarted Arimidex 1 tablet every other day. She tolerates it much better. Minimal arthralgia. The patient was instructed to try to go back on taking Arimidex every day. She had a DEXA study in April 2021 that showed osteopenia. We discussed lifestyle measures include adequate calcium and vitamin D, exercise, smoking cessation, fall prevention, and avoidance of heavy alcohol use. In general, 1200 mg of elemental calcium daily, total diet plus supplement, and 800 international units of vitamin D daily are recommended. 3.  Indeterminate findings on the CT of the abdomen. CT of the abdomen in 2019 showed hepatomegaly and steatosis. There was also hepatic lesion favors hemangioma but indeterminate. We repeated CT of the abdomen in April 2021 findings were consistent with hemangioma.   She does have hepatomegaly and diffuse hepatic steatosis

## 2021-08-27 DIAGNOSIS — G47.00 INSOMNIA, UNSPECIFIED TYPE: ICD-10-CM

## 2021-08-27 RX ORDER — ALPRAZOLAM 0.5 MG/1
TABLET ORAL
Qty: 90 TABLET | OUTPATIENT
Start: 2021-08-27

## 2021-08-27 NOTE — TELEPHONE ENCOUNTER
Last Rx 7/30/21 for #90  Last seen 5/13/21  Rx refused, due for an appt. Left a message on pt's machine informing her of this.

## 2021-09-03 ENCOUNTER — VIRTUAL VISIT (OUTPATIENT)
Dept: FAMILY MEDICINE CLINIC | Age: 51
End: 2021-09-03
Payer: COMMERCIAL

## 2021-09-03 DIAGNOSIS — G47.00 INSOMNIA, UNSPECIFIED TYPE: ICD-10-CM

## 2021-09-03 DIAGNOSIS — R11.0 NAUSEA: ICD-10-CM

## 2021-09-03 DIAGNOSIS — L40.4 GUTTATE PSORIASIS: ICD-10-CM

## 2021-09-03 DIAGNOSIS — G89.29 OTHER CHRONIC PAIN: ICD-10-CM

## 2021-09-03 DIAGNOSIS — F41.9 ANXIETY: ICD-10-CM

## 2021-09-03 DIAGNOSIS — C50.812 MALIGNANT NEOPLASM OF OVERLAPPING SITES OF LEFT BREAST IN FEMALE, ESTROGEN RECEPTOR POSITIVE (HCC): Primary | ICD-10-CM

## 2021-09-03 DIAGNOSIS — Z17.0 MALIGNANT NEOPLASM OF OVERLAPPING SITES OF LEFT BREAST IN FEMALE, ESTROGEN RECEPTOR POSITIVE (HCC): Primary | ICD-10-CM

## 2021-09-03 DIAGNOSIS — E11.9 TYPE 2 DIABETES MELLITUS WITHOUT COMPLICATION, WITHOUT LONG-TERM CURRENT USE OF INSULIN (HCC): Chronic | ICD-10-CM

## 2021-09-03 PROCEDURE — 99213 OFFICE O/P EST LOW 20 MIN: CPT | Performed by: FAMILY MEDICINE

## 2021-09-03 RX ORDER — OXYCODONE HYDROCHLORIDE AND ACETAMINOPHEN 5; 325 MG/1; MG/1
1 TABLET ORAL EVERY 6 HOURS PRN
Qty: 28 TABLET | Refills: 0 | Status: SHIPPED | OUTPATIENT
Start: 2021-09-03 | End: 2021-10-01 | Stop reason: SDUPTHER

## 2021-09-03 RX ORDER — PROMETHAZINE HYDROCHLORIDE 25 MG/1
25 TABLET ORAL EVERY 8 HOURS PRN
Qty: 60 TABLET | Refills: 0 | Status: SHIPPED | OUTPATIENT
Start: 2021-09-03

## 2021-09-03 RX ORDER — MOMETASONE FUROATE 1 MG/G
CREAM TOPICAL
Qty: 45 G | Refills: 2 | Status: SHIPPED | OUTPATIENT
Start: 2021-09-03 | End: 2022-09-22 | Stop reason: SDUPTHER

## 2021-09-03 ASSESSMENT — ENCOUNTER SYMPTOMS
RHINORRHEA: 0
NAUSEA: 0
SORE THROAT: 0
DIARRHEA: 0
VOMITING: 0
CHEST TIGHTNESS: 0
COUGH: 0
BACK PAIN: 0
EYES NEGATIVE: 1
ABDOMINAL PAIN: 0
SHORTNESS OF BREATH: 0

## 2021-09-03 NOTE — PROGRESS NOTES
9/3/2021    TELEHEALTH EVALUATION -- Audio/Visual (During [id number] public health emergency)    HPI:    Monika Davies (:  1970) has requested an audio/video evaluation for the following concern(s):    Fu pain, anxiety. Doing ok with current rx. Controlling sx well. Needs cream for psoriasis. Review of Systems   Constitutional: Negative for activity change, appetite change, fatigue and fever. HENT: Negative for congestion, rhinorrhea and sore throat. Eyes: Negative. Respiratory: Negative for cough, chest tightness and shortness of breath. Cardiovascular: Negative for chest pain and palpitations. Gastrointestinal: Negative for abdominal pain, diarrhea, nausea and vomiting. Genitourinary: Negative for dysuria and urgency. Musculoskeletal: Negative for arthralgias and back pain. Neurological: Negative for dizziness and headaches. Psychiatric/Behavioral: Negative for dysphoric mood. The patient is not nervous/anxious. Prior to Visit Medications    Medication Sig Taking? Authorizing Provider   oxyCODONE-acetaminophen (PERCOCET) 5-325 MG per tablet Take 1 tablet by mouth every 6 hours as needed for Pain for up to 7 days. Intended supply: 7 days. Take lowest dose possible to manage pain Yes Emilie Archibald MD   promethazine (PHENERGAN) 25 MG tablet Take 1 tablet by mouth every 8 hours as needed for Nausea Yes Emilie Archibald MD   mometasone (ELOCON) 0.1 % cream Apply topically daily.  Yes Emilie Archibald MD   Turmeric 400 MG CAPS Take by mouth  Historical Provider, MD   anastrozole (ARIMIDEX) 1 MG tablet Take 1 tablet by mouth daily  Shanna Hoffman MD   Loda-3 1000 MG CAPS Take by mouth daily  Historical Provider, MD   diclofenac sodium (VOLTAREN) 1 % GEL Apply 2 g topically 4 times daily  Colin Leija, APRN - CNP   cyclobenzaprine (FLEXERIL) 10 MG tablet take 1 tablet by mouth twice a day if needed for muscle spasm  Emilie Archibald MD   ondansetron (ZOFRAN) 4 MG tablet Take 1 tablet by mouth every 8 hours as needed for Nausea  Nadia Briggs MD   vitamin D (CHOLECALCIFEROL) 125 MCG (5000 UT) CAPS capsule Take 1 capsule by mouth daily  Nadia Briggs MD   PARoxetine (PAXIL) 40 MG tablet Take 1 tablet by mouth daily  Nadia Briggs MD   metFORMIN (GLUCOPHAGE-XR) 500 MG extended release tablet Take 2 tablets by mouth 2 times daily take 2 tablets by mouth every morning  Patient taking differently: Take 1,000 mg by mouth every evening take 4 tablets by mouth every morning  JERI Lopez CNP   loratadine (CLARITIN) 10 MG tablet take 1 tablet by mouth once daily  JERI Lopez CNP   rOPINIRole (REQUIP) 0.5 MG tablet Take 2 tablets by mouth nightly  JERI Lopez CNP   fluticasone (FLONASE) 50 MCG/ACT nasal spray 1 spray by Nasal route daily  JERI Lopez CNP   vitamin C (ASCORBIC ACID) 500 MG tablet Take 500 mg by mouth daily  Historical Provider, MD   ZINC PO Take 1 tablet by mouth daily  Historical Provider, MD   magnesium oxide (MAG-OX) 400 MG tablet Take 400 mg by mouth daily  Historical Provider, MD   Glucosamine-Chondroitin (GLUCOSAMINE CHONDR COMPLEX PO) Take by mouth daily   Historical Provider, MD   vitamin E 400 UNIT capsule Take 400 Units by mouth 2 times daily  Historical Provider, MD   albuterol sulfate HFA (PROAIR HFA) 108 (90 Base) MCG/ACT inhaler Inhale 2 puffs into the lungs every 6 hours as needed for Wheezing  JERI Lopez CNP   Glucose Blood (BLOOD GLUCOSE TEST STRIPS) STRP Contour EZ test strips and lancets test daily E11.9  JERI Guy CNP       Social History     Tobacco Use    Smoking status: Former Smoker     Quit date: 2000     Years since quittin.0    Smokeless tobacco: Never Used   Vaping Use    Vaping Use: Never used   Substance Use Topics    Alcohol use: Yes     Comment: rarely    Drug use: No        Allergies   Allergen Reactions    Sulfa Antibiotics Itching    Adhesive Tape Rash and Other (See Comments)     Blisters with prolonged use    Hydrocodone-Acetaminophen Nausea And Vomiting    Vicodin [Hydrocodone-Acetaminophen] Nausea And Vomiting   ,   Past Medical History:   Diagnosis Date    Arthritis     [facility] [address] [address]     H/O bladder infections     History of stomach ulcers     Knee cartilage, torn, left     PONV (postoperative nausea and vomiting)     Type II or unspecified type diabetes mellitus without mention of complication, not stated as uncontrolled    ,   Past Surgical History:   Procedure Laterality Date    COLONOSCOPY  2016    DILATION AND CURETTAGE OF UTERUS  2017    ENDOMETRIAL ABLATION      GASTRIC BAND  2011    Dr Betsy Noyola, TOTAL ABDOMINAL  12/15/2020    JOINT REPLACEMENT  12    Right total knee      LYMPHADENECTOMY  10/02/2020    MASTECTOMY Left     SALPINGO-OOPHORECTOMY N/A 12/15/2020    ROBOTIC HYSYER WITH BILATERAL SALPING-OOPHORECTOMY performed by Rula Mercedes MD at [address] [place] [address]     ,   Social History     Tobacco Use    Smoking status: Former Smoker     Quit date: 2000     Years since quittin.0    Smokeless tobacco: Never Used   Vaping Use    Vaping Use: Never used   Substance Use Topics    Alcohol use: Yes     Comment: rarely    Drug use: No   ,   Family History   Problem Relation Age of Onset    Heart Disease Mother         CHF    Cancer Father     COPD Father     Diabetes Father     High Blood Pressure Father     High Cholesterol Father    ,   Immunization History   Administered Date(s) Administered    COVID-19, Coy Peter, PF, 30mcg/0.3mL 03/15/2021, 2021    Influenza Virus Vaccine 2012    Influenza, Ana Broody, IM, (6 mo and older Fluzone, Flulaval, Fluarix and 3 yrs and older Afluria) 2017, 2019   ,   Health Maintenance   Topic Date Due    Hepatitis C screen  Never done    Pneumococcal 0-64 years Vaccine (1 of 2 - PPSV23) Never lesions or discoloration noted on facial skin         [] Abnormal-            Psychiatric:       [x] Normal Affect [] No Hallucinations        [] Abnormal-     Other pertinent observable physical exam findings-     ASSESSMENT/PLAN:   Diagnosis Orders   1. Malignant neoplasm of overlapping sites of left breast in female, estrogen receptor positive ([facility] [address]     2. Other chronic pain  oxyCODONE-acetaminophen (PERCOCET) 5-325 MG per tablet   3. Insomnia, unspecified type     4. Type 2 diabetes mellitus without complication, without long-term current use of insulin ([facility] [address]     5. Nausea  promethazine (PHENERGAN) 25 MG tablet   6. Anxiety     7. Guttate psoriasis  mometasone (ELOCON) 0.1 % cream         No follow-ups on file. Jah Stacy, was evaluated through a synchronous (real-time) audio-video encounter. The patient (or guardian if applicable) is aware that this is a billable service. Verbal consent to proceed has been obtained within the past 12 months. The visit was conducted pursuant to the emergency declaration under the [address] [place] [address] authority and the [employer] and [employer] General Act. Patient identification was verified, and a caregiver was present when appropriate. The patient was located in a state where the provider was credentialed to provide care. Total time spent on this encounter: 20m    --Inocencia Ramirez MD on 9/3/2021 at 11:40 AM    An electronic signature was used to authenticate this note.

## 2021-09-15 ENCOUNTER — TELEPHONE (OUTPATIENT)
Dept: ONCOLOGY | Age: 51
End: 2021-09-15

## 2021-09-15 NOTE — TELEPHONE ENCOUNTER
Patient called and left message. She cannot tolerate Arimidex- she is having pain and depression. She is currently still taking it every other day. She spoke to Dr. Rocio Tse who suggested med to be switched to something different. Patient does not see you until December. She would like to know what to do next as far as medication. Please advise. Thanks.

## 2021-09-21 NOTE — TELEPHONE ENCOUNTER
Recommend she stops taking the Arimidex for two weeks. She should call back in 2 weeks for update on symptoms and will determine next steps at that time.

## 2021-10-01 ENCOUNTER — NURSE ONLY (OUTPATIENT)
Dept: FAMILY MEDICINE CLINIC | Age: 51
End: 2021-10-01
Payer: COMMERCIAL

## 2021-10-01 ENCOUNTER — TELEPHONE (OUTPATIENT)
Dept: FAMILY MEDICINE CLINIC | Age: 51
End: 2021-10-01

## 2021-10-01 DIAGNOSIS — G89.29 OTHER CHRONIC PAIN: ICD-10-CM

## 2021-10-01 DIAGNOSIS — J30.1 SEASONAL ALLERGIC RHINITIS DUE TO POLLEN: Primary | ICD-10-CM

## 2021-10-01 PROCEDURE — 96372 THER/PROPH/DIAG INJ SC/IM: CPT | Performed by: FAMILY MEDICINE

## 2021-10-01 RX ORDER — METHYLPREDNISOLONE ACETATE 80 MG/ML
160 INJECTION, SUSPENSION INTRA-ARTICULAR; INTRALESIONAL; INTRAMUSCULAR; SOFT TISSUE ONCE
Status: COMPLETED | OUTPATIENT
Start: 2021-10-01 | End: 2021-10-01

## 2021-10-01 RX ORDER — OXYCODONE HYDROCHLORIDE AND ACETAMINOPHEN 5; 325 MG/1; MG/1
1 TABLET ORAL EVERY 6 HOURS PRN
Qty: 28 TABLET | Refills: 0 | Status: SHIPPED | OUTPATIENT
Start: 2021-10-01 | End: 2021-10-08

## 2021-10-01 RX ADMIN — METHYLPREDNISOLONE ACETATE 160 MG: 80 INJECTION, SUSPENSION INTRA-ARTICULAR; INTRALESIONAL; INTRAMUSCULAR; SOFT TISSUE at 12:31

## 2021-10-01 NOTE — TELEPHONE ENCOUNTER
Patient is requesting a depomedrol for allergies. Last received Depo 160 mg on 5/13/21. Ok to repeat?

## 2021-10-01 NOTE — TELEPHONE ENCOUNTER
Pt here for a NV steroid injection for allergies and [facility]    Last VV 9/3/21  Last Percocet Rx 9/3/21 for #28.    No f/u   [facility]

## 2021-10-01 NOTE — PROGRESS NOTES
Administrations This Visit     methylPREDNISolone acetate (DEPO-MEDROL) injection 160 mg     Admin Date  10/01/2021  12:31 Action  Given Dose  160 mg Route  IntraMUSCular Site  Dorsogluteal Left Administered By  Elizabeth Dudley RN    Ordering Provider: Srikanth Mendez MD    NDC: 1022-4397-51    Lot#: [id number]    : [id number]. Patient Supplied?: No            See 10/1/21 telephone encounter for Dr. Myrtle Cockayne order.

## 2021-11-01 ENCOUNTER — TELEPHONE (OUTPATIENT)
Dept: ONCOLOGY | Age: 51
End: 2021-11-01

## 2021-11-01 NOTE — TELEPHONE ENCOUNTER
Patient called to let Dr. Clive Garzon know that she has returned from vacation and is wondering plan for Arimidex (this was placed on hold). Please advise.

## 2021-11-02 RX ORDER — LETROZOLE 2.5 MG/1
2.5 TABLET, FILM COATED ORAL EVERY OTHER DAY
Qty: 30 TABLET | Refills: 0 | Status: SHIPPED | OUTPATIENT
Start: 2021-11-02 | End: 2022-04-18

## 2021-11-02 NOTE — TELEPHONE ENCOUNTER
The patient had follow up in August 2021 and was taking Arimidex every other day due to side effects taking it daily. She called in September 2021 and reported increased pain and depression on Arimidex every other day. Instructed to call hold for two weeks and call back with symptoms. Has the patient's pain and depression improved off of Arimidex? Will forward to Dr. Darron Butler as she has follow up on 12/14/21 with Dr. Darron Butler.

## 2021-11-02 NOTE — TELEPHONE ENCOUNTER
Recommend the patient trials different aromatase inhibitor. Prescription sent for Femara every other day. She should take this at night. Keep appt as scheduled with Dr. Juan Pablo De Santiago on 12/14/21.

## 2021-11-02 NOTE — TELEPHONE ENCOUNTER
Patient states that pain and depression are much better. She is now able to work out and feels much happier overall. Please advise.

## 2021-11-06 ENCOUNTER — HOSPITAL ENCOUNTER (INPATIENT)
Age: 51
LOS: 3 days | Discharge: HOME HEALTH | DRG: 481 | End: 2021-11-09
Attending: EMERGENCY MEDICINE | Admitting: ORTHOPAEDIC SURGERY
Payer: COMMERCIAL

## 2021-11-06 ENCOUNTER — APPOINTMENT (OUTPATIENT)
Dept: GENERAL RADIOLOGY | Age: 51
DRG: 481 | End: 2021-11-06
Payer: COMMERCIAL

## 2021-11-06 ENCOUNTER — APPOINTMENT (OUTPATIENT)
Dept: CT IMAGING | Age: 51
DRG: 481 | End: 2021-11-06
Payer: COMMERCIAL

## 2021-11-06 DIAGNOSIS — M97.8XXA PERIPROSTHETIC SUPRACONDYLAR FRACTURE OF FEMUR, INITIAL ENCOUNTER: ICD-10-CM

## 2021-11-06 DIAGNOSIS — S72.441A CLOSED DISPLACED FRACTURE OF DISTAL EPIPHYSIS OF RIGHT FEMUR, INITIAL ENCOUNTER (HCC): Primary | ICD-10-CM

## 2021-11-06 DIAGNOSIS — S82.831A CLOSED FRACTURE OF DISTAL END OF RIGHT FIBULA, UNSPECIFIED FRACTURE MORPHOLOGY, INITIAL ENCOUNTER: ICD-10-CM

## 2021-11-06 DIAGNOSIS — Z96.659 PERIPROSTHETIC SUPRACONDYLAR FRACTURE OF FEMUR, INITIAL ENCOUNTER: ICD-10-CM

## 2021-11-06 PROBLEM — I10 PRIMARY HYPERTENSION: Status: ACTIVE | Noted: 2021-11-06

## 2021-11-06 LAB
ABO: NORMAL
ALBUMIN SERPL-MCNC: 3.9 G/DL (ref 3.5–5.1)
ALP BLD-CCNC: 80 U/L (ref 38–126)
ALT SERPL-CCNC: 82 U/L (ref 11–66)
ANION GAP SERPL CALCULATED.3IONS-SCNC: 16 MEQ/L (ref 8–16)
ANTIBODY SCREEN: NORMAL
AST SERPL-CCNC: 51 U/L (ref 5–40)
AVERAGE GLUCOSE: 165 MG/DL (ref 70–126)
BASOPHILS # BLD: 0.9 %
BASOPHILS ABSOLUTE: 0.1 THOU/MM3 (ref 0–0.1)
BILIRUB SERPL-MCNC: 0.4 MG/DL (ref 0.3–1.2)
BILIRUBIN DIRECT: < 0.2 MG/DL (ref 0–0.3)
BUN BLDV-MCNC: 13 MG/DL (ref 7–22)
CALCIUM SERPL-MCNC: 9 MG/DL (ref 8.5–10.5)
CHLORIDE BLD-SCNC: 103 MEQ/L (ref 98–111)
CO2: 22 MEQ/L (ref 23–33)
CREAT SERPL-MCNC: 0.5 MG/DL (ref 0.4–1.2)
EKG ATRIAL RATE: 66 BPM
EKG P AXIS: 44 DEGREES
EKG P-R INTERVAL: 172 MS
EKG Q-T INTERVAL: 438 MS
EKG QRS DURATION: 80 MS
EKG QTC CALCULATION (BAZETT): 459 MS
EKG R AXIS: 5 DEGREES
EKG T AXIS: 19 DEGREES
EKG VENTRICULAR RATE: 66 BPM
EOSINOPHIL # BLD: 3.3 %
EOSINOPHILS ABSOLUTE: 0.4 THOU/MM3 (ref 0–0.4)
ERYTHROCYTE [DISTWIDTH] IN BLOOD BY AUTOMATED COUNT: 13.5 % (ref 11.5–14.5)
ERYTHROCYTE [DISTWIDTH] IN BLOOD BY AUTOMATED COUNT: 45.4 FL (ref 35–45)
GFR SERPL CREATININE-BSD FRML MDRD: > 90 ML/MIN/1.73M2
GLUCOSE BLD-MCNC: 128 MG/DL (ref 70–108)
GLUCOSE BLD-MCNC: 130 MG/DL (ref 70–108)
GLUCOSE BLD-MCNC: 159 MG/DL (ref 70–108)
HBA1C MFR BLD: 7.5 % (ref 4.4–6.4)
HCT VFR BLD CALC: 48.1 % (ref 37–47)
HEMOGLOBIN: 16.2 GM/DL (ref 12–16)
IMMATURE GRANS (ABS): 0.12 THOU/MM3 (ref 0–0.07)
IMMATURE GRANULOCYTES: 1 %
LYMPHOCYTES # BLD: 23.4 %
LYMPHOCYTES ABSOLUTE: 2.7 THOU/MM3 (ref 1–4.8)
MCH RBC QN AUTO: 30.9 PG (ref 26–33)
MCHC RBC AUTO-ENTMCNC: 33.7 GM/DL (ref 32.2–35.5)
MCV RBC AUTO: 91.8 FL (ref 81–99)
MONOCYTES # BLD: 4.7 %
MONOCYTES ABSOLUTE: 0.5 THOU/MM3 (ref 0.4–1.3)
NUCLEATED RED BLOOD CELLS: 0 /100 WBC
PLATELET # BLD: 579 THOU/MM3 (ref 130–400)
PMV BLD AUTO: 9.7 FL (ref 9.4–12.4)
POTASSIUM REFLEX MAGNESIUM: 4 MEQ/L (ref 3.5–5.2)
RBC # BLD: 5.24 MILL/MM3 (ref 4.2–5.4)
RH FACTOR: NORMAL
SEG NEUTROPHILS: 66.7 %
SEGMENTED NEUTROPHILS ABSOLUTE COUNT: 7.8 THOU/MM3 (ref 1.8–7.7)
SODIUM BLD-SCNC: 141 MEQ/L (ref 135–145)
TOTAL PROTEIN: 6.9 G/DL (ref 6.1–8)
WBC # BLD: 11.7 THOU/MM3 (ref 4.8–10.8)

## 2021-11-06 PROCEDURE — 82948 REAGENT STRIP/BLOOD GLUCOSE: CPT

## 2021-11-06 PROCEDURE — 76376 3D RENDER W/INTRP POSTPROCES: CPT

## 2021-11-06 PROCEDURE — 6360000002 HC RX W HCPCS

## 2021-11-06 PROCEDURE — 6360000002 HC RX W HCPCS: Performed by: STUDENT IN AN ORGANIZED HEALTH CARE EDUCATION/TRAINING PROGRAM

## 2021-11-06 PROCEDURE — 6370000000 HC RX 637 (ALT 250 FOR IP): Performed by: INTERNAL MEDICINE

## 2021-11-06 PROCEDURE — 1200000000 HC SEMI PRIVATE

## 2021-11-06 PROCEDURE — 85025 COMPLETE CBC W/AUTO DIFF WBC: CPT

## 2021-11-06 PROCEDURE — 6360000002 HC RX W HCPCS: Performed by: ORTHOPAEDIC SURGERY

## 2021-11-06 PROCEDURE — 6820000001 HC L2 TRAUMA SURGERY EVALUATION: Performed by: ORTHOPAEDIC SURGERY

## 2021-11-06 PROCEDURE — 86923 COMPATIBILITY TEST ELECTRIC: CPT

## 2021-11-06 PROCEDURE — 99221 1ST HOSP IP/OBS SF/LOW 40: CPT | Performed by: INTERNAL MEDICINE

## 2021-11-06 PROCEDURE — 73700 CT LOWER EXTREMITY W/O DYE: CPT

## 2021-11-06 PROCEDURE — 73560 X-RAY EXAM OF KNEE 1 OR 2: CPT

## 2021-11-06 PROCEDURE — 80076 HEPATIC FUNCTION PANEL: CPT

## 2021-11-06 PROCEDURE — 86900 BLOOD TYPING SEROLOGIC ABO: CPT

## 2021-11-06 PROCEDURE — 96374 THER/PROPH/DIAG INJ IV PUSH: CPT

## 2021-11-06 PROCEDURE — 96375 TX/PRO/DX INJ NEW DRUG ADDON: CPT

## 2021-11-06 PROCEDURE — 6370000000 HC RX 637 (ALT 250 FOR IP): Performed by: PHYSICIAN ASSISTANT

## 2021-11-06 PROCEDURE — 93005 ELECTROCARDIOGRAM TRACING: CPT | Performed by: STUDENT IN AN ORGANIZED HEALTH CARE EDUCATION/TRAINING PROGRAM

## 2021-11-06 PROCEDURE — 86901 BLOOD TYPING SEROLOGIC RH(D): CPT

## 2021-11-06 PROCEDURE — 2580000003 HC RX 258: Performed by: ORTHOPAEDIC SURGERY

## 2021-11-06 PROCEDURE — 99284 EMERGENCY DEPT VISIT MOD MDM: CPT

## 2021-11-06 PROCEDURE — 96376 TX/PRO/DX INJ SAME DRUG ADON: CPT

## 2021-11-06 PROCEDURE — 83036 HEMOGLOBIN GLYCOSYLATED A1C: CPT

## 2021-11-06 PROCEDURE — 6370000000 HC RX 637 (ALT 250 FOR IP): Performed by: ORTHOPAEDIC SURGERY

## 2021-11-06 PROCEDURE — 86850 RBC ANTIBODY SCREEN: CPT

## 2021-11-06 PROCEDURE — 36415 COLL VENOUS BLD VENIPUNCTURE: CPT

## 2021-11-06 PROCEDURE — 73600 X-RAY EXAM OF ANKLE: CPT

## 2021-11-06 PROCEDURE — 80048 BASIC METABOLIC PNL TOTAL CA: CPT

## 2021-11-06 PROCEDURE — 6360000002 HC RX W HCPCS: Performed by: PHYSICIAN ASSISTANT

## 2021-11-06 RX ORDER — DIPHENHYDRAMINE HYDROCHLORIDE 50 MG/ML
INJECTION INTRAMUSCULAR; INTRAVENOUS
Status: COMPLETED
Start: 2021-11-06 | End: 2021-11-06

## 2021-11-06 RX ORDER — OXYCODONE HYDROCHLORIDE AND ACETAMINOPHEN 5; 325 MG/1; MG/1
2 TABLET ORAL EVERY 4 HOURS PRN
Status: DISCONTINUED | OUTPATIENT
Start: 2021-11-06 | End: 2021-11-09 | Stop reason: HOSPADM

## 2021-11-06 RX ORDER — SODIUM CHLORIDE 0.9 % (FLUSH) 0.9 %
5-40 SYRINGE (ML) INJECTION PRN
Status: DISCONTINUED | OUTPATIENT
Start: 2021-11-06 | End: 2021-11-09 | Stop reason: HOSPADM

## 2021-11-06 RX ORDER — DEXTROSE MONOHYDRATE 50 MG/ML
100 INJECTION, SOLUTION INTRAVENOUS PRN
Status: DISCONTINUED | OUTPATIENT
Start: 2021-11-06 | End: 2021-11-09 | Stop reason: HOSPADM

## 2021-11-06 RX ORDER — ACETAMINOPHEN 500 MG
500 TABLET ORAL EVERY 6 HOURS
Status: DISCONTINUED | OUTPATIENT
Start: 2021-11-07 | End: 2021-11-07

## 2021-11-06 RX ORDER — ONDANSETRON 2 MG/ML
4 INJECTION INTRAMUSCULAR; INTRAVENOUS ONCE
Status: COMPLETED | OUTPATIENT
Start: 2021-11-06 | End: 2021-11-06

## 2021-11-06 RX ORDER — SODIUM CHLORIDE 9 MG/ML
INJECTION, SOLUTION INTRAVENOUS CONTINUOUS
Status: DISCONTINUED | OUTPATIENT
Start: 2021-11-06 | End: 2021-11-09 | Stop reason: HOSPADM

## 2021-11-06 RX ORDER — ONDANSETRON 2 MG/ML
4 INJECTION INTRAMUSCULAR; INTRAVENOUS EVERY 6 HOURS PRN
Status: DISCONTINUED | OUTPATIENT
Start: 2021-11-06 | End: 2021-11-09 | Stop reason: HOSPADM

## 2021-11-06 RX ORDER — ONDANSETRON 4 MG/1
4 TABLET, ORALLY DISINTEGRATING ORAL EVERY 8 HOURS PRN
Status: DISCONTINUED | OUTPATIENT
Start: 2021-11-06 | End: 2021-11-09 | Stop reason: HOSPADM

## 2021-11-06 RX ORDER — DEXTROSE MONOHYDRATE 25 G/50ML
12.5 INJECTION, SOLUTION INTRAVENOUS PRN
Status: DISCONTINUED | OUTPATIENT
Start: 2021-11-06 | End: 2021-11-09 | Stop reason: HOSPADM

## 2021-11-06 RX ORDER — SODIUM CHLORIDE 9 MG/ML
25 INJECTION, SOLUTION INTRAVENOUS PRN
Status: DISCONTINUED | OUTPATIENT
Start: 2021-11-06 | End: 2021-11-09 | Stop reason: HOSPADM

## 2021-11-06 RX ORDER — ONDANSETRON 4 MG/1
4 TABLET, ORALLY DISINTEGRATING ORAL ONCE
Status: DISCONTINUED | OUTPATIENT
Start: 2021-11-06 | End: 2021-11-06

## 2021-11-06 RX ORDER — DIPHENHYDRAMINE HYDROCHLORIDE 50 MG/ML
INJECTION INTRAMUSCULAR; INTRAVENOUS
Status: DISPENSED
Start: 2021-11-06 | End: 2021-11-07

## 2021-11-06 RX ORDER — CEFAZOLIN SODIUM 2 G/100ML
2000 INJECTION, SOLUTION INTRAVENOUS
Status: ACTIVE | OUTPATIENT
Start: 2021-11-06 | End: 2021-11-07

## 2021-11-06 RX ORDER — DIPHENHYDRAMINE HYDROCHLORIDE 50 MG/ML
50 INJECTION INTRAMUSCULAR; INTRAVENOUS ONCE
Status: COMPLETED | OUTPATIENT
Start: 2021-11-06 | End: 2021-11-06

## 2021-11-06 RX ORDER — FENTANYL CITRATE 50 UG/ML
50 INJECTION, SOLUTION INTRAMUSCULAR; INTRAVENOUS ONCE
Status: COMPLETED | OUTPATIENT
Start: 2021-11-06 | End: 2021-11-06

## 2021-11-06 RX ORDER — NICOTINE POLACRILEX 4 MG
15 LOZENGE BUCCAL PRN
Status: DISCONTINUED | OUTPATIENT
Start: 2021-11-06 | End: 2021-11-09 | Stop reason: HOSPADM

## 2021-11-06 RX ORDER — OXYCODONE HYDROCHLORIDE AND ACETAMINOPHEN 5; 325 MG/1; MG/1
1 TABLET ORAL EVERY 4 HOURS PRN
Status: DISCONTINUED | OUTPATIENT
Start: 2021-11-06 | End: 2021-11-09 | Stop reason: HOSPADM

## 2021-11-06 RX ORDER — CYCLOBENZAPRINE HCL 10 MG
10 TABLET ORAL 3 TIMES DAILY PRN
Status: DISCONTINUED | OUTPATIENT
Start: 2021-11-06 | End: 2021-11-09 | Stop reason: HOSPADM

## 2021-11-06 RX ORDER — ONDANSETRON 2 MG/ML
INJECTION INTRAMUSCULAR; INTRAVENOUS
Status: COMPLETED
Start: 2021-11-06 | End: 2021-11-06

## 2021-11-06 RX ORDER — LORAZEPAM 2 MG/ML
1 INJECTION INTRAMUSCULAR ONCE
Status: COMPLETED | OUTPATIENT
Start: 2021-11-06 | End: 2021-11-06

## 2021-11-06 RX ORDER — MORPHINE SULFATE 4 MG/ML
4 INJECTION, SOLUTION INTRAMUSCULAR; INTRAVENOUS ONCE
Status: DISCONTINUED | OUTPATIENT
Start: 2021-11-06 | End: 2021-11-06

## 2021-11-06 RX ORDER — HYDRALAZINE HYDROCHLORIDE 25 MG/1
25 TABLET, FILM COATED ORAL EVERY 8 HOURS SCHEDULED
Status: DISCONTINUED | OUTPATIENT
Start: 2021-11-06 | End: 2021-11-08

## 2021-11-06 RX ORDER — DIPHENHYDRAMINE HYDROCHLORIDE 50 MG/ML
25 INJECTION INTRAMUSCULAR; INTRAVENOUS ONCE
Status: DISCONTINUED | OUTPATIENT
Start: 2021-11-06 | End: 2021-11-09 | Stop reason: HOSPADM

## 2021-11-06 RX ORDER — MORPHINE SULFATE 2 MG/ML
2 INJECTION, SOLUTION INTRAMUSCULAR; INTRAVENOUS
Status: DISPENSED | OUTPATIENT
Start: 2021-11-06 | End: 2021-11-08

## 2021-11-06 RX ORDER — SODIUM CHLORIDE 0.9 % (FLUSH) 0.9 %
5-40 SYRINGE (ML) INJECTION EVERY 12 HOURS SCHEDULED
Status: DISCONTINUED | OUTPATIENT
Start: 2021-11-06 | End: 2021-11-09 | Stop reason: HOSPADM

## 2021-11-06 RX ORDER — MORPHINE SULFATE 4 MG/ML
4 INJECTION, SOLUTION INTRAMUSCULAR; INTRAVENOUS
Status: DISCONTINUED | OUTPATIENT
Start: 2021-11-06 | End: 2021-11-06 | Stop reason: SDUPTHER

## 2021-11-06 RX ADMIN — DIPHENHYDRAMINE HYDROCHLORIDE 50 MG: 50 INJECTION, SOLUTION INTRAMUSCULAR; INTRAVENOUS at 10:02

## 2021-11-06 RX ADMIN — SODIUM CHLORIDE: 9 INJECTION, SOLUTION INTRAVENOUS at 16:13

## 2021-11-06 RX ADMIN — HYDROMORPHONE HYDROCHLORIDE 0.5 MG: 1 INJECTION, SOLUTION INTRAMUSCULAR; INTRAVENOUS; SUBCUTANEOUS at 23:45

## 2021-11-06 RX ADMIN — MORPHINE SULFATE 4 MG: 4 INJECTION, SOLUTION INTRAMUSCULAR; INTRAVENOUS at 16:13

## 2021-11-06 RX ADMIN — FENTANYL CITRATE 50 MCG: 50 INJECTION, SOLUTION INTRAMUSCULAR; INTRAVENOUS at 11:35

## 2021-11-06 RX ADMIN — HYDROMORPHONE HYDROCHLORIDE 0.5 MG: 1 INJECTION, SOLUTION INTRAMUSCULAR; INTRAVENOUS; SUBCUTANEOUS at 12:07

## 2021-11-06 RX ADMIN — DIPHENHYDRAMINE HYDROCHLORIDE 50 MG: 50 INJECTION INTRAMUSCULAR; INTRAVENOUS at 10:02

## 2021-11-06 RX ADMIN — ONDANSETRON 4 MG: 2 INJECTION INTRAMUSCULAR; INTRAVENOUS at 12:08

## 2021-11-06 RX ADMIN — Medication 1 MG: at 10:02

## 2021-11-06 RX ADMIN — HYDROMORPHONE HYDROCHLORIDE 1 MG: 1 INJECTION, SOLUTION INTRAMUSCULAR; INTRAVENOUS; SUBCUTANEOUS at 10:02

## 2021-11-06 RX ADMIN — FENTANYL CITRATE 50 MCG: 50 INJECTION, SOLUTION INTRAMUSCULAR; INTRAVENOUS at 10:49

## 2021-11-06 RX ADMIN — ONDANSETRON 4 MG: 2 INJECTION INTRAMUSCULAR; INTRAVENOUS at 10:02

## 2021-11-06 RX ADMIN — ACETAMINOPHEN 500 MG: 500 TABLET ORAL at 23:45

## 2021-11-06 RX ADMIN — SODIUM CHLORIDE, PRESERVATIVE FREE 10 ML: 5 INJECTION INTRAVENOUS at 14:00

## 2021-11-06 RX ADMIN — HYDRALAZINE HYDROCHLORIDE 25 MG: 25 TABLET ORAL at 16:24

## 2021-11-06 RX ADMIN — MORPHINE SULFATE 4 MG: 4 INJECTION, SOLUTION INTRAMUSCULAR; INTRAVENOUS at 18:28

## 2021-11-06 RX ADMIN — CYCLOBENZAPRINE 10 MG: 10 TABLET, FILM COATED ORAL at 23:45

## 2021-11-06 RX ADMIN — LORAZEPAM 1 MG: 2 INJECTION INTRAMUSCULAR; INTRAVENOUS at 12:08

## 2021-11-06 RX ADMIN — MORPHINE SULFATE 4 MG: 4 INJECTION, SOLUTION INTRAMUSCULAR; INTRAVENOUS at 20:49

## 2021-11-06 RX ADMIN — OXYCODONE AND ACETAMINOPHEN 2 TABLET: 5; 325 TABLET ORAL at 18:27

## 2021-11-06 RX ADMIN — MORPHINE SULFATE 4 MG: 4 INJECTION, SOLUTION INTRAMUSCULAR; INTRAVENOUS at 14:00

## 2021-11-06 RX ADMIN — OXYCODONE AND ACETAMINOPHEN 2 TABLET: 5; 325 TABLET ORAL at 22:31

## 2021-11-06 RX ADMIN — OXYCODONE AND ACETAMINOPHEN 2 TABLET: 5; 325 TABLET ORAL at 14:19

## 2021-11-06 ASSESSMENT — PAIN SCALES - GENERAL
PAINLEVEL_OUTOF10: 10
PAINLEVEL_OUTOF10: 10
PAINLEVEL_OUTOF10: 9
PAINLEVEL_OUTOF10: 10
PAINLEVEL_OUTOF10: 9
PAINLEVEL_OUTOF10: 10

## 2021-11-06 ASSESSMENT — PAIN DESCRIPTION - ONSET
ONSET: SUDDEN
ONSET: ON-GOING
ONSET: ON-GOING

## 2021-11-06 ASSESSMENT — PAIN DESCRIPTION - FREQUENCY
FREQUENCY: CONTINUOUS

## 2021-11-06 ASSESSMENT — PAIN DESCRIPTION - ORIENTATION
ORIENTATION: RIGHT

## 2021-11-06 ASSESSMENT — PAIN DESCRIPTION - LOCATION
LOCATION: KNEE
LOCATION: LEG;HIP
LOCATION: HIP;LEG
LOCATION: HIP;LEG
LOCATION: KNEE

## 2021-11-06 ASSESSMENT — PAIN DESCRIPTION - DESCRIPTORS
DESCRIPTORS: SHARP
DESCRIPTORS: ACHING;CRUSHING;CONSTANT
DESCRIPTORS: SHARP

## 2021-11-06 ASSESSMENT — ENCOUNTER SYMPTOMS
COUGH: 0
VOMITING: 0
NAUSEA: 0
SHORTNESS OF BREATH: 0
SORE THROAT: 0
DIARRHEA: 0
BLOOD IN STOOL: 0
TROUBLE SWALLOWING: 0
ABDOMINAL PAIN: 0

## 2021-11-06 ASSESSMENT — PAIN - FUNCTIONAL ASSESSMENT: PAIN_FUNCTIONAL_ASSESSMENT: ACTIVITIES ARE NOT PREVENTED

## 2021-11-06 ASSESSMENT — PAIN DESCRIPTION - PAIN TYPE
TYPE: ACUTE PAIN

## 2021-11-06 ASSESSMENT — PAIN DESCRIPTION - PROGRESSION
CLINICAL_PROGRESSION: RAPIDLY WORSENING
CLINICAL_PROGRESSION: NOT CHANGED
CLINICAL_PROGRESSION: GRADUALLY WORSENING

## 2021-11-06 NOTE — CONSULTS
Consult History & Physical      Patient:  Vamshi Ye  YOB: 1970    MRN: [id number]     Acct: [de-identified]      Chief Complaint:  Knee pain    Date of Service: Pt seen/examined in consultation on 11.6.2021    History Of Present Illness:      44 y.o. female who we are asked to see/evaluate by Joel Buenrostro MD for medical management of Diabetes Mellitus    Past Medical History:        Diagnosis Date    Arthritis     Breast cancer ([address]     H/O bladder infections     History of stomach ulcers     Knee cartilage, torn, left     PONV (postoperative nausea and vomiting)     Type II or unspecified type diabetes mellitus without mention of complication, not stated as uncontrolled        Past Surgical History:        Procedure Laterality Date    COLONOSCOPY  2016    DILATION AND CURETTAGE OF UTERUS  04/17/2017    ENDOMETRIAL ABLATION      GASTRIC BAND  07/12/2011    Dr Indigo Hamilton, TOTAL ABDOMINAL  12/15/2020    JOINT REPLACEMENT  5/30/12    Right total knee      LYMPHADENECTOMY  10/02/2020    MASTECTOMY Left     SALPINGO-OOPHORECTOMY N/A 12/15/2020    ROBOTIC HYSYER WITH BILATERAL SALPING-OOPHORECTOMY performed by Anjali Rodrigues MD at [address] [place] [address]         Medications Prior to Admission:    Prior to Admission medications    Medication Sig Start Date End Date Taking? Authorizing Provider   letrozole [facility]) 2.5 MG tablet Take 1 tablet by mouth every other day 11/2/21   Char De La Cruz PA-C   promethazine (PHENERGAN) 25 MG tablet Take 1 tablet by mouth every 8 hours as needed for Nausea 9/3/21   Edith Angelo MD   mometasone (ELOCON) 0.1 % cream Apply topically daily.  9/3/21   Edith Angelo MD   Turmeric 400 MG CAPS Take by mouth    Historical Provider, MD   [place]-3 1000 MG CAPS Take by mouth daily    Historical Provider, MD   diclofenac sodium (VOLTAREN) 1 % GEL Apply 2 g topically 4 times daily 2/17/21 Lima Nett, APRN - CNP   cyclobenzaprine (FLEXERIL) 10 MG tablet take 1 tablet by mouth twice a day if needed for muscle spasm 2/11/21   Lori Burgos MD   ondansetron [facility]) 4 MG tablet Take 1 tablet by mouth every 8 hours as needed for Nausea 2/11/21   Lori Burgos MD   vitamin D (CHOLECALCIFEROL) 125 MCG (5000 UT) CAPS capsule Take 1 capsule by mouth daily 2/11/21   Lori Burgos MD   PARoxetine (PAXIL) 40 MG tablet Take 1 tablet by mouth daily 2/11/21   Lori Burgos MD   metFORMIN (GLUCOPHAGE-XR) 500 MG extended release tablet Take 2 tablets by mouth 2 times daily take 2 tablets by mouth every morning  Patient taking differently: Take 1,000 mg by mouth every evening take 4 tablets by mouth every morning 12/29/20   JERI Macedo CNP   loratadine (CLARITIN) 10 MG tablet take 1 tablet by mouth once daily 12/21/20   JERI Macedo CNP   rOPINIRole (REQUIP) 0.5 MG tablet Take 2 tablets by mouth nightly 12/10/20   JERI Macedo CNP   fluticasone (FLONASE) 50 MCG/ACT nasal spray 1 spray by Nasal route daily 12/10/20   JERI Macedo CNP   vitamin C (ASCORBIC ACID) 500 MG tablet Take 500 mg by mouth daily    Historical Provider, MD   ZINC PO Take 1 tablet by mouth daily    Historical Provider, MD   magnesium oxide (MAG-OX) 400 MG tablet Take 400 mg by mouth daily    Historical Provider, MD   Glucosamine-Chondroitin (GLUCOSAMINE CHONDR COMPLEX PO) Take by mouth daily     Historical Provider, MD   vitamin E 400 UNIT capsule Take 400 Units by mouth 2 times daily    Historical Provider, MD   albuterol sulfate HFA (PROAIR HFA) 108 (90 Base) MCG/ACT inhaler Inhale 2 puffs into the lungs every 6 hours as needed for Wheezing 12/9/19   JERI Macedo CNP   Glucose Blood (BLOOD GLUCOSE TEST STRIPS) STRP Contour EZ test strips and lancets test daily E11.9 5/7/18   JERI Macedo CNP       Allergies:  Sulfa antibiotics, Adhesive tape, Hydrocodone-acetaminophen, and Vicodin [hydrocodone-acetaminophen]    Social History:      The patient currently lives home    TOBACCO:   reports that she quit smoking about 21 years ago. She has never used smokeless tobacco.  ETOH:   reports current alcohol use. Family History:     Reviewed in detail and negative for DM, CAD, Cancer, CVA. Positive as follows:        Problem Relation Age of Onset    Heart Disease Mother         CHF    Cancer Father     COPD Father     Diabetes Father     High Blood Pressure Father     High Cholesterol Father        Diet:  Diet NPO  ADULT DIET; Regular; 4 carb choices (60 gm/meal)    REVIEW OF SYSTEMS:   Pertinent positives as noted in the HPI. All other systems reviewed and negative. PHYSICAL EXAM:  BP (!) 172/106   Pulse 65   Temp 98.6 °F (37 °C) (Oral)   Resp 18   Wt 228 lb (103.4 kg)   LMP 11/17/2020 (Approximate) Comment: tubal ligation/ablation  SpO2 97%   BMI 40.39 kg/m²   General appearance:  No apparent distress, appears stated age and cooperative. HEENT:  Normal cephalic, atraumatic without obvious deformity. Pupils equal, round, and reactive to light. Extra ocular muscles intact. Conjunctivae/corneas clear. Neck: Supple, with full range of motion. no jugular venous distention. Trachea midline. no carotid bruits  Respiratory:  Normal respiratory effort. Clear to auscultation, bilaterally without Rales/Wheezes/Rhonchi. Breath sounds equal bilaterally  Cardiovascular:  Regular rate and rhythm with normal S1/S2 without murmurs, rubs or gallops. PMI non displaced  Abdomen: Soft, non-tender, non-distended with normal bowel sounds. No guarding, rebound. Musculoskeletal:  No clubbing, cyanosis or edema bilaterally. Full range of motion without deformity. Skin: Skin color, texture, turgor normal.  No rashes or lesions, or suspicious lesions. Neurologic:  Neurovascularly intact without any focal sensory/motor deficits.  Cranial nerves: II-XII intact, grossly non-focal.  Psychiatric: displacement of the distal fragment. The distal femur diaphysis/metaphysis appears to extend    through the quadriceps muscle. 2. Acute obliquely oriented fracture of the distal fibula. This is nondisplaced. 3. Acute fractures of the medial malleolus and posterior tibia. These are nondisplaced. **This report has been created using voice recognition software. It may contain minor errors which are inherent in voice recognition technology. **      Final report electronically signed by Dr. Dilcia Qureshi on 11/6/2021 1:49 PM      CT RECONSTRUCTION WO POST PROCESS   Final Result      1. Acute comminuted fractures of the distal right femur at the level the metaphysis. This is just above the knee prosthesis. There is significant posterior displacement of the distal fragment. The distal femur diaphysis/metaphysis appears to extend    through the quadriceps muscle. 2. Acute obliquely oriented fracture of the distal fibula. This is nondisplaced. 3. Acute fractures of the medial malleolus and posterior tibia. These are nondisplaced. **This report has been created using voice recognition software. It may contain minor errors which are inherent in voice recognition technology. **      Final report electronically signed by Dr. Dilcia Qureshi on 11/6/2021 1:49 PM      XR KNEE RIGHT (1-2 VIEWS)   Final Result   Markedly displaced distal femoral fracture, improved from earlier study. **This report has been created using voice recognition software. It may contain minor errors which are inherent in voice recognition technology. **      Final report electronically signed by Dr. Geraldine Reinoso on 11/6/2021 1:18 PM      XR ANKLE RIGHT (2 VIEWS)   Final Result   Nondisplaced oblique fracture distal fibular shaft. **This report has been created using voice recognition software. It may contain minor errors which are inherent in voice recognition technology. **      Final report electronically signed by Dr. Angeli Hahn on 11/6/2021 1:20 PM      XR KNEE RIGHT (1-2 VIEWS)   Final Result    Acute fracture of the distal right femur just above the right knee prosthesis. There is significant angulation and displacement. **This report has been created using voice recognition software. It may contain minor errors which are inherent in voice recognition technology. **      Final report electronically signed by Dr. Caroline Finnegan on 11/6/2021 10:14 AM             EKG:  I have reviewed the EKG with the following interpretation:    Not ordered     DVT prophylaxis: [] Lovenox                                 [] SCDs                                 [] SQ Heparin                                 [] Encourage ambulation           [] Already on Anticoagulation      Code Status: Full Code    PT/OT Eval Status: Active and ongoing      ASSESSMENT:    C/Leena Ferguson 1106 Problems    Diagnosis Date Noted    Primary hypertension [I10] 11/06/2021    Type 2 diabetes mellitus without complication, without long-term current use of insulin ([facility] [address] [E11.9]        PLAN:    1. Will place the patient on carb controlled diet, low-dose insulin sliding scale, check hemoglobin A1c, urine for microalbumin, Accu-Cheks before meals and at bedtime. Will hold patient's Metformin at this time, may resume Metformin at the time of discharge, will obtain dietary consult for diabetes management  2. We'll place the patient on blood pressure medication and trend. Also obtain EKG and echocardiogram to evaluate left ventricular function         Thank you for the consultation.     Electronically signed by Elizabeth Wilcox DO on 11/6/2021 at 2:17 PM

## 2021-11-06 NOTE — ED NOTES
Patient in bed and talking with resident at the bedside. Patient provided education regarding current situation with pt plan of care. Patient expresses severe pain in her thigh/knee. Patient VSS. . Will continue to monitor. Call light within reach.        Elieser Light RN  11/06/21 1200

## 2021-11-06 NOTE — PROGRESS NOTES
Orthopedic Surgery      Orthopaedic Attending Note    Discussed with  Dr Arnold Washington    Will cancel echo for now. Will order postop    Patient is good for surgery tomorrow.     Plan AM surgery Sunday

## 2021-11-06 NOTE — ED NOTES
Bed: 023A  Expected date:   Expected time:   Means of arrival: [facility]  Comments:     Bev Brooks RN  11/06/21 8356

## 2021-11-06 NOTE — ED PROVIDER NOTES
[place] ENCOUNTER          Pt Name: Elvis Montoya  MRN: [id number]  [place] 1970  Date of evaluation: 11/6/2021  Treating Resident Physician: Josie Ramsey MD  Supervising Physician: Cait Ennis MD    CHIEF COMPLAINT       Chief Complaint   Patient presents with    Knee Injury     History obtained from the patient. HISTORY OF PRESENT ILLNESS    HPI  Elvis Montoya is a 44 y.o. female with PSHx of right total knee replacement who presents to the emergency department for evaluation of right knee pain. Patient reports falling off the bleachers at her daughter's sports game. She was brought in by [facility]. Patient has a deformity of the right leg with ecchymosis and hemarthrosis. Patient denies any numbness or loss sensation. She denies loss of consciousness or hitting her head. She does not take anticoagulants and her last meal was yesterday. The patient has no other acute complaints at this time. REVIEW OF SYSTEMS   Review of Systems   Constitutional: Negative for chills, diaphoresis, fatigue and fever. HENT: Negative for sore throat and trouble swallowing. Eyes: Negative for visual disturbance. Respiratory: Negative for cough and shortness of breath. Cardiovascular: Negative for chest pain, palpitations and leg swelling. Gastrointestinal: Negative for abdominal pain, blood in stool, diarrhea, nausea and vomiting. Genitourinary: Negative for dysuria, hematuria and urgency. Musculoskeletal: Negative for arthralgias and myalgias. Skin: Positive for wound. Negative for rash. Neurological: Negative for seizures, syncope, numbness and headaches.          PAST MEDICAL AND SURGICAL HISTORY     Past Medical History:   Diagnosis Date    Arthritis     Breast cancer ([facility] [address]     H/O bladder infections     History of stomach ulcers     Knee cartilage, torn, left     PONV (postoperative nausea and vomiting)  Type II or unspecified type diabetes mellitus without mention of complication, not stated as uncontrolled      Past Surgical History:   Procedure Laterality Date    COLONOSCOPY  2016    DILATION AND CURETTAGE OF UTERUS  04/17/2017    ENDOMETRIAL ABLATION      GASTRIC BAND  07/12/2011     [facility], TOTAL ABDOMINAL  12/15/2020    JOINT REPLACEMENT  5/30/12    Right total knee      LYMPHADENECTOMY  10/02/2020    MASTECTOMY Left     SALPINGO-OOPHORECTOMY N/A 12/15/2020    ROBOTIC HYSYER WITH BILATERAL SALPING-OOPHORECTOMY performed by Vera Gonsalez MD at [address] [place] [address]     Current Facility-Administered Medications:     diphenhydrAMINE (BENADRYL) 50 MG/ML injection, , , ,     diphenhydrAMINE (BENADRYL) injection 25 mg, 25 mg, IntraVENous, Once, Gilda Palafox MD    0.9 % sodium chloride infusion, , IntraVENous, Continuous, Margie See MD    sodium chloride flush 0.9 % injection 5-40 mL, 5-40 mL, IntraVENous, 2 times per day, Margie See MD    sodium chloride flush 0.9 % injection 5-40 mL, 5-40 mL, IntraVENous, PRN, Margie See MD    0.9 % sodium chloride infusion, 25 mL, IntraVENous, PRN, Margie See MD    morphine (PF) injection 2 mg, 2 mg, IntraVENous, Q2H PRN **OR** morphine injection 4 mg, 4 mg, IntraVENous, Q2H PRN, Margie See MD    ondansetron (ZOFRAN-ODT) disintegrating tablet 4 mg, 4 mg, Oral, Q8H PRN **OR** ondansetron (ZOFRAN) injection 4 mg, 4 mg, IntraVENous, Q6H PRN, Margie See MD    ceFAZolin (ANCEF) 2000 mg in dextrose 4 % 100 mL IVPB (premix), 2,000 mg, IntraVENous, On Call to OR, Margie See MD    oxyCODONE-acetaminophen (PERCOCET) 5-325 MG per tablet 1 tablet, 1 tablet, Oral, Q4H PRN, Margie See MD    oxyCODONE-acetaminophen (PERCOCET) 5-325 MG per tablet 2 tablet, 2 tablet, Oral, Q4H PRN, Margie See MD      SOCIAL HISTORY     Social History Social History Narrative    Not on file     Social History     Tobacco Use    Smoking status: Former Smoker     Quit date: 2000     Years since quittin.1    Smokeless tobacco: Never Used   Vaping Use    Vaping Use: Never used   Substance Use Topics    Alcohol use: Yes     Comment: rarely    Drug use: No         ALLERGIES     Allergies   Allergen Reactions    Sulfa Antibiotics Itching    Adhesive Tape Rash and Other (See Comments)     Blisters with prolonged use    Hydrocodone-Acetaminophen Nausea And Vomiting    Vicodin [Hydrocodone-Acetaminophen] Nausea And Vomiting         FAMILY HISTORY     Family History   Problem Relation Age of Onset    Heart Disease Mother         CHF    Cancer Father     COPD Father     Diabetes Father     High Blood Pressure Father     High Cholesterol Father          PREVIOUS RECORDS   Previous records reviewed: I reviewed the patient's past medical records including relevant labs, imaging and procedures. PHYSICAL EXAM     ED Triage Vitals [21 0935]   BP Temp Temp Source Pulse Resp SpO2 Height Weight   (!) 174/108 98.6 °F (37 °C) Oral 74 21 97 % -- --     Initial vital signs and nursing assessment reviewed and normal. Body mass index is 40.39 kg/m². Pulsoximetry is normal per my interpretation. Additional Vital Signs:  Vitals:    21 1159   BP: (!) 172/106   Pulse: 65   Resp: 18   Temp:    SpO2: 97%       Physical Exam  Vitals and nursing note reviewed. Constitutional:       Appearance: Normal appearance. HENT:      Head: Normocephalic and atraumatic. Right Ear: Tympanic membrane normal.      Left Ear: Tympanic membrane normal.      Nose: Nose normal.      Mouth/Throat:      Mouth: Mucous membranes are moist.      Pharynx: Oropharynx is clear. No oropharyngeal exudate. Eyes:      General: No scleral icterus. Extraocular Movements: Extraocular movements intact.       Conjunctiva/sclera: Conjunctivae normal.      Pupils: Pupils are equal, round, and reactive to light. Cardiovascular:      Rate and Rhythm: Normal rate and regular rhythm. Pulses: Normal pulses. Heart sounds: Normal heart sounds. No murmur heard. No friction rub. No gallop. Pulmonary:      Effort: Pulmonary effort is normal. No respiratory distress. Breath sounds: Normal breath sounds. Abdominal:      Palpations: Abdomen is soft. Tenderness: There is no abdominal tenderness. There is no right CVA tenderness, left CVA tenderness, guarding or rebound. Musculoskeletal:         General: Swelling and deformity (Extensive swelling and ecchymosis overlying the distal right femur. Skin is intact) present. No tenderness. Normal range of motion. Cervical back: Normal range of motion and neck supple. Right lower leg: No edema. Left lower leg: No edema. Skin:     General: Skin is warm and dry. Capillary Refill: Capillary refill takes less than 2 seconds. Neurological:      General: No focal deficit present. Mental Status: She is alert and oriented to person, place, and time. Cranial Nerves: No cranial nerve deficit. Motor: No weakness. MEDICAL DECISION MAKING   Initial Assessment:   78-year-old female presenting with right distal femur fracture following a fall. Differential diagnosis includes but is not limited to:  Fracture      Plan:   Pain management. Consult Ortho.         ED RESULTS   Laboratory results:  Labs Reviewed   CBC WITH AUTO DIFFERENTIAL - Abnormal; Notable for the following components:       Result Value    WBC 11.7 (*)     Hemoglobin 16.2 (*)     Hematocrit 48.1 (*)     RDW-SD 45.4 (*)     Platelets 000 (*)     Segs Absolute 7.8 (*)     Immature Grans (Abs) 0.12 (*)     All other components within normal limits   BASIC METABOLIC PANEL W/ REFLEX TO MG FOR LOW K - Abnormal; Notable for the following components:    CO2 22 (*)     Glucose 159 (*)     All other components within normal limits   HEPATIC FUNCTION PANEL - Abnormal; Notable for the following components:    AST 51 (*)     ALT 82 (*)     All other components within normal limits   ANION GAP   GLOMERULAR FILTRATION RATE, ESTIMATED   TYPE AND SCREEN   PREPARE RBC (CROSSMATCH)    Narrative:     [id number]     selected  [id number]     selected       Radiologic studies results:  XR KNEE RIGHT (1-2 VIEWS)   Final Result   Markedly displaced distal femoral fracture, improved from earlier study. **This report has been created using voice recognition software. It may contain minor errors which are inherent in voice recognition technology. **      Final report electronically signed by Dr. Blair Capps on 11/6/2021 1:18 PM      XR ANKLE RIGHT (2 VIEWS)   Final Result   Nondisplaced oblique fracture distal fibular shaft. **This report has been created using voice recognition software. It may contain minor errors which are inherent in voice recognition technology. **      Final report electronically signed by Dr. Blair Capps on 11/6/2021 1:20 PM      XR KNEE RIGHT (1-2 VIEWS)   Final Result    Acute fracture of the distal right femur just above the right knee prosthesis. There is significant angulation and displacement. **This report has been created using voice recognition software. It may contain minor errors which are inherent in voice recognition technology. **      Final report electronically signed by Dr. Leah Sullivan on 11/6/2021 10:14 AM      CT FEMUR RIGHT WO CONTRAST    (Results Pending)   CT TIBIA FIBULA RIGHT WO CONTRAST    (Results Pending)   CT RECONSTRUCTION WO POST PROCESS    (Results Pending)       ED Medications administered this visit:   Medications   diphenhydrAMINE (BENADRYL) 50 MG/ML injection (has no administration in time range)   diphenhydrAMINE (BENADRYL) injection 25 mg (has no administration in time range)   0.9 % sodium chloride infusion (has no administration in time range) sodium chloride flush 0.9 % injection 5-40 mL (has no administration in time range)   sodium chloride flush 0.9 % injection 5-40 mL (has no administration in time range)   0.9 % sodium chloride infusion (has no administration in time range)   morphine (PF) injection 2 mg (has no administration in time range)     Or   morphine injection 4 mg (has no administration in time range)   ondansetron (ZOFRAN-ODT) disintegrating tablet 4 mg (has no administration in time range)     Or   ondansetron (ZOFRAN) injection 4 mg (has no administration in time range)   ceFAZolin (ANCEF) 2000 mg in dextrose 4 % 100 mL IVPB (premix) (has no administration in time range)   oxyCODONE-acetaminophen (PERCOCET) 5-325 MG per tablet 1 tablet (has no administration in time range)   oxyCODONE-acetaminophen (PERCOCET) 5-325 MG per tablet 2 tablet (has no administration in time range)   ondansetron (ZOFRAN) injection 4 mg (4 mg IntraVENous Given 11/6/21 1002)   HYDROmorphone (DILAUDID) injection 1 mg (1 mg IntraVENous Given 11/6/21 1002)   diphenhydrAMINE (BENADRYL) injection 50 mg (50 mg IntraVENous Given 11/6/21 1002)   fentaNYL (SUBLIMAZE) injection 50 mcg (50 mcg IntraVENous Given 11/6/21 1049)   fentaNYL (SUBLIMAZE) injection 50 mcg (50 mcg IntraVENous Given 11/6/21 1135)   HYDROmorphone (DILAUDID) injection 0.5 mg (0.5 mg IntraVENous Given 11/6/21 1207)   ondansetron (ZOFRAN) injection 4 mg (4 mg IntraVENous Given 11/6/21 1208)   LORazepam (ATIVAN) injection 1 mg (1 mg IntraVENous Given 11/6/21 1208)         ED COURSE     ED Course as of 11/06/21 1346   Sat Nov 06, 2021   1042 XR KNEE RIGHT (1-2 VIEWS) [TM]      ED Course User Index  [TM] Xiomara Newman MD           MEDICATION CHANGES     Current Discharge Medication List            FINAL DISPOSITION     Final diagnoses:   Closed displaced fracture of distal epiphysis of right femur, initial encounter ([facility]   Closed fracture of distal end of right fibula, unspecified fracture morphology, initial encounter     Condition: condition: serious  Dispo: Admit to med/surg floor      This transcription was electronically signed. Parts of this transcriptions may have been dictated by use of voice recognition software and electronically transcribed, and parts may have been transcribed with the assistance of an ED scribe. The transcription may contain errors not detected in proofreading. Please refer to my supervising physician's documentation if my documentation differs.     Electronically Signed: Suzan Ruiz MD, 11/06/21, 1:46 PM         Ashlyn Sheriff MD  Resident  11/06/21 2183

## 2021-11-06 NOTE — ED NOTES
Patient to the ED via EMS for evaluation of a knee injury. Patient states that she was at a children's sporting event today when she tripped and fell on bleachers and landed on her right knee. She states that she had sudden severe pain in her right knee. Patient now unable to ambulate. Patient states that she is extremely anxious due to her injury. Patient tearful at this time. Patient is resting in bed with easy and unlabored respirations. Right lower extremity propped up for support. Call light in reach. Side rails up x2. Patient denies further complaints or concerns. Will monitor.         Cheryl Phelps RN  11/06/21 1100

## 2021-11-07 ENCOUNTER — APPOINTMENT (OUTPATIENT)
Dept: GENERAL RADIOLOGY | Age: 51
DRG: 481 | End: 2021-11-07
Payer: COMMERCIAL

## 2021-11-07 ENCOUNTER — ANESTHESIA (OUTPATIENT)
Dept: OPERATING ROOM | Age: 51
DRG: 481 | End: 2021-11-07
Payer: COMMERCIAL

## 2021-11-07 ENCOUNTER — ANESTHESIA EVENT (OUTPATIENT)
Dept: OPERATING ROOM | Age: 51
DRG: 481 | End: 2021-11-07
Payer: COMMERCIAL

## 2021-11-07 VITALS — OXYGEN SATURATION: 89 % | DIASTOLIC BLOOD PRESSURE: 76 MMHG | SYSTOLIC BLOOD PRESSURE: 136 MMHG

## 2021-11-07 PROBLEM — R03.0 ELEVATED BLOOD PRESSURE READING: Status: ACTIVE | Noted: 2021-11-06

## 2021-11-07 LAB
ANION GAP SERPL CALCULATED.3IONS-SCNC: 12 MEQ/L (ref 8–16)
BUN BLDV-MCNC: 8 MG/DL (ref 7–22)
CALCIUM SERPL-MCNC: 9 MG/DL (ref 8.5–10.5)
CHLORIDE BLD-SCNC: 103 MEQ/L (ref 98–111)
CO2: 24 MEQ/L (ref 23–33)
CREAT SERPL-MCNC: 0.5 MG/DL (ref 0.4–1.2)
GFR SERPL CREATININE-BSD FRML MDRD: > 90 ML/MIN/1.73M2
GLUCOSE BLD-MCNC: 133 MG/DL (ref 70–108)
GLUCOSE BLD-MCNC: 153 MG/DL (ref 70–108)
GLUCOSE BLD-MCNC: 154 MG/DL (ref 70–108)
GLUCOSE BLD-MCNC: 182 MG/DL (ref 70–108)
GLUCOSE BLD-MCNC: 185 MG/DL (ref 70–108)
GLUCOSE BLD-MCNC: 202 MG/DL (ref 70–108)
HCT VFR BLD CALC: 45.6 % (ref 37–47)
HEMOGLOBIN: 15 GM/DL (ref 12–16)
POTASSIUM SERPL-SCNC: 4.6 MEQ/L (ref 3.5–5.2)
SODIUM BLD-SCNC: 139 MEQ/L (ref 135–145)

## 2021-11-07 PROCEDURE — C1713 ANCHOR/SCREW BN/BN,TIS/BN: HCPCS | Performed by: ORTHOPAEDIC SURGERY

## 2021-11-07 PROCEDURE — 6360000002 HC RX W HCPCS: Performed by: ORTHOPAEDIC SURGERY

## 2021-11-07 PROCEDURE — 6370000000 HC RX 637 (ALT 250 FOR IP): Performed by: ORTHOPAEDIC SURGERY

## 2021-11-07 PROCEDURE — 2580000003 HC RX 258: Performed by: ORTHOPAEDIC SURGERY

## 2021-11-07 PROCEDURE — 3700000000 HC ANESTHESIA ATTENDED CARE: Performed by: ORTHOPAEDIC SURGERY

## 2021-11-07 PROCEDURE — 80048 BASIC METABOLIC PNL TOTAL CA: CPT

## 2021-11-07 PROCEDURE — 82948 REAGENT STRIP/BLOOD GLUCOSE: CPT

## 2021-11-07 PROCEDURE — 6360000002 HC RX W HCPCS: Performed by: PHYSICIAN ASSISTANT

## 2021-11-07 PROCEDURE — 99233 SBSQ HOSP IP/OBS HIGH 50: CPT | Performed by: HOSPITALIST

## 2021-11-07 PROCEDURE — 7100000001 HC PACU RECOVERY - ADDTL 15 MIN: Performed by: ORTHOPAEDIC SURGERY

## 2021-11-07 PROCEDURE — 3600000004 HC SURGERY LEVEL 4 BASE: Performed by: ORTHOPAEDIC SURGERY

## 2021-11-07 PROCEDURE — 3700000001 HC ADD 15 MINUTES (ANESTHESIA): Performed by: ORTHOPAEDIC SURGERY

## 2021-11-07 PROCEDURE — 0QSB04Z REPOSITION RIGHT LOWER FEMUR WITH INTERNAL FIXATION DEVICE, OPEN APPROACH: ICD-10-PCS | Performed by: ORTHOPAEDIC SURGERY

## 2021-11-07 PROCEDURE — 6370000000 HC RX 637 (ALT 250 FOR IP): Performed by: HOSPITALIST

## 2021-11-07 PROCEDURE — 1200000000 HC SEMI PRIVATE

## 2021-11-07 PROCEDURE — 7100000000 HC PACU RECOVERY - FIRST 15 MIN: Performed by: ORTHOPAEDIC SURGERY

## 2021-11-07 PROCEDURE — 2709999900 HC NON-CHARGEABLE SUPPLY: Performed by: ORTHOPAEDIC SURGERY

## 2021-11-07 PROCEDURE — 73552 X-RAY EXAM OF FEMUR 2/>: CPT

## 2021-11-07 PROCEDURE — 2720000010 HC SURG SUPPLY STERILE: Performed by: ORTHOPAEDIC SURGERY

## 2021-11-07 PROCEDURE — 85018 HEMOGLOBIN: CPT

## 2021-11-07 PROCEDURE — 3209999900 FLUORO FOR SURGICAL PROCEDURES

## 2021-11-07 PROCEDURE — 6360000002 HC RX W HCPCS: Performed by: NURSE ANESTHETIST, CERTIFIED REGISTERED

## 2021-11-07 PROCEDURE — 85014 HEMATOCRIT: CPT

## 2021-11-07 PROCEDURE — 3600000014 HC SURGERY LEVEL 4 ADDTL 15MIN: Performed by: ORTHOPAEDIC SURGERY

## 2021-11-07 PROCEDURE — 36415 COLL VENOUS BLD VENIPUNCTURE: CPT

## 2021-11-07 PROCEDURE — 2500000003 HC RX 250 WO HCPCS: Performed by: NURSE ANESTHETIST, CERTIFIED REGISTERED

## 2021-11-07 PROCEDURE — 2500000003 HC RX 250 WO HCPCS: Performed by: ORTHOPAEDIC SURGERY

## 2021-11-07 DEVICE — PERI-LOC 4.5MM T25 LOCKING SCREW                                    30MM SELF-TAPPING
Type: IMPLANTABLE DEVICE | Status: FUNCTIONAL
Brand: PERI-LOC

## 2021-11-07 DEVICE — PERI-LOC 4.5MM T25 CORTEX SCREW                                    32MM SELF-TAPPING
Type: IMPLANTABLE DEVICE | Status: FUNCTIONAL
Brand: PERI-LOC

## 2021-11-07 DEVICE — PERI-LOC 4.5MM T25 CORTEX SCREW                                    40MM SELF-TAPPING
Type: IMPLANTABLE DEVICE | Status: FUNCTIONAL
Brand: PERI-LOC

## 2021-11-07 DEVICE — PERI-LOC 4.5MM T25 CORTEX SCREW                                    34MM SELF-TAPPING
Type: IMPLANTABLE DEVICE | Status: FUNCTIONAL
Brand: PERI-LOC

## 2021-11-07 DEVICE — 4.5MM LATERAL DISTAL FEMUR LOCKING                                    PLATE 13 HOLE RIGHT 286MM
Type: IMPLANTABLE DEVICE | Status: FUNCTIONAL
Brand: PERI-LOC

## 2021-11-07 DEVICE — PERI-LOC 4.5MM T25 LOCKING SCREW                                    76MM SELF-TAPPING
Type: IMPLANTABLE DEVICE | Status: FUNCTIONAL
Brand: PERI-LOC

## 2021-11-07 DEVICE — PERI-LOC 4.5MM T25 LOCKING SCREW                                    44MM SELF-TAPPING
Type: IMPLANTABLE DEVICE | Status: FUNCTIONAL
Brand: PERI-LOC

## 2021-11-07 RX ORDER — ROPINIROLE 1 MG/1
1 TABLET, FILM COATED ORAL NIGHTLY
Status: DISCONTINUED | OUTPATIENT
Start: 2021-11-07 | End: 2021-11-09 | Stop reason: HOSPADM

## 2021-11-07 RX ORDER — ONDANSETRON 2 MG/ML
INJECTION INTRAMUSCULAR; INTRAVENOUS PRN
Status: DISCONTINUED | OUTPATIENT
Start: 2021-11-07 | End: 2021-11-07 | Stop reason: SDUPTHER

## 2021-11-07 RX ORDER — DEXAMETHASONE SODIUM PHOSPHATE 10 MG/ML
INJECTION, EMULSION INTRAMUSCULAR; INTRAVENOUS PRN
Status: DISCONTINUED | OUTPATIENT
Start: 2021-11-07 | End: 2021-11-07 | Stop reason: SDUPTHER

## 2021-11-07 RX ORDER — BACTERIOSTATIC SODIUM CHLORIDE 0.9 %
VIAL (ML) INJECTION PRN
Status: DISCONTINUED | OUTPATIENT
Start: 2021-11-07 | End: 2021-11-07 | Stop reason: ALTCHOICE

## 2021-11-07 RX ORDER — FENTANYL CITRATE 50 UG/ML
50 INJECTION, SOLUTION INTRAMUSCULAR; INTRAVENOUS EVERY 5 MIN PRN
Status: DISCONTINUED | OUTPATIENT
Start: 2021-11-07 | End: 2021-11-07 | Stop reason: HOSPADM

## 2021-11-07 RX ORDER — LABETALOL 20 MG/4 ML (5 MG/ML) INTRAVENOUS SYRINGE
5 EVERY 10 MIN PRN
Status: DISCONTINUED | OUTPATIENT
Start: 2021-11-07 | End: 2021-11-07 | Stop reason: HOSPADM

## 2021-11-07 RX ORDER — BUPIVACAINE HYDROCHLORIDE 5 MG/ML
INJECTION, SOLUTION EPIDURAL; INTRACAUDAL PRN
Status: DISCONTINUED | OUTPATIENT
Start: 2021-11-07 | End: 2021-11-07 | Stop reason: ALTCHOICE

## 2021-11-07 RX ORDER — PROPOFOL 10 MG/ML
INJECTION, EMULSION INTRAVENOUS PRN
Status: DISCONTINUED | OUTPATIENT
Start: 2021-11-07 | End: 2021-11-07 | Stop reason: SDUPTHER

## 2021-11-07 RX ORDER — MORPHINE SULFATE 10 MG/ML
INJECTION, SOLUTION INTRAMUSCULAR; INTRAVENOUS PRN
Status: DISCONTINUED | OUTPATIENT
Start: 2021-11-07 | End: 2021-11-07 | Stop reason: ALTCHOICE

## 2021-11-07 RX ORDER — CEFAZOLIN SODIUM 1 G/3ML
INJECTION, POWDER, FOR SOLUTION INTRAMUSCULAR; INTRAVENOUS PRN
Status: DISCONTINUED | OUTPATIENT
Start: 2021-11-07 | End: 2021-11-07 | Stop reason: SDUPTHER

## 2021-11-07 RX ORDER — HYDROMORPHONE HCL 110MG/55ML
PATIENT CONTROLLED ANALGESIA SYRINGE INTRAVENOUS PRN
Status: DISCONTINUED | OUTPATIENT
Start: 2021-11-07 | End: 2021-11-07 | Stop reason: SDUPTHER

## 2021-11-07 RX ORDER — ROCURONIUM BROMIDE 10 MG/ML
INJECTION, SOLUTION INTRAVENOUS PRN
Status: DISCONTINUED | OUTPATIENT
Start: 2021-11-07 | End: 2021-11-07 | Stop reason: SDUPTHER

## 2021-11-07 RX ORDER — OXYCODONE HYDROCHLORIDE AND ACETAMINOPHEN 5; 325 MG/1; MG/1
1-2 TABLET ORAL EVERY 4 HOURS PRN
Qty: 50 TABLET | Refills: 0 | Status: SHIPPED | OUTPATIENT
Start: 2021-11-07 | End: 2022-01-13 | Stop reason: SDUPTHER

## 2021-11-07 RX ORDER — ONDANSETRON 4 MG/1
4 TABLET, FILM COATED ORAL DAILY PRN
Qty: 30 TABLET | Refills: 0 | Status: SHIPPED | OUTPATIENT
Start: 2021-11-07 | End: 2022-05-09 | Stop reason: SDUPTHER

## 2021-11-07 RX ORDER — MIDAZOLAM HYDROCHLORIDE 1 MG/ML
INJECTION INTRAMUSCULAR; INTRAVENOUS PRN
Status: DISCONTINUED | OUTPATIENT
Start: 2021-11-07 | End: 2021-11-07 | Stop reason: SDUPTHER

## 2021-11-07 RX ORDER — MEPERIDINE HYDROCHLORIDE 25 MG/ML
12.5 INJECTION INTRAMUSCULAR; INTRAVENOUS; SUBCUTANEOUS EVERY 5 MIN PRN
Status: DISCONTINUED | OUTPATIENT
Start: 2021-11-07 | End: 2021-11-07 | Stop reason: HOSPADM

## 2021-11-07 RX ORDER — KETOROLAC TROMETHAMINE 30 MG/ML
INJECTION, SOLUTION INTRAMUSCULAR; INTRAVENOUS PRN
Status: DISCONTINUED | OUTPATIENT
Start: 2021-11-07 | End: 2021-11-07 | Stop reason: ALTCHOICE

## 2021-11-07 RX ORDER — PAROXETINE HYDROCHLORIDE 20 MG/1
40 TABLET, FILM COATED ORAL DAILY
Status: DISCONTINUED | OUTPATIENT
Start: 2021-11-07 | End: 2021-11-09 | Stop reason: HOSPADM

## 2021-11-07 RX ORDER — LIDOCAINE HYDROCHLORIDE 20 MG/ML
INJECTION, SOLUTION INTRAVENOUS PRN
Status: DISCONTINUED | OUTPATIENT
Start: 2021-11-07 | End: 2021-11-07 | Stop reason: SDUPTHER

## 2021-11-07 RX ORDER — SUCCINYLCHOLINE/SOD CL,ISO/PF 200MG/10ML
SYRINGE (ML) INTRAVENOUS PRN
Status: DISCONTINUED | OUTPATIENT
Start: 2021-11-07 | End: 2021-11-07 | Stop reason: SDUPTHER

## 2021-11-07 RX ORDER — RIVAROXABAN 10 MG/1
10 TABLET, FILM COATED ORAL EVERY 24 HOURS
Qty: 18 TABLET | Refills: 0 | Status: SHIPPED | OUTPATIENT
Start: 2021-11-07 | End: 2021-12-27

## 2021-11-07 RX ORDER — FENTANYL CITRATE 50 UG/ML
INJECTION, SOLUTION INTRAMUSCULAR; INTRAVENOUS PRN
Status: DISCONTINUED | OUTPATIENT
Start: 2021-11-07 | End: 2021-11-07 | Stop reason: SDUPTHER

## 2021-11-07 RX ORDER — ONDANSETRON 2 MG/ML
4 INJECTION INTRAMUSCULAR; INTRAVENOUS
Status: DISCONTINUED | OUTPATIENT
Start: 2021-11-07 | End: 2021-11-07 | Stop reason: HOSPADM

## 2021-11-07 RX ADMIN — INSULIN LISPRO 1 UNITS: 100 INJECTION, SOLUTION INTRAVENOUS; SUBCUTANEOUS at 16:51

## 2021-11-07 RX ADMIN — HYDROMORPHONE HYDROCHLORIDE 0.5 MG: 1 INJECTION, SOLUTION INTRAMUSCULAR; INTRAVENOUS; SUBCUTANEOUS at 05:47

## 2021-11-07 RX ADMIN — FENTANYL CITRATE 100 MCG: 50 INJECTION, SOLUTION INTRAMUSCULAR; INTRAVENOUS at 07:49

## 2021-11-07 RX ADMIN — PROPOFOL 150 MG: 10 INJECTION, EMULSION INTRAVENOUS at 07:52

## 2021-11-07 RX ADMIN — OXYCODONE AND ACETAMINOPHEN 2 TABLET: 5; 325 TABLET ORAL at 23:30

## 2021-11-07 RX ADMIN — PROPOFOL 50 MG: 10 INJECTION, EMULSION INTRAVENOUS at 08:10

## 2021-11-07 RX ADMIN — SODIUM CHLORIDE: 9 INJECTION, SOLUTION INTRAVENOUS at 01:01

## 2021-11-07 RX ADMIN — OXYCODONE AND ACETAMINOPHEN 2 TABLET: 5; 325 TABLET ORAL at 12:17

## 2021-11-07 RX ADMIN — HYDROMORPHONE HYDROCHLORIDE 1 MG: 2 INJECTION INTRAMUSCULAR; INTRAVENOUS; SUBCUTANEOUS at 08:10

## 2021-11-07 RX ADMIN — HYDROMORPHONE HYDROCHLORIDE 0.5 MG: 1 INJECTION, SOLUTION INTRAMUSCULAR; INTRAVENOUS; SUBCUTANEOUS at 21:35

## 2021-11-07 RX ADMIN — Medication 160 MG: at 07:52

## 2021-11-07 RX ADMIN — RIVAROXABAN 10 MG: 10 TABLET, FILM COATED ORAL at 16:58

## 2021-11-07 RX ADMIN — SODIUM CHLORIDE: 9 INJECTION, SOLUTION INTRAVENOUS at 12:14

## 2021-11-07 RX ADMIN — PAROXETINE HYDROCHLORIDE 40 MG: 20 TABLET, FILM COATED ORAL at 23:31

## 2021-11-07 RX ADMIN — CEFAZOLIN 2000 MG: 10 INJECTION, POWDER, FOR SOLUTION INTRAVENOUS at 16:50

## 2021-11-07 RX ADMIN — ONDANSETRON HYDROCHLORIDE 4 MG: 4 INJECTION, SOLUTION INTRAMUSCULAR; INTRAVENOUS at 08:08

## 2021-11-07 RX ADMIN — MORPHINE SULFATE 2 MG: 2 INJECTION, SOLUTION INTRAMUSCULAR; INTRAVENOUS at 15:53

## 2021-11-07 RX ADMIN — HYDROMORPHONE HYDROCHLORIDE 0.5 MG: 1 INJECTION, SOLUTION INTRAMUSCULAR; INTRAVENOUS; SUBCUTANEOUS at 03:49

## 2021-11-07 RX ADMIN — MIDAZOLAM 2 MG: 1 INJECTION INTRAMUSCULAR; INTRAVENOUS at 07:49

## 2021-11-07 RX ADMIN — SUGAMMADEX 200 MG: 100 INJECTION, SOLUTION INTRAVENOUS at 08:52

## 2021-11-07 RX ADMIN — LIDOCAINE HYDROCHLORIDE 100 MG: 20 INJECTION, SOLUTION INTRAVENOUS at 07:52

## 2021-11-07 RX ADMIN — DEXAMETHASONE SODIUM PHOSPHATE 10 MG: 10 INJECTION, EMULSION INTRAMUSCULAR; INTRAVENOUS at 08:08

## 2021-11-07 RX ADMIN — INSULIN LISPRO 2 UNITS: 100 INJECTION, SOLUTION INTRAVENOUS; SUBCUTANEOUS at 12:11

## 2021-11-07 RX ADMIN — CEFAZOLIN 2000 MG: 1 INJECTION, POWDER, FOR SOLUTION INTRAMUSCULAR; INTRAVENOUS at 08:01

## 2021-11-07 RX ADMIN — OXYCODONE AND ACETAMINOPHEN 2 TABLET: 5; 325 TABLET ORAL at 02:22

## 2021-11-07 RX ADMIN — HYDROMORPHONE HYDROCHLORIDE 0.5 MG: 1 INJECTION, SOLUTION INTRAMUSCULAR; INTRAVENOUS; SUBCUTANEOUS at 10:29

## 2021-11-07 RX ADMIN — SODIUM CHLORIDE: 9 INJECTION, SOLUTION INTRAVENOUS at 08:15

## 2021-11-07 RX ADMIN — ROPINIROLE HYDROCHLORIDE 1 MG: 1 TABLET, FILM COATED ORAL at 23:31

## 2021-11-07 RX ADMIN — ROCURONIUM BROMIDE 50 MG: 10 INJECTION INTRAVENOUS at 08:00

## 2021-11-07 ASSESSMENT — PULMONARY FUNCTION TESTS
PIF_VALUE: 20
PIF_VALUE: 20
PIF_VALUE: 4
PIF_VALUE: 21
PIF_VALUE: 10
PIF_VALUE: 16
PIF_VALUE: 23
PIF_VALUE: 32
PIF_VALUE: 4
PIF_VALUE: 47
PIF_VALUE: 23
PIF_VALUE: 20
PIF_VALUE: 21
PIF_VALUE: 23
PIF_VALUE: 44
PIF_VALUE: 7
PIF_VALUE: 22
PIF_VALUE: 23
PIF_VALUE: 22
PIF_VALUE: 20
PIF_VALUE: 22
PIF_VALUE: 20
PIF_VALUE: 22
PIF_VALUE: 22
PIF_VALUE: 21
PIF_VALUE: 22
PIF_VALUE: 22
PIF_VALUE: 21
PIF_VALUE: 21
PIF_VALUE: 4
PIF_VALUE: 36
PIF_VALUE: 22
PIF_VALUE: 24
PIF_VALUE: 5
PIF_VALUE: 23
PIF_VALUE: 21
PIF_VALUE: 22
PIF_VALUE: 2
PIF_VALUE: 22
PIF_VALUE: 21
PIF_VALUE: 23
PIF_VALUE: 20
PIF_VALUE: 23
PIF_VALUE: 21
PIF_VALUE: 19
PIF_VALUE: 21
PIF_VALUE: 4
PIF_VALUE: 21
PIF_VALUE: 22
PIF_VALUE: 7
PIF_VALUE: 6
PIF_VALUE: 4
PIF_VALUE: 23
PIF_VALUE: 22
PIF_VALUE: 23
PIF_VALUE: 5
PIF_VALUE: 21
PIF_VALUE: 23
PIF_VALUE: 7
PIF_VALUE: 21
PIF_VALUE: 22
PIF_VALUE: 22
PIF_VALUE: 4
PIF_VALUE: 23
PIF_VALUE: 23
PIF_VALUE: 22

## 2021-11-07 ASSESSMENT — PAIN DESCRIPTION - ORIENTATION
ORIENTATION: LEFT;UPPER
ORIENTATION: RIGHT
ORIENTATION: LEFT;UPPER

## 2021-11-07 ASSESSMENT — PAIN SCALES - GENERAL
PAINLEVEL_OUTOF10: 8
PAINLEVEL_OUTOF10: 5
PAINLEVEL_OUTOF10: 8
PAINLEVEL_OUTOF10: 6
PAINLEVEL_OUTOF10: 4
PAINLEVEL_OUTOF10: 5
PAINLEVEL_OUTOF10: 8
PAINLEVEL_OUTOF10: 8
PAINLEVEL_OUTOF10: 4
PAINLEVEL_OUTOF10: 8
PAINLEVEL_OUTOF10: 10
PAINLEVEL_OUTOF10: 5

## 2021-11-07 ASSESSMENT — PAIN DESCRIPTION - PAIN TYPE
TYPE: SURGICAL PAIN
TYPE: SURGICAL PAIN;ACUTE PAIN
TYPE: SURGICAL PAIN
TYPE: SURGICAL PAIN;ACUTE PAIN

## 2021-11-07 ASSESSMENT — PAIN DESCRIPTION - LOCATION
LOCATION: LEG

## 2021-11-07 ASSESSMENT — PAIN DESCRIPTION - FREQUENCY: FREQUENCY: CONTINUOUS

## 2021-11-07 ASSESSMENT — PAIN DESCRIPTION - ONSET: ONSET: ON-GOING

## 2021-11-07 ASSESSMENT — PAIN DESCRIPTION - DESCRIPTORS
DESCRIPTORS: BURNING

## 2021-11-07 ASSESSMENT — PAIN DESCRIPTION - PROGRESSION: CLINICAL_PROGRESSION: RAPIDLY WORSENING

## 2021-11-07 ASSESSMENT — PAIN - FUNCTIONAL ASSESSMENT: PAIN_FUNCTIONAL_ASSESSMENT: PREVENTS OR INTERFERES WITH MANY ACTIVE NOT PASSIVE ACTIVITIES

## 2021-11-07 NOTE — ANESTHESIA PRE PROCEDURE
Department of Anesthesiology  Preprocedure Note       Name:  Bobo Young   Age:  44 y.o.  :  1970                                          MRN:  [id number]         Date:  2021      Surgeon: Katja Grayson):  Marii Cantu MD    Procedure: Procedure(s): FEMUR OPEN REDUCTION INTERNAL FIXATION RIGHT DISTAL RADIUS    Medications prior to admission:   Prior to Admission medications    Medication Sig Start Date End Date Taking?  Authorizing Provider   Turmeric 400 MG CAPS Take by mouth   Yes Historical Provider, MD   [place]-3 1000 MG CAPS Take by mouth daily   Yes Historical Provider, MD   vitamin D (CHOLECALCIFEROL) 125 MCG (5000 UT) CAPS capsule Take 1 capsule by mouth daily 21  Yes Lynette Sellers MD   PARoxetine (PAXIL) 40 MG tablet Take 1 tablet by mouth daily 21  Yes Lynette Sellers MD   metFORMIN (GLUCOPHAGE-XR) 500 MG extended release tablet Take 2 tablets by mouth 2 times daily take 2 tablets by mouth every morning  Patient taking differently: Take 1,000 mg by mouth every evening take 4 tablets by mouth every morning 20  Yes JERI Buchanan CNP   rOPINIRole (REQUIP) 0.5 MG tablet Take 2 tablets by mouth nightly 12/10/20  Yes JERI Buchanan CNP   vitamin C (ASCORBIC ACID) 500 MG tablet Take 500 mg by mouth daily   Yes Historical Provider, MD   ZINC PO Take 1 tablet by mouth daily   Yes Historical Provider, MD   magnesium oxide (MAG-OX) 400 MG tablet Take 400 mg by mouth daily   Yes Historical Provider, MD   Glucosamine-Chondroitin (GLUCOSAMINE CHONDR COMPLEX PO) Take by mouth daily    Yes Historical Provider, MD   vitamin E 400 UNIT capsule Take 400 Units by mouth 2 times daily   Yes Historical Provider, MD   letrozole [facility]) 2.5 MG tablet Take 1 tablet by mouth every other day 21   Char De La Cruz PA-C   promethazine (PHENERGAN) 25 MG tablet Take 1 tablet by mouth every 8 hours as needed for Nausea 9/3/21   Lynette Sellers MD   mometasone (ELOCON) 0.1 % cream Apply topically daily.  9/3/21   Suad Hussein MD   diclofenac sodium (VOLTAREN) 1 % GEL Apply 2 g topically 4 times daily 2/17/21   JERI Otoole CNP   cyclobenzaprine (FLEXERIL) 10 MG tablet take 1 tablet by mouth twice a day if needed for muscle spasm 2/11/21   Suad Hussein MD   ondansetron [facility]) 4 MG tablet Take 1 tablet by mouth every 8 hours as needed for Nausea 2/11/21   Suad Hussein MD   loratadine (CLARITIN) 10 MG tablet take 1 tablet by mouth once daily 12/21/20   Temple SydneyJERI CNP   fluticasone (FLONASE) 50 MCG/ACT nasal spray 1 spray by Nasal route daily 12/10/20   Temple SydneyJERI - CNP   albuterol sulfate HFA (PROAIR HFA) 108 (90 Base) MCG/ACT inhaler Inhale 2 puffs into the lungs every 6 hours as needed for Wheezing 12/9/19   JERI Otoole CNP   Glucose Blood (BLOOD GLUCOSE TEST STRIPS) STRP Contour EZ test strips and lancets test daily E11.9 5/7/18   JERI Otoole CNP       Current medications:    Current Facility-Administered Medications   Medication Dose Route Frequency Provider Last Rate Last Admin    diphenhydrAMINE (BENADRYL) injection 25 mg  25 mg IntraVENous Once Leroy Williamson MD        0.9 % sodium chloride infusion   IntraVENous Continuous Roberta Sorenson  mL/hr at 11/07/21 0101 New Bag at 11/07/21 0101    sodium chloride flush 0.9 % injection 5-40 mL  5-40 mL IntraVENous 2 times per day Roberta Sorenson MD        sodium chloride flush 0.9 % injection 5-40 mL  5-40 mL IntraVENous PRN Roberta Sorenson MD   10 mL at 11/06/21 1400    0.9 % sodium chloride infusion  25 mL IntraVENous PRN Roberta Sorenson MD        morphine (PF) injection 2 mg  2 mg IntraVENous Q2H PRN Roberta Sorenson MD        ondansetron (ZOFRAN-ODT) disintegrating tablet 4 mg  4 mg Oral Q8H PRN Roberta Sorenson MD        Or    ondansetron (ZOFRAN) injection 4 mg  4 mg IntraVENous Q6H PRN Roberta Sorenson MD        oxyCODONE-acetaminophen (PERCOCET) 5-325 MG per tablet 1 tablet  1 tablet Oral Q4H PRN Manny Nyhan, MD        oxyCODONE-acetaminophen (PERCOCET) 5-325 MG per tablet 2 tablet  2 tablet Oral Q4H PRN Manny Nyhan, MD   2 tablet at 11/07/21 0222    glucose (GLUTOSE) 40 % oral gel 15 g  15 g Oral PRN Priscille Lorenz, DO        dextrose 50 % IV solution  12.5 g IntraVENous PRN Priscille Lorenz, DO        glucagon (rDNA) injection 1 mg  1 mg IntraMUSCular PRN Priscille Lorenz, DO        dextrose 5 % solution  100 mL/hr IntraVENous PRN Priscille Lorenz, DO        insulin lispro (HUMALOG) injection vial 0-6 Units  0-6 Units SubCUTAneous TID WC Dallas Cohen, DO        insulin lispro (HUMALOG) injection vial 0-3 Units  0-3 Units SubCUTAneous Nightly Priscille Lorenz, DO        hydrALAZINE (APRESOLINE) tablet 25 mg  25 mg Oral 3 times per day Priscille Lorenz, DO   25 mg at 11/06/21 1624    HYDROmorphone (DILAUDID) injection 0.5 mg  0.5 mg IntraVENous Q2H PRN Scot Bryan PA-C   0.5 mg at 11/07/21 0547    acetaminophen (TYLENOL) tablet 500 mg  500 mg Oral Q6H Scot Bryan PA-C   500 mg at 11/06/21 2345    cyclobenzaprine (FLEXERIL) tablet 10 mg  10 mg Oral TID PRN Trinity Liao PA-C   10 mg at 11/06/21 2345       Allergies:     Allergies   Allergen Reactions    Sulfa Antibiotics Itching    Adhesive Tape Rash and Other (See Comments)     Blisters with prolonged use    Hydrocodone-Acetaminophen Nausea And Vomiting    Vicodin [Hydrocodone-Acetaminophen] Nausea And Vomiting       Problem List:    Patient Active Problem List   Diagnosis Code    Type 2 diabetes mellitus without complication, without long-term current use of insulin (HCC) E11.9    Primary insomnia F51.01    Malignant neoplasm of overlapping sites of left breast in female, estrogen receptor positive ([facility] [address] C50.812, Z17.0    Encounter for monitoring aromatase inhibitor therapy Z51.81, Z79.811    Use of goserelin acetate (Zoladex) Z79.818    Thrombocytosis D75.839    DUB (dysfunctional uterine bleeding) N93.8    Magda-prosthetic supracondylar fracture of femur M97. Verenice Macdonald, [id number]    Primary hypertension I10       Past Medical History:        Diagnosis Date    Arthritis     Breast cancer ([address]     H/O bladder infections     History of stomach ulcers     Knee cartilage, torn, left     PONV (postoperative nausea and vomiting)     Type II or unspecified type diabetes mellitus without mention of complication, not stated as uncontrolled        Past Surgical History:        Procedure Laterality Date    COLONOSCOPY      DILATION AND CURETTAGE OF UTERUS  2017    ENDOMETRIAL ABLATION      GASTRIC BAND  2011    Dr Indigo Hamilton, TOTAL ABDOMINAL  12/15/2020    JOINT REPLACEMENT  12    Right total knee      LYMPHADENECTOMY  10/02/2020    MASTECTOMY Left     SALPINGO-OOPHORECTOMY N/A 12/15/2020    ROBOTIC HYSYER WITH BILATERAL SALPING-OOPHORECTOMY performed by Anjali Rodrigues MD at [address] [place] [address]         Social History:    Social History     Tobacco Use    Smoking status: Former Smoker     Quit date: 2000     Years since quittin.1    Smokeless tobacco: Never Used   Substance Use Topics    Alcohol use: Yes     Comment: rarely                                Counseling given: Not Answered      Vital Signs (Current):   Vitals:    21 1624 21 2030 21 2230 21 0345   BP: (!) 162/80 (!) 155/70 (!) 143/75 (!) 133/58   Pulse: 86 67  70   Resp: 16 16  16   Temp: 98.1 °F (36.7 °C) 98.3 °F (36.8 °C)     TempSrc: Oral Oral     SpO2: 94% 94%  92%   Weight:       Height:                                                  BP Readings from Last 3 Encounters:   21 (!) 133/58   21 (!) 144/89   21 125/89       NPO Status:                                                                                 BMI:   Wt Readings from Last 3 Encounters:   21 237 lb (107.5 kg)   21 228 lb (103.4 (+) obese,     Abdomen: soft. Vascular: negative vascular ROS. Other Findings:             Anesthesia Plan      general     ASA 3       Induction: intravenous. MIPS: Postoperative opioids intended and Prophylactic antiemetics administered. Anesthetic plan and risks discussed with patient, spouse and child/children. Plan discussed with CRNA.                   Kuldeep Palafox DO   11/7/2021

## 2021-11-07 NOTE — H&P
Turmeric 400 MG CAPS Take by mouth   Yes Historical Provider, MD   [place]-3 1000 MG CAPS Take by mouth daily   Yes Historical Provider, MD   vitamin D (CHOLECALCIFEROL) 125 MCG (5000 UT) CAPS capsule Take 1 capsule by mouth daily 2/11/21  Yes Shasha León MD   PARoxetine (PAXIL) 40 MG tablet Take 1 tablet by mouth daily 2/11/21  Yes Shasha León MD   metFORMIN (GLUCOPHAGE-XR) 500 MG extended release tablet Take 2 tablets by mouth 2 times daily take 2 tablets by mouth every morning  Patient taking differently: Take 1,000 mg by mouth every evening take 4 tablets by mouth every morning 12/29/20  Yes JERI Ruvalcaba CNP   rOPINIRole (REQUIP) 0.5 MG tablet Take 2 tablets by mouth nightly 12/10/20  Yes JERI Ruvalcaba CNP   vitamin C (ASCORBIC ACID) 500 MG tablet Take 500 mg by mouth daily   Yes Historical Provider, MD   ZINC PO Take 1 tablet by mouth daily   Yes Historical Provider, MD   magnesium oxide (MAG-OX) 400 MG tablet Take 400 mg by mouth daily   Yes Historical Provider, MD   Glucosamine-Chondroitin (GLUCOSAMINE CHONDR COMPLEX PO) Take by mouth daily    Yes Historical Provider, MD   vitamin E 400 UNIT capsule Take 400 Units by mouth 2 times daily   Yes Historical Provider, MD   letrozole [facility]) 2.5 MG tablet Take 1 tablet by mouth every other day 11/2/21   KIYA DowC   promethazine (PHENERGAN) 25 MG tablet Take 1 tablet by mouth every 8 hours as needed for Nausea 9/3/21   Shasha León MD   mometasone (ELOCON) 0.1 % cream Apply topically daily.  9/3/21   Shasha León MD   diclofenac sodium (VOLTAREN) 1 % GEL Apply 2 g topically 4 times daily 2/17/21   JERI Ruvalcaba CNP   cyclobenzaprine (FLEXERIL) 10 MG tablet take 1 tablet by mouth twice a day if needed for muscle spasm 2/11/21   Shasha León MD   loratadine (CLARITIN) 10 MG tablet take 1 tablet by mouth once daily 12/21/20   JERI Ruvalcaba CNP   fluticasone (FLONASE) 50 MCG/ACT nasal spray 1 spray by  Difficulty of Paying Living Expenses: Not on file   Food Insecurity:     Worried About Running Out of Food in the Last Year: Not on file    Ran Out of Food in the Last Year: Not on file   Transportation Needs:     Lack of Transportation (Medical): Not on file    Lack of Transportation (Non-Medical):  Not on file   Physical Activity:     Days of Exercise per Week: Not on file    Minutes of Exercise per Session: Not on file   Stress:     Feeling of Stress : Not on file   Social Connections:     Frequency of Communication with Friends and Family: Not on file    Frequency of Social Gatherings with Friends and Family: Not on file    Attends Taoism Services: Not on file    Active Member of Clubs or Organizations: Not on file    Attends Club or Organization Meetings: Not on file    Marital Status: Not on file   Intimate Partner Violence:     Fear of Current or Ex-Partner: Not on file    Emotionally Abused: Not on file    Physically Abused: Not on file    Sexually Abused: Not on file   Housing Stability:     Unable to Pay for Housing in the Last Year: Not on file    Number of [place] in the Last Year: Not on file    Unstable Housing in the Last Year: Not on file     Social History     Tobacco Use   Smoking Status Former Smoker    Quit date: 2000    Years since quittin.1   Smokeless Tobacco Never Used     Social History     Substance and Sexual Activity   Alcohol Use Yes    Comment: rarely     Social History     Substance and Sexual Activity   Drug Use No       Family History:  Family History   Problem Relation Age of Onset    Heart Disease Mother         CHF    Cancer Father     COPD Father     Diabetes Father     High Blood Pressure Father     High Cholesterol Father          REVIEW OF SYSTEMS:  Gen: Negative for nausea, vomiting, diarrhea, fever, chills, night sweats  Heart: Negative for HTN, palpitations, chest pain  Lungs: Negative for wheezes, asthma or SOB  GI: Negative for nausea, vomiting  Endo: Negative for diabetes  Heme: Negative for DVT       PHYSICAL EXAM:  Patient Vitals for the past 24 hrs:   BP Temp Temp src Pulse Resp SpO2 Height Weight   11/07/21 0345 (!) 133/58 -- -- 70 16 92 % -- --   11/06/21 2230 (!) 143/75 -- -- -- -- -- -- --   11/06/21 2030 (!) 155/70 98.3 °F (36.8 °C) Oral 67 16 94 % -- --   11/06/21 1624 (!) 162/80 98.1 °F (36.7 °C) Oral 86 16 94 % -- --   11/06/21 1415 (!) 159/78 98 °F (36.7 °C) Oral 88 18 93 % 5' 3\" (1.6 m) 237 lb (107.5 kg)   11/06/21 1159 (!) 172/106 -- -- 65 18 97 % -- --   11/06/21 1052 (!) 173/110 -- -- 60 12 95 % -- 228 lb (103.4 kg)   11/06/21 0935 (!) 174/108 98.6 °F (37 °C) Oral 74 21 97 % -- --     Gen: alert and oriented  Head: normorcephalic, atraumatic  Neck: supple  Heart: RRR  Lungs: No audible wheezes  Abdomen: soft  Pelvis: stable  Extremity skin is intact. Deformity noted to right lower extremity. Sensation peers to be intact good capillary refill. Gait cannot be analyzed. DATA:  CBC:   Lab Results   Component Value Date    WBC 11.7 11/06/2021    HGB 15.0 11/07/2021     11/06/2021     BMP:    Lab Results   Component Value Date     11/07/2021    K 4.6 11/07/2021    K 4.0 11/06/2021     11/07/2021    CO2 24 11/07/2021    BUN 8 11/07/2021    CREATININE 0.5 11/07/2021    CALCIUM 9.0 11/07/2021    GLUCOSE 182 11/07/2021    GLUCOSE 121 03/26/2019     PT/INR:  No results found for: PROTIME, INR  Troponin:  No results found for: [address]    Radiology: X-rays the right distal femur show a supracondylar femur fracture above a total knee replacement significantly displaced. X-rays also demonstrate of the right ankle a posterior malleolus as well as a lateral malleolus nondisplaced.     ASSESSMENT:Active Problems:    Type 2 diabetes mellitus without complication, without long-term current use of insulin (HCC)    Magda-prosthetic supracondylar fracture of femur    Primary hypertension  Resolved Problems:    * No resolved hospital problems. *       PLAN:  1) scheduled for surgery for distal femur. The distal tibia in the form of a posterior malleolus and then a lateral malleolus can be treated nonoperatively nonweightbearing no mobilization will be necessary. The patient was counseled at length about the risks of julianna Covid-19 during their perioperative period and any recovery window from their procedure. The patient was made aware that julianna Covid-19  may worsen their prognosis for recovering from their procedure  and lend to a higher morbidity and/or mortality risk. All material risks, benefits, and reasonable alternatives including postponing the procedure were discussed. The patient does wish to proceed with the procedure at this time.        Cathy Wilkerson MD

## 2021-11-07 NOTE — PROGRESS NOTES
Hospitalist Progress Note    Patient:  Alex Vo      Unit/Bed:7K-24/024-A    YOB: 1970    MRN: [id number]       Acct: [de-identified]     PCP: JERI Pearson CNP    Date of Admission: 11/6/2021      Subjective: Patient seen and examined. Patient states that her pain is much better controlled after surgery. Patient states that she was in significant pain the night before. Patient states that she did not sleep well last night. Patient reports that her hemoglobin A1c is typically less than 7. Patient reports that she has been doing a great job trying to control her carb intake and states that she has cut out most carbs out of her life. Patient was surprised to hear that her hemoglobin A1c was 7.5. Patient reports that she does not have any issues with her blood pressure and states that she believes the pain she had yesterday was contributing to her blood pressure being high. Patient reports that she does not believe she got  blood pressure medications this morning. Patient states that she will be going home on discharge. Patient states that she has a lot of help at home.       Medications:  Reviewed    Infusion Medications    sodium chloride 125 mL/hr at 11/07/21 1214    sodium chloride      dextrose       Scheduled Medications    rivaroxaban  10 mg Oral Daily    ceFAZolin  2,000 mg IntraVENous Q8H    diphenhydrAMINE  25 mg IntraVENous Once    sodium chloride flush  5-40 mL IntraVENous 2 times per day    insulin lispro  0-6 Units SubCUTAneous TID WC    insulin lispro  0-3 Units SubCUTAneous Nightly    [Held by provider] hydrALAZINE  25 mg Oral 3 times per day     PRN Meds: sodium chloride flush, sodium chloride, morphine **OR** [DISCONTINUED] morphine, ondansetron **OR** ondansetron, oxyCODONE-acetaminophen, oxyCODONE-acetaminophen, glucose, dextrose, glucagon (rDNA), dextrose, HYDROmorphone, cyclobenzaprine      Intake/Output Summary (Last 24 hours) at 11/7/2021 Ondina filed at 11/7/2021 2352  Gross per 24 hour   Intake 2627.13 ml   Output 2000 ml   Net 627.13 ml       Diet:  ADULT DIET; Regular; 4 carb choices (60 gm/meal)    Exam:  BP (!) 106/58   Pulse 100   Temp 98.2 °F (36.8 °C) (Oral)   Resp 16   Ht 5' 3\" (1.6 m)   Wt 237 lb (107.5 kg)   LMP 11/17/2020 (Approximate) Comment: tubal ligation/ablation  SpO2 94%   BMI 41.98 kg/m²   Physical Exam  Constitutional:       Appearance: Normal appearance. HENT:      Head: Normocephalic and atraumatic. Right Ear: External ear normal.      Left Ear: External ear normal.      Nose: Nose normal.      Mouth/Throat:      Pharynx: Oropharynx is clear. Eyes:      Conjunctiva/sclera: Conjunctivae normal.   Cardiovascular:      Rate and Rhythm: Normal rate and regular rhythm. Pulses: Normal pulses. Heart sounds: Normal heart sounds. No murmur heard. No friction rub. No gallop. Pulmonary:      Effort: Pulmonary effort is normal. No respiratory distress. Breath sounds: Normal breath sounds. No wheezing, rhonchi or rales. Abdominal:      General: Bowel sounds are normal. There is no distension. Tenderness: There is no abdominal tenderness. Musculoskeletal:         General: No swelling. Normal range of motion. Cervical back: Normal range of motion and neck supple. Skin:     General: Skin is warm. Neurological:      General: No focal deficit present. Mental Status: She is alert. Labs:   Recent Labs     11/06/21  1012 11/07/21  0534   WBC 11.7*  --    HGB 16.2* 15.0   HCT 48.1* 45.6   *  --      Recent Labs     11/06/21  1012 11/07/21  0534    139   K 4.0 4.6    103   CO2 22* 24   BUN 13 8   CREATININE 0.5 0.5   CALCIUM 9.0 9.0     Recent Labs     11/06/21  1012   AST 51*   ALT 82*   BILIDIR <0.2   BILITOT 0.4   ALKPHOS 80     No results for input(s): INR in the last 72 hours.   No results for input(s): Lily Soto in the last 72 hours.    Urinalysis:      Lab Results   Component Value Date    NITRU NEGATIVE 09/17/2020    WBCUA 2-4 09/17/2020    BACTERIA NONE SEEN 09/17/2020    RBCUA 10-15 09/17/2020    BLOODU NEGATIVE 09/17/2020    SPECGRAV 1.015 08/14/2012    GLUCOSEU NEGATIVE 09/17/2020       Radiology:  [facility] FOR SURGICAL PROCEDURES   Final Result      XR FEMUR RIGHT (MIN 2 VIEWS)   Final Result   Status post open reduction and internal fixation of the distal right femur fracture. **This report has been created using voice recognition software. It may contain minor errors which are inherent in voice recognition technology. **      Final report electronically signed by Dr. Makenzie Garcia on 11/7/2021 9:01 AM      CT FEMUR RIGHT WO CONTRAST   Final Result      1. Acute comminuted fractures of the distal right femur at the level the metaphysis. This is just above the knee prosthesis. There is significant posterior displacement of the distal fragment. The distal femur diaphysis/metaphysis appears to extend    through the quadriceps muscle. 2. Acute obliquely oriented fracture of the distal fibula. This is nondisplaced. 3. Acute fractures of the medial malleolus and posterior tibia. These are nondisplaced. **This report has been created using voice recognition software. It may contain minor errors which are inherent in voice recognition technology. **      Final report electronically signed by Dr. Makenzie Garcia on 11/6/2021 1:49 PM      CT TIBIA FIBULA RIGHT WO CONTRAST   Final Result      1. Acute comminuted fractures of the distal right femur at the level the metaphysis. This is just above the knee prosthesis. There is significant posterior displacement of the distal fragment. The distal femur diaphysis/metaphysis appears to extend    through the quadriceps muscle. 2. Acute obliquely oriented fracture of the distal fibula. This is nondisplaced.       3. Acute fractures of the medial malleolus and posterior tibia. These are nondisplaced. **This report has been created using voice recognition software. It may contain minor errors which are inherent in voice recognition technology. **      Final report electronically signed by Dr. Kat Barrera on 11/6/2021 1:49 PM      CT RECONSTRUCTION WO POST PROCESS   Final Result      1. Acute comminuted fractures of the distal right femur at the level the metaphysis. This is just above the knee prosthesis. There is significant posterior displacement of the distal fragment. The distal femur diaphysis/metaphysis appears to extend    through the quadriceps muscle. 2. Acute obliquely oriented fracture of the distal fibula. This is nondisplaced. 3. Acute fractures of the medial malleolus and posterior tibia. These are nondisplaced. **This report has been created using voice recognition software. It may contain minor errors which are inherent in voice recognition technology. **      Final report electronically signed by Dr. Kat Barrera on 11/6/2021 1:49 PM      XR KNEE RIGHT (1-2 VIEWS)   Final Result   Markedly displaced distal femoral fracture, improved from earlier study. **This report has been created using voice recognition software. It may contain minor errors which are inherent in voice recognition technology. **      Final report electronically signed by Dr. Jessica Doyle on 11/6/2021 1:18 PM      XR ANKLE RIGHT (2 VIEWS)   Final Result   Nondisplaced oblique fracture distal fibular shaft. **This report has been created using voice recognition software. It may contain minor errors which are inherent in voice recognition technology. **      Final report electronically signed by Dr. Jessica Doyle on 11/6/2021 1:20 PM      XR KNEE RIGHT (1-2 VIEWS)   Final Result    Acute fracture of the distal right femur just above the right knee prosthesis. There is significant angulation and displacement.                **This report has been created using voice recognition software. It may contain minor errors which are inherent in voice recognition technology. **      Final report electronically signed by Dr. Kat Barrera on 11/6/2021 10:14 AM          Diet: ADULT DIET; Regular; 4 carb choices (60 gm/meal)    DVT prophylaxis: [] Lovenox                                 [] SCDs                                 [] SQ Heparin                                 [] Encourage ambulation           [x] Already on Anticoagulation     Disposition:    [x] Home       [] TCU       [] Rehab       [] Psych       [] SNF       [] [place]       [] Other-    Code Status: Full Code    PT/OT Eval Status: On board    Assessment/Plan:    Anticipated Discharge in : To be determined by primary team    Active Hospital Problems    Diagnosis Date Noted    Magda-prosthetic supracondylar fracture of femur [R14. Hubert Gee De [address] 656 11/06/2021     Priority: High    Elevated blood pressure reading [R03.0] 11/06/2021     Priority: Medium    Malignant neoplasm of overlapping sites of left breast in female, estrogen receptor positive ([facility] [address] [F86.073, Z17.0] 06/15/2020     Priority: Low    Type 2 diabetes mellitus without complication, without long-term current use of insulin ([facility] [address] [E11.9]      Priority: Low       1. Distal femur fracture status post open treatment right supracondylar femur fracture without ensure condylar extension POD#0-patient status post fall resulting in right distal femur fracture. Patient is to be nonweightbearing on her leg. Patient states that pain is much better controlled after surgery. PT/OT on board. Patient will need adequate pain control. Patient states that she will be going home on discharge. Will defer to primary team for timing of discharge. 2. Elevated blood pressure reading-patient noted to have elevated blood pressure on admission. Patient states that she was in significant amount of pain.   Patient's elevated blood pressure is most likely secondary to uncontrolled pain. Patient did receive 1 dose of hydralazine 25 mg p.o. on 11/6/2021. Patient's hydralazine was not given on 11/7/2021. Hydralazine has been held, as patient does not appear to be hypertensive. We will continue to monitor blood pressure. Blood pressure control should be adequate with pain control. 3. Type 2 diabetes-patient with history of type 2 diabetes. Patient uses Metformin which is currently on hold. Patient's hemoglobin A1c is 7.5. Patient states that this is a high as she is ever had before. On review of medical records, patient's hemoglobin A1c is typically under 7. Patient has been on several medications for her breast cancer which may have caused some elevation. Patient notes that she has been cutting down significantly on her carbs recently. Patient encouraged to continue with her current diet regimen. Patient will follow up with her primary for any further adjustments to her medications for diabetes. Accu-Cheks as scheduled. 4. History of breast cancer-patient states that she was diagnosed with breast cancer in 2019. Patient has undergone a mastectomy. Patient will follow up with her oncologist as outpatient.         Electronically signed by David Arellano MD on 11/7/2021 at 2:57 PM

## 2021-11-07 NOTE — PROGRESS NOTES
..Pt admitted to  7K24 via cart/stretcher. Complaints: Left hip pain. INT  site free of s/s of infection or infiltration. Vital signs obtained. Assessment and data collection initiated. Two nurse skin assessment performed by [facility] RN and [employer]. Oriented to room. The patient is interested in [facility]. Patricias meds to beds program?:  No    Policies and procedures for 7 explained. All questions answered with no further questions at this time. Fall prevention and safety brochure discussed with patient. Bed alarm on. Call light in reach.

## 2021-11-07 NOTE — PROGRESS NOTES
In OR holding with family. Bilateral nares swabbed with alcohol. Temp 98.2. Wedding band handed off to spouse.

## 2021-11-07 NOTE — ANESTHESIA POSTPROCEDURE EVALUATION
Department of Anesthesiology  Postprocedure Note    Patient: Carlee Washington  MRN: [id number]  YOB: 1970  Date of evaluation: 11/7/2021  Time:  10:19 AM     Procedure Summary     Date: 11/07/21 Room / Location: [facility]    Anesthesia Start: 9341 Anesthesia Stop: 4015    Procedure: FEMUR OPEN REDUCTION INTERNAL FIXATION (Right Leg Upper) Diagnosis: (Fx. RIGHT DISTAL FEMUR)    Surgeons: Eric Walker MD Responsible Provider: Valente Brock DO    Anesthesia Type: general ASA Status: 3          Anesthesia Type: general    Young Phase I: Young Score: 8    Young Phase II:      Last vitals: Reviewed and per EMR flowsheets.        Anesthesia Post Evaluation    Patient location during evaluation: PACU  Patient participation: complete - patient participated  Level of consciousness: awake and alert  Pain score: 3  Airway patency: patent  Nausea & Vomiting: no nausea and no vomiting  Complications: no  Cardiovascular status: hemodynamically stable and blood pressure returned to baseline  Respiratory status: spontaneous ventilation, room air and acceptable  Hydration status: stable

## 2021-11-07 NOTE — PROGRESS NOTES
0906  Pt. Unresponsive on adm. To pacu. oralairway in place. Right leg prineo dressing intact and dry. Ice applied. 0912  chemstick 154.  0929  Pt. Continues to rest soundly with oralairway in place. 0932  Pt. Reacting. oralairway removed. o2 per nasal cannula at 2 liters. 0934  Pt. Awake and oriented. Pt. Denies pain. Pt. Returns to sleep easily. 2004  Report called to [facility]  pacu criteria met. Transfer to [facility] [phone].

## 2021-11-07 NOTE — OP NOTE
Operative Note      Patient: Gloria Coronel  YOB: 1970  MRN: [id number]    Date of Procedure: 11/7/2021    Pre-Op Diagnosis: Fx. right supracondylar periprosthetic femur fracture without intercondylar extension    Post-Op Diagnosis: Same       Procedure:  Open treatment right supracondylar femur fracture without intercondylar extension 14578    Surgeon(s):  Cathy Wilkerson MD    Assistant:   Physician Assistant: Sabrina Graff PA-C    Anesthesia: General    Estimated Blood Loss (mL): 441     Complications: None    Specimens:   * No specimens in log *    Implants:  Implant Name Type Inv. Item Serial No.  Lot No. LRB No. Used Action   PLATE BNE [id number] 13 H R DST LAT FEM S STL WAQAS FOR 4.5MM SCR  PLATE BNE [id number] 13 H R DST LAT FEM S STL WAQAS FOR 4.5MM SCR  [employer] [facility]  Right 1 Implanted   SCREW BNE L74MM DIA4.5MM CAROLEE PROX FEM ANK S STL ST FULL  SCREW BNE L74MM DIA4.5MM CAROLEE PROX FEM ANK S STL ST FULL  [employer] [facility]  Right 1 Explanted   SCREW BNE L40MM DIA4.5MM CAROLEE S STL ST FULL THRD T25  SCREW BNE L40MM DIA4.5MM CAROLEE S STL ST FULL THRD T25  [employer] [facility]  Right 1 Implanted         Drains: * No LDAs found *    Findings: Confirmed    Detailed Description of Procedure: Indications  This is a 78-year-old female history of a fall. Pain in the right leg. She has previous history of a total knee replacement on that side. She sustained a supracondylar femur fracture above a total knee replacement. Significantly displaced. Felt she would benefit from op intervention the early mobilization as well as fracture reduction maintenance. Patient and family agreed. Narrative  Patient taken operating room underwent a general anesthetic. He received 2 g of Ancef. The right lower extremity was prepped draped normal sterile fashion. Timeout was taken consent was confirmed. Start the lateral posterior distal femur.  Skin knife electrocautery down the iliotibial band. Iliotibial band was then split. The vastus lateralis was elevated. Fracture was then disimpacted from the vastus medialis. With a series of bone hooks and ball spike pusher's the fracture was then placed in near anatomic alignment. A 13 hole distal femoral locking plate was slid into position. 1 nonlocking screw proximally and distally to compress plate to bone. A series of locking screws were then placed distally and nonlocking in the shaft. Nonlocking screw in the distal femur was then replaced with a locking screw. X-rays demonstrate fracture be near-anatomic alignment hardware in satisfactory position. Wounds were irrigated. Injected local anesthetic. The vastus was allowed to fall back into position. The iliotibial band was repaired with #2 Quill suture. The skin was repaired with 0 Quill suture. The skin was sealed with Prineo. Dry dressings were applied. Patient was then awakened turned to cover room good condition. Postoperative plan  Nonweightbearing on that side. Dry dressings as necessary. See her in the office in 2 months x-rays 2 views of the right femur at that time. She will also need x-rays 2 views of the right ankle at that time. For her bimalleolar ankle fracture in the form of the lateral malleolus and posterior malleolus. The pernio can be removed in 2 to 3 weeks.     Electronically signed by Manny Nyhan, MD on 11/7/2021 at 8:39 AM

## 2021-11-08 LAB
GLUCOSE BLD-MCNC: 140 MG/DL (ref 70–108)
GLUCOSE BLD-MCNC: 150 MG/DL (ref 70–108)
GLUCOSE BLD-MCNC: 153 MG/DL (ref 70–108)
GLUCOSE BLD-MCNC: 154 MG/DL (ref 70–108)

## 2021-11-08 PROCEDURE — 1200000000 HC SEMI PRIVATE

## 2021-11-08 PROCEDURE — 97166 OT EVAL MOD COMPLEX 45 MIN: CPT

## 2021-11-08 PROCEDURE — 97535 SELF CARE MNGMENT TRAINING: CPT

## 2021-11-08 PROCEDURE — 6370000000 HC RX 637 (ALT 250 FOR IP): Performed by: ORTHOPAEDIC SURGERY

## 2021-11-08 PROCEDURE — 97530 THERAPEUTIC ACTIVITIES: CPT

## 2021-11-08 PROCEDURE — 82948 REAGENT STRIP/BLOOD GLUCOSE: CPT

## 2021-11-08 PROCEDURE — 6370000000 HC RX 637 (ALT 250 FOR IP): Performed by: PEDIATRICS

## 2021-11-08 PROCEDURE — 2580000003 HC RX 258: Performed by: ORTHOPAEDIC SURGERY

## 2021-11-08 PROCEDURE — 99232 SBSQ HOSP IP/OBS MODERATE 35: CPT | Performed by: HOSPITALIST

## 2021-11-08 PROCEDURE — 97542 WHEELCHAIR MNGMENT TRAINING: CPT

## 2021-11-08 PROCEDURE — 6370000000 HC RX 637 (ALT 250 FOR IP): Performed by: HOSPITALIST

## 2021-11-08 PROCEDURE — 6360000002 HC RX W HCPCS: Performed by: ORTHOPAEDIC SURGERY

## 2021-11-08 PROCEDURE — 97116 GAIT TRAINING THERAPY: CPT

## 2021-11-08 PROCEDURE — 97162 PT EVAL MOD COMPLEX 30 MIN: CPT

## 2021-11-08 RX ORDER — CYCLOBENZAPRINE HCL 10 MG
10 TABLET ORAL 3 TIMES DAILY PRN
Qty: 40 TABLET | Refills: 0 | Status: SHIPPED | OUTPATIENT
Start: 2021-11-08 | End: 2022-01-24 | Stop reason: SDUPTHER

## 2021-11-08 RX ORDER — ACETAMINOPHEN 325 MG/1
650 TABLET ORAL EVERY 4 HOURS PRN
Status: DISCONTINUED | OUTPATIENT
Start: 2021-11-08 | End: 2021-11-09 | Stop reason: HOSPADM

## 2021-11-08 RX ORDER — ALPRAZOLAM 0.5 MG/1
0.5 TABLET ORAL ONCE
Status: COMPLETED | OUTPATIENT
Start: 2021-11-08 | End: 2021-11-08

## 2021-11-08 RX ADMIN — CYCLOBENZAPRINE 10 MG: 10 TABLET, FILM COATED ORAL at 01:31

## 2021-11-08 RX ADMIN — INSULIN LISPRO 1 UNITS: 100 INJECTION, SOLUTION INTRAVENOUS; SUBCUTANEOUS at 07:56

## 2021-11-08 RX ADMIN — HYDROMORPHONE HYDROCHLORIDE 0.5 MG: 1 INJECTION, SOLUTION INTRAMUSCULAR; INTRAVENOUS; SUBCUTANEOUS at 01:31

## 2021-11-08 RX ADMIN — OXYCODONE AND ACETAMINOPHEN 2 TABLET: 5; 325 TABLET ORAL at 04:49

## 2021-11-08 RX ADMIN — ROPINIROLE HYDROCHLORIDE 1 MG: 1 TABLET, FILM COATED ORAL at 20:40

## 2021-11-08 RX ADMIN — OXYCODONE AND ACETAMINOPHEN 2 TABLET: 5; 325 TABLET ORAL at 08:37

## 2021-11-08 RX ADMIN — PAROXETINE HYDROCHLORIDE 40 MG: 20 TABLET, FILM COATED ORAL at 20:39

## 2021-11-08 RX ADMIN — CYCLOBENZAPRINE 10 MG: 10 TABLET, FILM COATED ORAL at 12:20

## 2021-11-08 RX ADMIN — HYDROMORPHONE HYDROCHLORIDE 0.5 MG: 1 INJECTION, SOLUTION INTRAMUSCULAR; INTRAVENOUS; SUBCUTANEOUS at 16:29

## 2021-11-08 RX ADMIN — RIVAROXABAN 10 MG: 10 TABLET, FILM COATED ORAL at 16:29

## 2021-11-08 RX ADMIN — ALPRAZOLAM 0.5 MG: 0.5 TABLET ORAL at 20:39

## 2021-11-08 RX ADMIN — SODIUM CHLORIDE, PRESERVATIVE FREE 10 ML: 5 INJECTION INTRAVENOUS at 20:41

## 2021-11-08 RX ADMIN — CYCLOBENZAPRINE 10 MG: 10 TABLET, FILM COATED ORAL at 20:39

## 2021-11-08 RX ADMIN — CEFAZOLIN 2000 MG: 10 INJECTION, POWDER, FOR SOLUTION INTRAVENOUS at 01:33

## 2021-11-08 RX ADMIN — OXYCODONE AND ACETAMINOPHEN 2 TABLET: 5; 325 TABLET ORAL at 13:37

## 2021-11-08 RX ADMIN — OXYCODONE AND ACETAMINOPHEN 2 TABLET: 5; 325 TABLET ORAL at 18:34

## 2021-11-08 ASSESSMENT — PAIN DESCRIPTION - PROGRESSION
CLINICAL_PROGRESSION: RAPIDLY WORSENING
CLINICAL_PROGRESSION: RAPIDLY WORSENING
CLINICAL_PROGRESSION: GRADUALLY WORSENING
CLINICAL_PROGRESSION: RAPIDLY WORSENING
CLINICAL_PROGRESSION: RAPIDLY WORSENING
CLINICAL_PROGRESSION: GRADUALLY WORSENING
CLINICAL_PROGRESSION: RAPIDLY WORSENING
CLINICAL_PROGRESSION: RAPIDLY WORSENING
CLINICAL_PROGRESSION: GRADUALLY WORSENING
CLINICAL_PROGRESSION: RAPIDLY WORSENING
CLINICAL_PROGRESSION: RAPIDLY WORSENING
CLINICAL_PROGRESSION: GRADUALLY WORSENING

## 2021-11-08 ASSESSMENT — PAIN DESCRIPTION - LOCATION
LOCATION: LEG
LOCATION: LEG

## 2021-11-08 ASSESSMENT — PAIN SCALES - GENERAL
PAINLEVEL_OUTOF10: 10
PAINLEVEL_OUTOF10: 7
PAINLEVEL_OUTOF10: 10
PAINLEVEL_OUTOF10: 9
PAINLEVEL_OUTOF10: 7
PAINLEVEL_OUTOF10: 9
PAINLEVEL_OUTOF10: 9

## 2021-11-08 ASSESSMENT — PAIN DESCRIPTION - ORIENTATION
ORIENTATION: RIGHT
ORIENTATION: RIGHT

## 2021-11-08 ASSESSMENT — PAIN DESCRIPTION - FREQUENCY: FREQUENCY: CONTINUOUS

## 2021-11-08 ASSESSMENT — PAIN DESCRIPTION - PAIN TYPE
TYPE: SURGICAL PAIN;ACUTE PAIN
TYPE: SURGICAL PAIN

## 2021-11-08 ASSESSMENT — PAIN DESCRIPTION - ONSET: ONSET: GRADUAL

## 2021-11-08 ASSESSMENT — PAIN DESCRIPTION - DESCRIPTORS: DESCRIPTORS: BURNING;ACHING

## 2021-11-08 NOTE — PROGRESS NOTES
Physical Therapy  Date: 2021  Patient Name: Paul Falcon        MRN: [id number]    Account Number: [de-identified]  : 1970  (44 y.o.)  Gender: female           Paul Falcon requires a standard walker (without wheels) to complete bathing, toileting, dressing and grooming tasks. A standard walker is also needed for use during transfer to commode, wheelchair, or bed. These tasks cannot be completed by utilizing a cane secondary to upper body weakness, unsteady gait and/or the non-weightbearing status of the right lower extremity.      Surekha Morris, PT, DPT

## 2021-11-08 NOTE — PROGRESS NOTES
Hospitalist Progress Note    Patient:  Luis Antonio Ledbetter      Unit/Bed:7K-24/024-A    YOB: 1970    MRN: [id number]       Acct: [de-identified]     PCP: JERI Severino CNP    Date of Admission: 11/6/2021      Subjective: Patient reports that pain is much better controlled. Patient states that she will be going home on discharge. Patient is planning on going home tomorrow. Medications:  Reviewed    Infusion Medications    sodium chloride 125 mL/hr at 11/07/21 1214    sodium chloride      dextrose       Scheduled Medications    rivaroxaban  10 mg Oral Daily    PARoxetine  40 mg Oral Daily    rOPINIRole  1 mg Oral Nightly    diphenhydrAMINE  25 mg IntraVENous Once    sodium chloride flush  5-40 mL IntraVENous 2 times per day    insulin lispro  0-6 Units SubCUTAneous TID WC    insulin lispro  0-3 Units SubCUTAneous Nightly    [Held by provider] hydrALAZINE  25 mg Oral 3 times per day     PRN Meds: sodium chloride flush, sodium chloride, ondansetron **OR** ondansetron, oxyCODONE-acetaminophen, oxyCODONE-acetaminophen, glucose, dextrose, glucagon (rDNA), dextrose, HYDROmorphone, cyclobenzaprine      Intake/Output Summary (Last 24 hours) at 11/8/2021 1458  Last data filed at 11/8/2021 0422  Gross per 24 hour   Intake 2429.59 ml   Output 1850 ml   Net 579.59 ml       Diet:  ADULT DIET; Regular; 4 carb choices (60 gm/meal)    Exam:  /72   Pulse 92   Temp 98.2 °F (36.8 °C) (Oral)   Resp 16   Ht 5' 3\" (1.6 m)   Wt 237 lb (107.5 kg)   LMP 11/17/2020 (Approximate) Comment: tubal ligation/ablation  SpO2 93%   BMI 41.98 kg/m²   Physical Exam  Constitutional:       Appearance: Normal appearance. HENT:      Head: Normocephalic and atraumatic. Right Ear: External ear normal.      Left Ear: External ear normal.      Nose: Nose normal.      Mouth/Throat:      Pharynx: Oropharynx is clear.    Eyes:      Conjunctiva/sclera: Conjunctivae normal.   Cardiovascular:      Rate and Rhythm: Normal rate and regular rhythm. Pulses: Normal pulses. Heart sounds: Normal heart sounds. No murmur heard. No friction rub. No gallop. Pulmonary:      Effort: Pulmonary effort is normal. No respiratory distress. Breath sounds: Normal breath sounds. No wheezing, rhonchi or rales. Abdominal:      General: Bowel sounds are normal. There is no distension. Tenderness: There is no abdominal tenderness. Musculoskeletal:         General: No swelling. Normal range of motion. Cervical back: Normal range of motion and neck supple. Skin:     General: Skin is warm. Neurological:      General: No focal deficit present. Mental Status: She is alert. Labs:   Recent Labs     11/06/21  1012 11/07/21  0534   WBC 11.7*  --    HGB 16.2* 15.0   HCT 48.1* 45.6   *  --      Recent Labs     11/06/21  1012 11/07/21  0534    139   K 4.0 4.6    103   CO2 22* 24   BUN 13 8   CREATININE 0.5 0.5   CALCIUM 9.0 9.0     Recent Labs     11/06/21  1012   AST 51*   ALT 82*   BILIDIR <0.2   BILITOT 0.4   ALKPHOS 80     No results for input(s): INR in the last 72 hours. No results for input(s): Emilia Risk in the last 72 hours. Urinalysis:      Lab Results   Component Value Date    NITRU NEGATIVE 09/17/2020    WBCUA 2-4 09/17/2020    BACTERIA NONE SEEN 09/17/2020    RBCUA 10-15 09/17/2020    BLOODU NEGATIVE 09/17/2020    SPECGRAV 1.015 08/14/2012    GLUCOSEU NEGATIVE 09/17/2020       Radiology:  [facility] PROCEDURES   Final Result      XR FEMUR RIGHT (MIN 2 VIEWS)   Final Result   Status post open reduction and internal fixation of the distal right femur fracture. **This report has been created using voice recognition software. It may contain minor errors which are inherent in voice recognition technology. **      Final report electronically signed by Dr. Doreen Lawson on 11/7/2021 9:01 AM      CT FEMUR RIGHT WO CONTRAST   Final Result      1.  Acute comminuted fractures of the distal right femur at the level the metaphysis. This is just above the knee prosthesis. There is significant posterior displacement of the distal fragment. The distal femur diaphysis/metaphysis appears to extend    through the quadriceps muscle. 2. Acute obliquely oriented fracture of the distal fibula. This is nondisplaced. 3. Acute fractures of the medial malleolus and posterior tibia. These are nondisplaced. **This report has been created using voice recognition software. It may contain minor errors which are inherent in voice recognition technology. **      Final report electronically signed by Dr. Fer Conley on 11/6/2021 1:49 PM      CT TIBIA FIBULA RIGHT WO CONTRAST   Final Result      1. Acute comminuted fractures of the distal right femur at the level the metaphysis. This is just above the knee prosthesis. There is significant posterior displacement of the distal fragment. The distal femur diaphysis/metaphysis appears to extend    through the quadriceps muscle. 2. Acute obliquely oriented fracture of the distal fibula. This is nondisplaced. 3. Acute fractures of the medial malleolus and posterior tibia. These are nondisplaced. **This report has been created using voice recognition software. It may contain minor errors which are inherent in voice recognition technology. **      Final report electronically signed by Dr. Fer Conley on 11/6/2021 1:49 PM      CT RECONSTRUCTION WO POST PROCESS   Final Result      1. Acute comminuted fractures of the distal right femur at the level the metaphysis. This is just above the knee prosthesis. There is significant posterior displacement of the distal fragment. The distal femur diaphysis/metaphysis appears to extend    through the quadriceps muscle. 2. Acute obliquely oriented fracture of the distal fibula. This is nondisplaced.       3. Acute fractures of the medial malleolus and posterior tibia. These are nondisplaced. **This report has been created using voice recognition software. It may contain minor errors which are inherent in voice recognition technology. **      Final report electronically signed by Dr. Adan Daily on 11/6/2021 1:49 PM      XR KNEE RIGHT (1-2 VIEWS)   Final Result   Markedly displaced distal femoral fracture, improved from earlier study. **This report has been created using voice recognition software. It may contain minor errors which are inherent in voice recognition technology. **      Final report electronically signed by Dr. Chris Cruz on 11/6/2021 1:18 PM      XR ANKLE RIGHT (2 VIEWS)   Final Result   Nondisplaced oblique fracture distal fibular shaft. **This report has been created using voice recognition software. It may contain minor errors which are inherent in voice recognition technology. **      Final report electronically signed by Dr. Chris Cruz on 11/6/2021 1:20 PM      XR KNEE RIGHT (1-2 VIEWS)   Final Result    Acute fracture of the distal right femur just above the right knee prosthesis. There is significant angulation and displacement. **This report has been created using voice recognition software. It may contain minor errors which are inherent in voice recognition technology. **      Final report electronically signed by Dr. Adan Daily on 11/6/2021 10:14 AM          Diet: ADULT DIET; Regular; 4 carb choices (60 gm/meal)    DVT prophylaxis: [] Lovenox                                 [] SCDs                                 [] SQ Heparin                                 [] Encourage ambulation           [x] Already on Anticoagulation     Disposition:    [x] Home       [] TCU       [] Rehab       [] Psych       [] SNF       [] [place]       [] Other-    Code Status: Full Code    PT/OT Eval Status:  On board    Assessment/Plan:    Anticipated Discharge in : [facility] Problems    Diagnosis Date Noted    Magda-prosthetic supracondylar fracture of femur [id number]. [id number], Hubert [address] 11/06/2021     Priority: High    Elevated blood pressure reading [R03.0] 11/06/2021     Priority: Medium    Malignant neoplasm of overlapping sites of left breast in female, estrogen receptor positive ([facility] [P62.029, Z17.0] 06/15/2020     Priority: Low    Type 2 diabetes mellitus without complication, without long-term current use of insulin ([facility] [E11.9]      Priority: Low       1. Distal femur fracture status post open treatment right supracondylar femur fracture without ensure condylar extension POD#1-patient status post fall resulting in right distal femur fracture. Patient is to be nonweightbearing on her leg. Patient states that pain is much better controlled after surgery. PT/OT on board. Patient will need adequate pain control. Patient states that she will be going home on discharge. Will defer to primary team for timing of discharge. 2. Elevated blood pressure reading-patient noted to have elevated blood pressure on admission. Patient states that she was in significant amount of pain. Patient's elevated blood pressure is most likely secondary to uncontrolled pain. Patient did receive 1 dose of hydralazine 25 mg p.o. on 11/6/2021. Patient's hydralazine was not given on 11/7/2021. Hydralazine has been held, as patient does not appear to be hypertensive. We will continue to monitor blood pressure. Blood pressure control should be adequate with pain control. Hydralazine p.o. discontinued on 11/8/2021. Patient's blood pressure within normal limits. 3. Type 2 diabetes-patient with history of type 2 diabetes. Patient uses Metformin which is currently on hold. Patient's hemoglobin A1c is 7.5. Patient states that this is a high as she is ever had before. On review of medical records, patient's hemoglobin A1c is typically under 7.   Patient has been on several medications for her breast cancer which may have caused some elevation. Patient notes that she has been cutting down significantly on her carbs recently. Patient encouraged to continue with her current diet regimen. Patient will follow up with her primary for any further adjustments to her medications for diabetes. Accu-Cheks as scheduled. Blood sugars mostly controlled. 4. History of breast cancer-patient states that she was diagnosed with breast cancer in 2019. Patient has undergone a mastectomy. Patient will follow up with her oncologist as outpatient. 5. Disposition-patient is able to be discharged from hospitalist standpoint.         Electronically signed by Anne Marie Herzog MD on 11/8/2021 at 2:58 PM

## 2021-11-08 NOTE — CARE COORDINATION
Mack Meek requires a standard wheelchair with elevating leg rest to prevent swelling to complete bathing, toileting, dressing and grooming tasks. These tasks cannot be completed by utilizing a cane or a walker secondary to impaired ambulation and mobility. Mack Meek is able to safely maneuver a standard wheelchair in the home. Mack Meek requires a standard walker (without wheels) to complete bathing, toileting, dressing and grooming tasks. A standard walker is also needed for use during transfer to commode, wheelchair, or bed.   These tasks cannot be completed by utilizing a cane secondary to upper body weakness, unsteady gait and/or the non-weightbearing status of the right lower extremity.

## 2021-11-08 NOTE — PROGRESS NOTES
[address] [facility] - 8H-74/981-F    Time In: 1594  Time Out: 0920  Timed Code Treatment Minutes: 45 Minutes  Minutes: 51          Date: 2021  Patient Name: Yelena Galindo,  Gender:  female        MRN: [id number]  : 1970  (44 y.o.)      Referring Practitioner: Caio Nielsen MD  Diagnosis: closed displaced fracture of distal epiphysis of right femur  Additional Pertinent Hx: Per HPI: \"The patient is a 44 y.o. female  who presents with with pain to the right lower extremity. Had a history of a fall. Pain is localized around the ankle and knee. She was evaluated emergency department diagnosed with right supracondylar femur fracture as well as a right bimalleolar ankle fracture in the form of a lateral malleolus and posterior malleolus. \" Pt is now s/p Open treatment right supracondylar femur fracture without intercondylar extension  (DOS: 21). Restrictions/Precautions:  Restrictions/Precautions: Weight Bearing, General Precautions, Fall Risk  Right Lower Extremity Weight Bearing: Non Weight Bearing  Position Activity Restriction  Other position/activity restrictions: s/p R femur ORIF, R bimalleolar ankle fracture (non-op)    Subjective:  Chart Reviewed: Yes  Patient assessed for rehabilitation services?: Yes  Family / Caregiver Present: No  Subjective: RN approved PT evaulation, stating pt has several questions about mobility. Pt agreeable to therapy and eager to participate. She states after dealing with breast cancer and a previous TKA, she is highly motivated and has a positive attitude. She is hopeful to return home soon, but wants to make sure she is safe with mobility prior to discharge. At end of session, pt states she feels much more confident with ability to mobilize with NWB status, however, would like to trial steps one more time tomorrow before returning home.      General:  Follows Commands: Within Functional Limits    Vision: Impaired  Vision Exceptions: Wears glasses at all times    Hearing: Within functional limits    Pain: R hip, R ankle--not quantified     Vitals: Vitals not assessed per clinical judgement, see nursing flowsheet    Social/Functional History:    Lives With: Spouse, Daughter (STEFAN)  Type of Home: House  Home Layout: Two level, Able to Live on Main level with bedroom/bathroom  Home Access: Stairs to enter without rails  Entrance Stairs - Number of Steps: 1 + 1  Home Equipment:  (no DME PTA)      Ambulation Assistance: Independent  Transfer Assistance: Independent    Active : Yes  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  Additional Comments: pt will have a family member stay with her as she needs; she states she is planning to purchase an upper body ergometer so she can perform aerobic exercise and strengthen her UEs    OBJECTIVE:  Range of Motion:  Right Lower Extremity: Impaired - hip, knee, and ankle (not assessed) all limited due to pain and injuries   Left Lower Extremity: [facility]    Strength:  Right Lower Extremity: Impaired - functional limitations present with transfers--requires assistance to manuever OOB, difficulty holding up LE off floor for NWB, but able to do so; also limited by NWB   Left Lower Extremity: [facility]    Balance:  Static Sitting Balance:  Supervision  Dynamic Sitting Balance: Supervision  Static Standing Balance: Stand By Assistance  Dynamic Standing Balance: Contact Guard Assistance, Minimal Assistance    Bed Mobility:  Supine to Sit: Minimal Assistance, X 1, with head of bed raised, with verbal cues , with increased time for completion, assist to bring R LE OOB  Scooting: Stand By Assistance, with increased time for completion, good use of UEs to help in scooting forward and backwards while seated    Transfers:  Sit to Stand: Stand By Assistance  Stand to Sit:Stand By Assistance   *pt impulsively stood up from EOB without AD, while PT was preparing walker--cautioned pt to maintain NWB--pt did maintain NWB successfully and then sat back down---pt states she is eager to get OOB  *cues for hand placement when performing STS up to walker and from w/c   *multiple STS throughout session from EOB and w/c     Ambulation:  Contact Guard Assistance, with cues for safety, with verbal cues , with increased time for completion  Distance: ~3' x 4 trials   Surface: Level Tile  Device:Standard Walker  Gait Deviations:  Slow Zoila, Decreased Step Length on Left, Decreased Gait Speed and Swing-to gait pattern  *cues for appropriate walker proximity     Stairs:  Ascent/descent of 6\" platform step with standard walker x 2 trials with Min A progressing to CGA utilizing retro approach on ascent, forward descent  *demonstration provided prior to pt attempting, emphasis on ensuring balance before transferring walker up/down step  *pt required cues for sequencing and foot/walker placement especially on first attempt, and demonstrated good carry-over on second attempt. Pt did bump toes of R foot on step on first descent--pt educated in best way to maintain NWB with step and had good follow through on second trial   *slightly unsteady, though did not lose balance--education on having assistance at home when navigating steps--pt states her  will be able to help. Educated in appropriate guarding technique and use of gait belt--pt's  not present for hands-on education     WheelChair Navigation:  Propulsion of manual w/c 100' with CGA, including 180 degree and 90 degree turns. Pt did bump into trash cart in hallway--cues then provided for how to make slight adjustments for navigating narrow spaces and pt demonstrated good carry over   *education on use of brakes  *education on how to perform turns     Functional Outcome Measures: Completed  AM-PAC Inpatient Mobility Raw Score : 18  AM-PAC Inpatient T-Scale Score : 43.63    ASSESSMENT:  Activity Tolerance:  Patient tolerance of  treatment: good. Treatment Initiated: Treatment and education initiated within context of evaluation. Evaluation time included review of current medical information, gathering information related to past medical, social and functional history, completion of standardized testing, formal and informal observation of tasks, assessment of data and development of plan of care and goals. Treatment time included skilled education and facilitation of tasks to increase safety and independence with functional mobility for improved independence and quality of life. Assessment: Body structures, Functions, Activity limitations: Decreased functional mobility , Increased pain, Decreased balance, Decreased posture, Decreased strength, Decreased endurance  Assessment: Pt is below PLOF by way of transfers and ambulation s/p fall down bleachers leading to ORIF of R femur and R bimallelor fracture. Pt is primarily limited by increased pain and NWB status. She also has decreased functional strength and endurance. Pt will benefit from continued physical therapy to return to PLOF.   Prognosis: Good    REQUIRES PT FOLLOW UP: Yes    Discharge Recommendations:  Discharge Recommendations: Home with assist PRN, Patient would benefit from continued therapy after discharge    Patient Education:  PT Education: Goals, General Safety, Gait Training, PT Role, Functional Mobility Training, Adaptive Device Training, Transfer Training, Weight-bearing Education, Precautions, Injury Prevention, Plan of Care    Equipment Recommendations:  Equipment Needed: Yes  Mobility Devices: Crow Spore, Wheelchair  Walker: Standard  Wheelchair: Standard    Plan:  Times per week: 6 x O  Times per day: Daily  Specific instructions for Next Treatment: stairs  Current Treatment Recommendations: Stair training, Transfer Training, Gait Training, Functional Mobility Training, Wheelchair Mobility Training, Balance Training, Patient/Caregiver Education & Training, Endurance Training, Safety Education & Training, Strengthening, Home Exercise Program    Goals:  Patient goals : return home, be able to move safely with NWB  Short term goals  Time Frame for Short term goals: by hospital dishcharge  Short term goal 1: Supine to/from sit with HOB flat with SBA for ease of getting in/OOB at discharge site. Short term goal 2: Sit to/from stand with modified independence for ease of transfers at discharge site. Short term goal 3: Ambulate 21' with standard walker with SBA, maintaining NWB of R LE, for ambulation of short household distances to access various rooms. Short term goal 4: Propel manual w/c 300' with modified independence, including multiple turns and navigating through narrow spaces to navigate discharge environment. Short term goal 5: Ascend/descend 2 platform steps with standard walker with CGA x 1, maintaining NWB R LE, for ease of entering/exiting home. Long term goals  Time Frame for Long term goals : N/A due to short ELOS. Following session, patient left in safe position with all fall risk precautions in place.     Claudean Bene, PT, DPT

## 2021-11-08 NOTE — CARE COORDINATION
11/8/21, 8:47 AM EST  DISCHARGE PLANNING EVALUATION:    Briana See       Admitted: 11/6/2021/ [address] [facility] day: 2   Location: -24/024-A Reason for admit: Closed displaced fracture of distal epiphysis of right femur, initial encounter ([facility] [S72.441A]  Periprosthetic supracondylar fracture of femur, initial encounter [D63. Julia Slipper   PMH:  has a past medical history of Arthritis, Breast cancer ([facility] H/O bladder infections, History of stomach ulcers, Knee cartilage, torn, left, PONV (postoperative nausea and vomiting), and Type II or unspecified type diabetes mellitus without mention of complication, not stated as uncontrolled. Procedure: 11/7  Open treatment right supracondylar femur fracture without intercondylar extension  Barriers to Discharge:  POD #1, pain control, NWB to affected extrem  PCP: JERI Capone CNP  Readmission Risk Score: 13.4 ( )%    Patient Goals/Plan/Treatment Preferences: Met with Marly; she plans home with her  and 6year old. She has PCP, insurance, and requires a W/C with elevating leg rests plus standard walker. Plans home Tuesday after breakfast; DME needs co-signed, declined HH. Updated Wes Marshall from [address] [facility]. Transportation/Food Security/Housekeeping Addressed:  No issues identified. 11/8/21, 4:28 PM EST    Patient goals/plan/ treatment preferences discussed by  and . Patient goals/plan/ treatment preferences reviewed with patient/ family. Patient/ family verbalize understanding of discharge plan and are in agreement with goal/plan/treatment preferences. Understanding was demonstrated using the teach back method. AVS provided by RN at time of discharge, which includes all necessary medical information pertaining to the patients current course of illness, treatment, post-discharge goals of care, and treatment preferences.

## 2021-11-08 NOTE — PROGRESS NOTES
Orthopaedic Progress Note      SUBJECTIVE   Ms. Yaritza Redding is post op day # 1 status post ORIF of a right periprosthetic femur fracture. Patient also noted to have a right ankle fracture of the lateral and posterior malleolus. Ankle has been treated nonoperatively. We will get patient a Cam walking boot for the right ankle. She continues to be nonweightbearing to the right lower extremity. Surgical wounds well approximated to the right femur. Patient notes pain and swelling to the right leg. She wishes to be discharged tomorrow home with her . Allergies:     Allergies as of 11/06/2021 - Fully Reviewed 11/06/2021   Allergen Reaction Noted    Sulfa antibiotics Itching 09/06/2011    Adhesive tape Rash and Other (See Comments) 08/23/2019    Hydrocodone-acetaminophen Nausea And Vomiting 08/20/2019    Vicodin [hydrocodone-acetaminophen] Nausea And Vomiting 05/21/2012     Current Inpatient Medications:  Current Facility-Administered Medications: rivaroxaban (XARELTO) tablet 10 mg, 10 mg, Oral, Daily  PARoxetine (PAXIL) tablet 40 mg, 40 mg, Oral, Daily  rOPINIRole (REQUIP) tablet 1 mg, 1 mg, Oral, Nightly  diphenhydrAMINE (BENADRYL) injection 25 mg, 25 mg, IntraVENous, Once  0.9 % sodium chloride infusion, , IntraVENous, Continuous  sodium chloride flush 0.9 % injection 5-40 mL, 5-40 mL, IntraVENous, 2 times per day  sodium chloride flush 0.9 % injection 5-40 mL, 5-40 mL, IntraVENous, PRN  0.9 % sodium chloride infusion, 25 mL, IntraVENous, PRN  morphine (PF) injection 2 mg, 2 mg, IntraVENous, Q2H PRN **OR** [DISCONTINUED] morphine injection 4 mg, 4 mg, IntraVENous, Q2H PRN  ondansetron (ZOFRAN-ODT) disintegrating tablet 4 mg, 4 mg, Oral, Q8H PRN **OR** ondansetron (ZOFRAN) injection 4 mg, 4 mg, IntraVENous, Q6H PRN  oxyCODONE-acetaminophen (PERCOCET) 5-325 MG per tablet 1 tablet, 1 tablet, Oral, Q4H PRN  oxyCODONE-acetaminophen (PERCOCET) 5-325 MG per tablet 2 tablet, 2 tablet, Oral, Q4H PRN  glucose (GLUTOSE) 40 % oral gel 15 g, 15 g, Oral, PRN  dextrose 50 % IV solution, 12.5 g, IntraVENous, PRN  glucagon (rDNA) injection 1 mg, 1 mg, IntraMUSCular, PRN  dextrose 5 % solution, 100 mL/hr, IntraVENous, PRN  insulin lispro (HUMALOG) injection vial 0-6 Units, 0-6 Units, SubCUTAneous, TID WC  insulin lispro (HUMALOG) injection vial 0-3 Units, 0-3 Units, SubCUTAneous, Nightly  [Held by provider] hydrALAZINE (APRESOLINE) tablet 25 mg, 25 mg, Oral, 3 times per day  HYDROmorphone (DILAUDID) injection 0.5 mg, 0.5 mg, IntraVENous, Q2H PRN  cyclobenzaprine (FLEXERIL) tablet 10 mg, 10 mg, Oral, TID PRN    REVIEW OF SYSTEMS:  Constitutional: Denies any fever, chills. Derm: Denies any rash or skin color change. Musculoskeletal: Denies numbness and tingling right lower extremity, Pain to right leg. Neuro: Denies any dizziness, paresthesia or weakness. OBJECTIVE    Patient Vitals for the past 24 hrs:   BP Temp Temp src Pulse Resp SpO2   11/08/21 0815 119/66 97.6 °F (36.4 °C) Oral 97 16 96 %   11/08/21 0415 124/74 98.6 °F (37 °C) -- 95 16 92 %   11/08/21 0115 110/64 -- -- -- 16 --   11/07/21 2315 134/70 -- -- 104 16 98 %   11/07/21 2100 134/84 -- -- 115 16 92 %   11/07/21 1630 (!) 142/77 98 °F (36.7 °C) Oral 98 16 94 %   11/07/21 1300 (!) 106/58 98.2 °F (36.8 °C) Oral 100 16 94 %   11/07/21 1202 (!) 104/58 98 °F (36.7 °C) Oral 104 16 93 %   11/07/21 1130 130/67 98.1 °F (36.7 °C) Oral 96 16 93 %   11/07/21 1100 128/66 98 °F (36.7 °C) Oral 98 16 94 %   11/07/21 1045 124/62 98.1 °F (36.7 °C) Oral 98 16 94 %     INTAKE/OUTPUT:    Intake/Output Summary (Last 24 hours) at 11/8/2021 1320  Last data filed at 11/8/2021 0422  Gross per 24 hour   Intake 2429.59 ml   Output 1850 ml   Net 579.59 ml     I/O last 3 completed shifts: In: 3229.6 [P.O.:500; I.V.:2729.6]  Out: 1850 [Urine:1850]    PHYSICAL EXAM:  General appearance:  Alert and oriented x 3. No apparent distress, appears stated age and cooperative.    Musculoskeletal: Lower E:  Denies calf pain to palpation. Improving range of motion without deformity. Pt can flex and extend right toes. Right leg drsg dry and intact. No redness or drainage noted from incision site. Skin: Skin color normal.  No rashes or lesions. Neurologic:  Neurovascularly intact without any focal sensory/motor deficits. Sensation intact. Data  CBC:   Lab Results   Component Value Date    WBC 11.7 11/06/2021    HGB 15.0 11/07/2021     11/06/2021     BMP:    Lab Results   Component Value Date     11/07/2021    K 4.6 11/07/2021    K 4.0 11/06/2021     11/07/2021    CO2 24 11/07/2021    BUN 8 11/07/2021    CREATININE 0.5 11/07/2021    CALCIUM 9.0 11/07/2021    GLUCOSE 182 11/07/2021    GLUCOSE 121 03/26/2019     Uric Acid:  No components found for: URIC  PT/INR:  No results found for: PROTIME, INR  Troponin:  No results found for: TROPONINI  Urine Culture:  No components found for: CURINE    ASSESSMENT:  Active Hospital Problems    Diagnosis Date Noted    Elevated blood pressure reading [R03.0] 11/06/2021    Magda-prosthetic supracondylar fracture of femur [T96. Hubert Camargo [address] 656 11/06/2021    Malignant neoplasm of overlapping sites of left breast in female, estrogen receptor positive ([facility] [address] [X42.363, Z17.0] 06/15/2020    Type 2 diabetes mellitus without complication, without long-term current use of insulin ([address] [E11.9]        PLAN  1. Dry drsg changes as needed   2. NWB right lower extremity  3. PT/OT to eval and treat   4. Continue current medical management   5. Planning for discharge to home tomorrow.   Patient is interested in home health PT      Electronically signed by JERI Troncoso CNP on 11/8/2021 at 10:39 AM

## 2021-11-08 NOTE — PROGRESS NOTES
Brit[address] 58  INPATIENT OCCUPATIONAL THERAPY  [facility] ORTHOPEDICS 7K  EVALUATION    Time:   Time In: 866  Time Out: 1356  Timed Code Treatment Minutes: 23 Minutes  Minutes: 31          Date: 2021  Patient Name: Raghu Bernabe,   Gender: female      MRN: [id number]  : 1970  (44 y.o.)  Referring Practitioner: Barbara Motley MD  Diagnosis: Closed Displaced Fracture of Distal Epiphysis of Right Femur  Additional Pertinent Hx: Per HPI: \"The patient is a 44 y.o. female  who presents with with pain to the right lower extremity. Had a history of a fall. Pain is localized around the ankle and knee. She was evaluated emergency department diagnosed with right supracondylar femur fracture as well as a right bimalleolar ankle fracture in the form of a lateral malleolus and posterior malleolus. \" Pt is now s/p Open treatment right supracondylar femur fracture without intercondylar extension  (DOS: 21). Restrictions/Precautions:  Restrictions/Precautions: Weight Bearing, General Precautions, Fall Risk  Right Lower Extremity Weight Bearing: Non Weight Bearing  Position Activity Restriction  Other position/activity restrictions: s/p R femur ORIF, R bimalleolar ankle fracture (non-op)    Subjective  Chart Reviewed: Yes, Orders, Progress Notes, History and Physical, Operative Notes  Patient assessed for rehabilitation services?: Yes    Subjective: RN okayed OT session. Upon arrival patient was lying in bed. Pt was agreeable to OT session.     Pain:  Pain Assessment  Patient Currently in Pain: Yes  Pain Assessment: 0-10  Pain Level: 9  Pain Type: Surgical pain; Acute pain  Pain Location: Leg  Pain Orientation: Right    Vitals: Vitals not assessed per clinical judgement, see nursing flowsheet    Social/Functional History:  Lives With: Spouse, Daughter (STEFAN)  Type of Home: House  Home Layout: Two level, Able to Live on Main level with bedroom/bathroom  Home Access: Stairs to enter without rails  Entrance Stairs - Number of Steps: 1 + 1  Home Equipment:  (no DME PTA)   Bathroom Shower/Tub: Walk-in shower, Shower chair with back  Bathroom Toilet: Handicap height  Bathroom Accessibility: Accessible       ADL Assistance: Independent  Homemaking Assistance: Independent  Homemaking Responsibilities: Yes  Ambulation Assistance: Independent  Transfer Assistance: Independent    Active : Yes  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 Toileting: Stand By Assistance. Toilet Transfer: Stand By Assistance. [place] BALANCE:  Sitting Balance:  Supervision. Standing Balance: Stand By Assistance. BED MOBILITY:  Supine to Sit: Minimal Assistance    Sit to Supine: Minimal Assistance    Scooting: Contact Guard Assistance      TRANSFERS:  Sit to Stand:  Contact Guard Assistance. Stand to Sit: Contact Guard Assistance. FUNCTIONAL MOBILITY:  Assistive Device: Walker  Assist Level:  Contact Guard Assistance. Distance: To and from bathroom  Slow pace, No LOB, Maintained R LE NWB. Activity Tolerance:  Patient tolerance of  treatment: good. Assessment:  Assessment: This 44year old female is s/p fall from bleachers at her daughter's sports game. Pt is now NWB of RLE. Pt requires skilled OT intervention to increase indep and endurance with all self cares, transfers, mobility, and IADLs while maintaining NWB of RLE to return to Indep PLOF. Without skilled OT intervention pt is at increased risk for falls and caregiver burden.   Performance deficits / Impairments: Decreased functional mobility , Decreased ADL status, Decreased strength, Decreased endurance, Decreased high-level IADLs, Decreased balance  Prognosis: Good  REQUIRES OT FOLLOW UP: Yes    Treatment Initiated: Treatment and education initiated within context of evaluation. Evaluation time included review of current medical information, gathering information related to past medical, social and functional history, completion of standardized testing, formal and informal observation of tasks, assessment of data and development of plan of care and goals. Treatment time included skilled education and facilitation of tasks to increase safety and independence with ADL's for improved functional independence and quality of life. Discharge Recommendations:  Continue to assess pending progress, Home with Home health OT    Patient Education:  OT Education: Plan of Care, OT Role, Precautions, ADL Adaptive Strategies, Transfer Training    Equipment Recommendations:  Equipment Needed: Yes  Other: Walker and W/c    Plan:  Times per week: 6x  Current Treatment Recommendations: Strengthening, Functional Mobility Training, Balance Training, Endurance Training, Safety Education & Training, Self-Care / ADL, Equipment Evaluation, Education, & procurement, Home Management Training, Patient/Caregiver Education & Training. See long-term goal time frame for expected duration of plan of care. If no long-term goals established, a short length of stay is anticipated. Goals:  Patient goals : Go Home  Short term goals  Time Frame for Short term goals: Until disharge  Short term goal 1: Pt will complete BUE moderate strengthening exercises with min vcs for technique to increase indep and endurance with all self cares and transfers. Short term goal 2: Pt will complete standing tolerance x 3 minutes with Mod Indep to increase indep with clothing management.   Short term goal 3: Pt will complete functional mobility short distances only with SBA while maintaining RLE NWB to increase indep with toileting. Short term goal 4: Pt will complete LB dressing with LHAE and min A to increase indep and endurance within home environment. Following session, patient left in safe position with all fall risk precautions in place.

## 2021-11-08 NOTE — PROGRESS NOTES
Physical Therapy  Date: 2021  Patient Name: Mack Meek        MRN: [id number]    Account Number: [de-identified]  : 1970  (44 y.o.)  Gender: female           Mack Meek requires a standard wheelchair to complete bathing, toileting, dressing and grooming tasks. These tasks cannot be completed by utilizing a cane or a walker secondary to impaired ambulation and mobility. Mack Meek is able to safely maneuver a standard wheelchair in the home.   Linda Zepeda, PT, DPT

## 2021-11-09 ENCOUNTER — TELEPHONE (OUTPATIENT)
Dept: FAMILY MEDICINE CLINIC | Age: 51
End: 2021-11-09

## 2021-11-09 VITALS
BODY MASS INDEX: 41.99 KG/M2 | TEMPERATURE: 97.4 F | HEART RATE: 102 BPM | WEIGHT: 237 LBS | HEIGHT: 63 IN | DIASTOLIC BLOOD PRESSURE: 63 MMHG | OXYGEN SATURATION: 93 % | RESPIRATION RATE: 16 BRPM | SYSTOLIC BLOOD PRESSURE: 126 MMHG

## 2021-11-09 LAB
GLUCOSE BLD-MCNC: 152 MG/DL (ref 70–108)
GLUCOSE BLD-MCNC: 153 MG/DL (ref 70–108)

## 2021-11-09 PROCEDURE — 6370000000 HC RX 637 (ALT 250 FOR IP): Performed by: ORTHOPAEDIC SURGERY

## 2021-11-09 PROCEDURE — 2580000003 HC RX 258: Performed by: ORTHOPAEDIC SURGERY

## 2021-11-09 PROCEDURE — 97535 SELF CARE MNGMENT TRAINING: CPT

## 2021-11-09 PROCEDURE — 97530 THERAPEUTIC ACTIVITIES: CPT

## 2021-11-09 PROCEDURE — 82948 REAGENT STRIP/BLOOD GLUCOSE: CPT

## 2021-11-09 RX ADMIN — CYCLOBENZAPRINE 10 MG: 10 TABLET, FILM COATED ORAL at 08:08

## 2021-11-09 RX ADMIN — SODIUM CHLORIDE, PRESERVATIVE FREE 10 ML: 5 INJECTION INTRAVENOUS at 08:09

## 2021-11-09 RX ADMIN — INSULIN LISPRO 1 UNITS: 100 INJECTION, SOLUTION INTRAVENOUS; SUBCUTANEOUS at 08:10

## 2021-11-09 RX ADMIN — OXYCODONE AND ACETAMINOPHEN 2 TABLET: 5; 325 TABLET ORAL at 08:28

## 2021-11-09 RX ADMIN — OXYCODONE AND ACETAMINOPHEN 2 TABLET: 5; 325 TABLET ORAL at 01:01

## 2021-11-09 RX ADMIN — OXYCODONE AND ACETAMINOPHEN 2 TABLET: 5; 325 TABLET ORAL at 05:16

## 2021-11-09 ASSESSMENT — PAIN SCALES - GENERAL
PAINLEVEL_OUTOF10: 10
PAINLEVEL_OUTOF10: 7
PAINLEVEL_OUTOF10: 8
PAINLEVEL_OUTOF10: 4
PAINLEVEL_OUTOF10: 8

## 2021-11-09 NOTE — PROGRESS NOTES
Orthopaedic Progress Note      SUBJECTIVE   Ms. Sierra Carvajal is post op day # 2 status post ORIF of a right periprosthetic femur fracture. Patient also noted to have a right ankle fracture of the lateral and posterior malleolus. Ankle has been treated nonoperatively. We will get patient a Cam walking boot for the right ankle. She continues to be nonweightbearing to the right lower extremity. Surgical wounds well approximated to the right femur. Patient notes pain and swelling to the right leg. She wishes to be discharged today home with her . Allergies:     Allergies as of 11/06/2021 - Fully Reviewed 11/06/2021   Allergen Reaction Noted    Sulfa antibiotics Itching 09/06/2011    Adhesive tape Rash and Other (See Comments) 08/23/2019    Hydrocodone-acetaminophen Nausea And Vomiting 08/20/2019    Vicodin [hydrocodone-acetaminophen] Nausea And Vomiting 05/21/2012     Current Inpatient Medications:  Current Facility-Administered Medications: acetaminophen (TYLENOL) tablet 650 mg, 650 mg, Oral, Q4H PRN  rivaroxaban (XARELTO) tablet 10 mg, 10 mg, Oral, Daily  PARoxetine (PAXIL) tablet 40 mg, 40 mg, Oral, Daily  rOPINIRole (REQUIP) tablet 1 mg, 1 mg, Oral, Nightly  diphenhydrAMINE (BENADRYL) injection 25 mg, 25 mg, IntraVENous, Once  0.9 % sodium chloride infusion, , IntraVENous, Continuous  sodium chloride flush 0.9 % injection 5-40 mL, 5-40 mL, IntraVENous, 2 times per day  sodium chloride flush 0.9 % injection 5-40 mL, 5-40 mL, IntraVENous, PRN  0.9 % sodium chloride infusion, 25 mL, IntraVENous, PRN  ondansetron (ZOFRAN-ODT) disintegrating tablet 4 mg, 4 mg, Oral, Q8H PRN **OR** ondansetron (ZOFRAN) injection 4 mg, 4 mg, IntraVENous, Q6H PRN  oxyCODONE-acetaminophen (PERCOCET) 5-325 MG per tablet 1 tablet, 1 tablet, Oral, Q4H PRN  oxyCODONE-acetaminophen (PERCOCET) 5-325 MG per tablet 2 tablet, 2 tablet, Oral, Q4H PRN  glucose (GLUTOSE) 40 % oral gel 15 g, 15 g, Oral, PRN  dextrose 50 % IV solution, 12.5 g, IntraVENous, PRN  glucagon (rDNA) injection 1 mg, 1 mg, IntraMUSCular, PRN  dextrose 5 % solution, 100 mL/hr, IntraVENous, PRN  insulin lispro (HUMALOG) injection vial 0-6 Units, 0-6 Units, SubCUTAneous, TID WC  insulin lispro (HUMALOG) injection vial 0-3 Units, 0-3 Units, SubCUTAneous, Nightly  HYDROmorphone (DILAUDID) injection 0.5 mg, 0.5 mg, IntraVENous, Q2H PRN  cyclobenzaprine (FLEXERIL) tablet 10 mg, 10 mg, Oral, TID PRN    REVIEW OF SYSTEMS:  Constitutional: Denies any fever, chills. Derm: Denies any rash or skin color change. Musculoskeletal: Denies numbness and tingling RLE, Pain to Rleg. Neuro: Denies any dizziness, paresthesia or weakness. OBJECTIVE    Patient Vitals for the past 24 hrs:   BP Temp Temp src Pulse Resp SpO2   11/09/21 0800 126/63 97.4 °F (36.3 °C) Oral 102 16 93 %   11/09/21 0500 132/83 98.5 °F (36.9 °C) Oral 111 15 90 %   11/09/21 0045 -- 99.7 °F (37.6 °C) -- -- -- --   11/08/21 2000 110/66 100.7 °F (38.2 °C) Oral 118 16 95 %   11/08/21 1600 118/70 98 °F (36.7 °C) Oral 94 16 --     INTAKE/OUTPUT:    Intake/Output Summary (Last 24 hours) at 11/9/2021 1200  Last data filed at 11/8/2021 1747  Gross per 24 hour   Intake 240 ml   Output --   Net 240 ml     I/O last 3 completed shifts: In: 240 [P.O.:240]  Out: -     PHYSICAL EXAM:  General appearance:  Alert and oriented x 3. No apparent distress, appears stated age and cooperative. Musculoskeletal: RLE: Denies calf pain to palpation. Improving range of motion without deformity. Pt can flex and extend right toes. Right leg drsg dry and intact. No redness or drainage noted from incision site. Skin: Skin color normal.  No rashes or lesions. Neurologic:  Neurovascularly intact without any focal sensory/motor deficits. Sensation intact.        Data  CBC:   Lab Results   Component Value Date    WBC 11.7 11/06/2021    HGB 15.0 11/07/2021     11/06/2021     BMP:    Lab Results   Component Value Date     11/07/2021 K 4.6 11/07/2021    K 4.0 11/06/2021     11/07/2021    CO2 24 11/07/2021    BUN 8 11/07/2021    CREATININE 0.5 11/07/2021    CALCIUM 9.0 11/07/2021    GLUCOSE 182 11/07/2021    GLUCOSE 121 03/26/2019     Uric Acid:  No components found for: URIC  PT/INR:  No results found for: PROTIME, INR  Troponin:  No results found for: TROPONINI  Urine Culture:  No components found for: CURINE    ASSESSMENT:  Active Hospital Problems    Diagnosis Date Noted    Elevated blood pressure reading [R03.0] 11/06/2021    Magda-prosthetic supracondylar fracture of femur [Q67. Tanner Loweryida Edy Gold [address]56 11/06/2021    Malignant neoplasm of overlapping sites of left breast in female, estrogen receptor positive [facility] [W49.534, Z17.0] 06/15/2020    Type 2 diabetes mellitus without complication, without long-term current use of insulin ([facility] [E11.9]        PLAN  1. Dry drsg changes as needed           2. NWB right lower extremity  3. PT/OT to eval and treat   4. Continue current medical management   5. Planning for discharge to home today  6. F/U 8 weeks       Electronically signed by JERI Grant CNP on 11/9/2021 at 11:59 AM

## 2021-11-09 NOTE — PROGRESS NOTES
Reviewed discharge instructions with pt and prescriptions given. Pt verbalized understanding. Pt's iv d/c's pt jorge well. Equipment rcvd from [place], he brought in a wheelchair and walker for her to be discharged home with. Per Dr. Lima Thurston office she already has a appt m[facility] Nov 24th at 1:10pm.  Incision care reviewed with pt and instructed not to apply any ointments to incision site. Pt transport here to take her to [facility] and Pt's  picking her up for discharge home.

## 2021-11-09 NOTE — CARE COORDINATION
11/9/21, 11:00 AM EST    Home with spouse; new W/C and std walker. Patient goals/plan/ treatment preferences discussed by  and . Patient goals/plan/ treatment preferences reviewed with patient/ family. Patient/ family verbalize understanding of discharge plan and are in agreement with goal/plan/treatment preferences. Understanding was demonstrated using the teach back method. AVS provided by RN at time of discharge, which includes all necessary medical information pertaining to the patients current course of illness, treatment, post-discharge goals of care, and treatment preferences.

## 2021-11-09 NOTE — TELEPHONE ENCOUNTER
----- Message from Sirisha García sent at 11/9/2021 11:42 AM EST -----  Subject: Message to Provider    QUESTIONS  Information for Provider? Gabrielle thornton [facility] called to schedule a one   week fu for patient who is being discharged from their care today   following a pelvic fracture.   ---------------------------------------------------------------------------  --------------  CALL BACK INFO  What is the best way for the office to contact you? OK to leave message on   voicemail  Preferred Call Back Phone Number? [phone]  ---------------------------------------------------------------------------  --------------  SCRIPT ANSWERS  Relationship to Patient? Third Party  Representative Name? [facility]ri[facility]  Did you have an injury or trauma within the past 3 days? No  Is your joint red or swollen? No  Are you having new onset numbness, tingling, or weakness with the pain? No  Did you have an injury or trauma within the past 14 days? No  Did your pain begin within the past week? No  Are you having fevers (100.4), chills, or sweats? No  (Is the patient requesting to be seen urgently for their symptoms?)? No  Have you recently (14 days) seen a provider for this issue? Yes  Have you been diagnosed with, awaiting test results for, or told that you   are suspected of having COVID-19 (Coronavirus)? (If patient has tested   negative or was tested as a requirement for work, school, or travel and   not based on symptoms, answer no)? No  Within the past two weeks have you developed any of the following symptoms   (answer no if symptoms have been present longer than 2 weeks or began   more than 2 weeks ago)? Fever or Chills, Cough, Shortness of breath or   difficulty breathing, Loss of taste or smell, Sore throat, Nasal   congestion, Sneezing or runny nose, Fatigue or generalized body aches   (answer no if pain is specific to a body part e.g. back pain), Diarrhea,   Headache?  No  Have you had close contact with someone with

## 2021-11-09 NOTE — PROGRESS NOTES
Reviewed chart for SBIRT per trauma service. Attempted, pt currently with medical staff. Unable to complete.

## 2021-11-09 NOTE — PROGRESS NOTES
Physician Progress Note      Avel Salas  CSN #:                  [id number]  :                       1970  ADMIT DATE:       2021 9:31 AM  DISCH DATE:  RESPONDING  PROVIDER #:        Zofia Larose MD          QUERY TEXT:    Dr Abel Torres, Dr Belen Carvalho, Dr Kirsten Cxo,    Patient admitted with BMI >40. If possible, please document in progress notes   and discharge summary if you are evaluating and /or treating any of the   following: The medical record reflects the following:  Risk Factors: BMI 41  Clinical Indicators: BMI 41  Treatment: BMI 41  Options provided:  -- Morbid obesity  -- Severe obesity  -- Other - I will add my own diagnosis  -- Disagree - Not applicable / Not valid  -- Disagree - Clinically unable to determine / Unknown  -- Refer to Clinical Documentation Reviewer    PROVIDER RESPONSE TEXT:    This patient has morbid obesity.     Query created by: Aide Mcclure on 2021 7:39 AM      Electronically signed by:  Zofia Larose MD 2021 7:40 AM

## 2021-11-09 NOTE — PROGRESS NOTES
[address] [facility]  Occupational Therapy  Daily Note  Time:   Time In: 2311  Time Out: 1023  Timed Code Treatment Minutes: 32 Minutes  Minutes: 32          Date: 2021  Patient Name: Paul Falcon,   Gender: female      Room: -24/024-A  MRN: [id number]  : 1970  (44 y.o.)  Referring Practitioner: India Barnes MD  Diagnosis: Closed Displaced Fracture of Distal Epiphysis of Right Femur  Additional Pertinent Hx: Per HPI: \"The patient is a 44 y.o. female  who presents with with pain to the right lower extremity. Had a history of a fall. Pain is localized around the ankle and knee. She was evaluated emergency department diagnosed with right supracondylar femur fracture as well as a right bimalleolar ankle fracture in the form of a lateral malleolus and posterior malleolus. \" Pt is now s/p Open treatment right supracondylar femur fracture without intercondylar extension  (DOS: 21). Restrictions/Precautions:  Restrictions/Precautions: Weight Bearing, General Precautions, Fall Risk  Right Lower Extremity Weight Bearing: Non Weight Bearing  Position Activity Restriction  Other position/activity restrictions: s/p R femur ORIF, R bimalleolar ankle fracture (non-op)     SUBJECTIVE: Pt laying in bed upon arrival, pt agreeable to OT session, RN gave verbal approval for session     PAIN: 5/10: R ankle     Vitals: Nurse checked vitals prior to session    COGNITION: WFL    ADL:   Upper Extremity Dressing: Modified Independent. with pt sitting at the EOB  Lower Extremity Dressing: Minimal Assistance. with donning around L ankle, with pt able to don from there with close SBA  Toileting: Stand By Assistance. for safety  Toilet Transfer: [address] with pt using grab bar to lower self. BALANCE:  Standing Balance: Contact Guard Assistance.  with pt standing at the sink to wash hands, with 1x of LOB due to pt NWB with pt able to release BUE from walker, with vc's and education with safety    BED MOBILITY:  Supine to Sit: Stand By Assistance with pt educated on using leg lifting due to pt having increased difficulty with initiating RLE to slide across bed, with pt able to complete with using gait belt to get to the edge  Sit to Supine: Stand By Assistance . TRANSFERS:  Sit to Stand:  Contact Guard Assistance. for safety due to NWB  Stand to Sit: Contact Guard Assistance. to EOB with vc's to reach back    FUNCTIONAL MOBILITY:  Assistive Device: Walker  Assist Level:  Contact Guard Assistance. Distance: To and from bathroom  With pt hopping and good adherence to NWB status     ASSESSMENT:     Activity Tolerance:  Patient tolerance of  treatment: good. Discharge Recommendations: Home with Home Health OT  Equipment Recommendations: Equipment Needed: Yes  Other: Dulcie Sicks and W/c  Plan: Times per week: 6x  Current Treatment Recommendations: Strengthening, Functional Mobility Training, Balance Training, Endurance Training, Safety Education & Training, Self-Care / ADL, Equipment Evaluation, Education, & procurement, Home Management Training, Patient/Caregiver Education & Training    Patient Education  Patient Education: Precautions and Assistive Device Safety    Goals  Short term goals  Time Frame for Short term goals: Until disharge  Short term goal 1: Pt will complete BUE moderate strengthening exercises with min vcs for technique to increase indep and endurance with all self cares and transfers. Short term goal 2: Pt will complete standing tolerance x 3 minutes with Mod Indep to increase indep with clothing management. Short term goal 3: Pt will complete functional mobility short distances only with SBA while maintaining RLE NWB to increase indep with toileting. Short term goal 4: Pt will complete LB dressing with LHAE and min A to increase indep and endurance within home environment.     Following session, patient left in safe position with all fall risk precautions in place.

## 2021-11-09 NOTE — DISCHARGE SUMMARY
or Vomiting  Qty: 30 tablet, Refills: 0         CONTINUE these medications which have NOT CHANGED    Details   Turmeric 400 MG CAPS Take by mouth      Omega-3 1000 MG CAPS Take by mouth daily      vitamin D (CHOLECALCIFEROL) 125 MCG (5000 UT) CAPS capsule Take 1 capsule by mouth daily  Qty: 90 capsule, Refills: 3      PARoxetine (PAXIL) 40 MG tablet Take 1 tablet by mouth daily  Qty: 90 tablet, Refills: 3      metFORMIN (GLUCOPHAGE-XR) 500 MG extended release tablet Take 2 tablets by mouth 2 times daily take 2 tablets by mouth every morning  Qty: 360 tablet, Refills: 3      rOPINIRole (REQUIP) 0.5 MG tablet Take 2 tablets by mouth nightly  Qty: 180 tablet, Refills: 3    Associated Diagnoses: Restless leg syndrome      vitamin C (ASCORBIC ACID) 500 MG tablet Take 500 mg by mouth daily      ZINC PO Take 1 tablet by mouth daily      magnesium oxide (MAG-OX) 400 MG tablet Take 400 mg by mouth daily      Glucosamine-Chondroitin (GLUCOSAMINE CHONDR COMPLEX PO) Take by mouth daily       vitamin E 400 UNIT capsule Take 400 Units by mouth 2 times daily      letrozole (FEMARA) 2.5 MG tablet Take 1 tablet by mouth every other day  Qty: 30 tablet, Refills: 0      promethazine (PHENERGAN) 25 MG tablet Take 1 tablet by mouth every 8 hours as needed for Nausea  Qty: 60 tablet, Refills: 0    Associated Diagnoses: Nausea      mometasone (ELOCON) 0.1 % cream Apply topically daily.   Qty: 45 g, Refills: 2    Associated Diagnoses: Guttate psoriasis      diclofenac sodium (VOLTAREN) 1 % GEL Apply 2 g topically 4 times daily  Qty: 150 g, Refills: 5      loratadine (CLARITIN) 10 MG tablet take 1 tablet by mouth once daily  Qty: 90 tablet, Refills: 3      fluticasone (FLONASE) 50 MCG/ACT nasal spray 1 spray by Nasal route daily  Qty: 3 Bottle, Refills: 3    Associated Diagnoses: Acute serous otitis media of left ear, recurrence not specified      albuterol sulfate HFA (PROAIR HFA) 108 (90 Base) MCG/ACT inhaler Inhale 2 puffs into the lungs

## 2021-11-09 NOTE — TELEPHONE ENCOUNTER
Called patient and she was being discharged at that time, she will call back or have hospital nurse call us to schedule her hospital follow up.

## 2021-11-10 ENCOUNTER — CARE COORDINATION (OUTPATIENT)
Dept: CASE MANAGEMENT | Age: 51
End: 2021-11-10

## 2021-11-10 NOTE — CARE COORDINATION
Care Transitions Outreach Attempt    Call within 2 business days of discharge: Yes   Attempted initial 24 hour transitional call to patient. Left VM to return call directly to [phone]    Patient: Oriinocente Tran Patient : 1970 MRN: [id number]    Last Discharge [facility]       Complaint Diagnosis Description Type Department Provider    21 Knee Injury Closed displaced fracture of distal epiphysis of right femur, initial encounter ([facility] [address] . .. ED to Hosp-Admission (Discharged) (ADMITTED) Wallace Enriquez MD; Margo Rodrigez,. .. Noted following upcoming appointments from discharge chart review:   [facility] follow up appointment(s): No future appointments.   Non-[facility] follow up appointment(s): na

## 2021-11-11 ENCOUNTER — TELEPHONE (OUTPATIENT)
Dept: FAMILY MEDICINE CLINIC | Age: 51
End: 2021-11-11

## 2021-11-11 ENCOUNTER — CARE COORDINATION (OUTPATIENT)
Dept: CASE MANAGEMENT | Age: 51
End: 2021-11-11

## 2021-11-11 DIAGNOSIS — S72.441A CLOSED DISPLACED FRACTURE OF DISTAL EPIPHYSIS OF RIGHT FEMUR, INITIAL ENCOUNTER (HCC): Primary | ICD-10-CM

## 2021-11-11 NOTE — CARE COORDINATION
[facility] Transitions Initial Follow Up Call    Call within 2 business days of discharge: Yes    Patient: Vamshi Ye Patient : 1970   MRN: [id number]  Reason for Admission: Closed displaced fracture of distal epiphysis of right femur  Discharge Date: 21 RARS: Readmission Risk Score: 12.7 ( )      Last Discharge [facility]       Complaint Diagnosis Description Type Department Provider    21 Knee Injury Closed displaced fracture of distal epiphysis of right femur, initial encounter ([facility] [address] . .. ED to Hosp-Admission (Discharged) (ADMITTED) Mehnaz Ceja MD; Mami Carbajal,... Spoke with Juliette, said she is ok, just tired. Her pain is controlled with medication. She fell yesterday, trying to swivel around with her walker, fell on her left side, kept her right leg up. She was not hurt but difficult with ambulation d/t NWB. Her incision is without signs of infection. Appetite and fluid intake is ok. Reviewed medications,  to  the Xarelto. She is not taking any supplements except for Vit D. Her FBS are fairly stable. Has not scheduled f/u with PCP, agreeable for me to assist.  Called and requested VV since she is having difficulty getting around. Did not have VV available but will send to  to see if can be switched to VV and will notify pt. Appt  @ 120. Also called [facility] for clarification for appt. Was told 8 wks and saw that has one on . [facility] confirmed the  appt time. Called Juliette and relayed the appt times. No other questions or concerns at this time. Will continue to follow.     Non-face-to-face services provided:  Scheduled appointment with PCP-  Scheduled appointment with Specialist-,   Obtained and reviewed discharge summary and/or continuity of care documents    Care Transitions 24 Hour Call    Schedule Follow Up Appointment with PCP: Completed  Do you have any ongoing symptoms?: No  Do you have a copy of your discharge instructions?: Yes  Do you have all of your prescriptions and are they filled?: Yes  Have you been contacted by a [address]?: No  Have you scheduled your follow up appointment?: Yes  How are you going to get to your appointment?: Car - family or friend to transport  Were you discharged with any Home Care or Post Acute Services: No  Do you feel like you have everything you need to keep you well at home?: Yes  Care Transitions Interventions     Transitions of Care Initial Call      Challenges to be reviewed by the provider   Additional needs identified to be addressed with provider: No  none             Method of communication with provider : none      Advance Care Planning:   Does patient have an Advance Directive: not on file. Was this a readmission? No  Patient stated reason for admission: fall  Patients top risk factors for readmission: lack of knowledge about disease, medical condition-DM, post op ORIF right femur and polypharmacy    Care Transition Nurse (CTN) contacted the patient by telephone to perform post hospital discharge assessment. Verified name and  with patient as identifiers. Provided introduction to self, and explanation of the CTN role. CTN reviewed discharge instructions, medical action plan and red flags with patient who verbalized understanding. Patient given an opportunity to ask questions and does not have any further questions or concerns at this time. Were discharge instructions available to patient? Yes. Reviewed appropriate site of care based on symptoms and resources available to patient including: PCP and Specialist. The patient agrees to contact the PCP office for questions related to their healthcare. Medication reconciliation was performed with patient, who verbalizes understanding of administration of home medications. Advised obtaining a 90-day supply of all daily and as-needed medications.      Covid Risk Education     Educated patient about risk for severe COVID-19 due to risk factors according to CDC guidelines. CTN reviewed discharge instructions, medical action plan and red flag symptoms with the patient who verbalized understanding. Discussed COVID vaccination status: Yes. Education provided on COVID-19 vaccination as appropriate. Discussed exposure protocols and quarantine with CDC Guidelines. Patient was given an opportunity to verbalize any questions and concerns and agrees to contact CTN or health care provider for questions related to their healthcare. Reviewed and educated patient on any new and changed medications related to discharge diagnosis. Was patient discharged with a pulse oximeter? No     CTN provided contact information. Plan for follow-up call in 5-7 days based on severity of symptoms and risk factors.   Plan for next call: symptom management-ORIF right femur and self management-FBS      Follow Up  Future Appointments   Date Time Provider Ryan Robbins   11/16/2021  1:20 PM JERI Galvan - CNP SRPX Jose Miguel MORRISON - Cristel Graham RN

## 2021-11-11 NOTE — TELEPHONE ENCOUNTER
----- Message from Rosalina Whelanam sent at 11/11/2021 11:00 AM EST -----  Subject: Message to Provider    QUESTIONS  Information for Provider? pt has appointment on 11/16 hospital follow up,   would like the appt to be virtual if possible due to Right femur fx,   please call patient if able to change to [employer] appt. Thank you  ---------------------------------------------------------------------------  --------------  CALL BACK INFO  What is the best way for the office to contact you? OK to leave message on   voicemail  Preferred Call Back Phone Number? [phone]  ---------------------------------------------------------------------------  --------------  SCRIPT ANSWERS  Relationship to Patient? Third Party  Representative Name?  Linda care coordinator

## 2021-11-16 ENCOUNTER — TELEPHONE (OUTPATIENT)
Dept: FAMILY MEDICINE CLINIC | Age: 51
End: 2021-11-16

## 2021-11-16 ENCOUNTER — TELEMEDICINE (OUTPATIENT)
Dept: FAMILY MEDICINE CLINIC | Age: 51
End: 2021-11-16
Payer: COMMERCIAL

## 2021-11-16 DIAGNOSIS — S72.441A CLOSED DISPLACED FRACTURE OF DISTAL EPIPHYSIS OF RIGHT FEMUR, INITIAL ENCOUNTER (HCC): Primary | ICD-10-CM

## 2021-11-16 DIAGNOSIS — R73.01 IMPAIRED FASTING BLOOD SUGAR: ICD-10-CM

## 2021-11-16 PROCEDURE — 99495 TRANSJ CARE MGMT MOD F2F 14D: CPT | Performed by: NURSE PRACTITIONER

## 2021-11-16 RX ORDER — BLOOD-GLUCOSE METER
1 EACH MISCELLANEOUS DAILY
Qty: 1 KIT | Refills: 0 | Status: SHIPPED | OUTPATIENT
Start: 2021-11-16

## 2021-11-16 RX ORDER — BLOOD SUGAR DIAGNOSTIC
1 STRIP MISCELLANEOUS DAILY
Qty: 100 EACH | Refills: 3 | Status: SHIPPED | OUTPATIENT
Start: 2021-11-16

## 2021-11-16 ASSESSMENT — ENCOUNTER SYMPTOMS: SHORTNESS OF BREATH: 0

## 2021-11-16 NOTE — PROGRESS NOTES
Post-Discharge Transitional Care Management Services or Hospital Follow Up      Del Constant   YOB: 1970    Date of Office Visit:  11/16/2021  Date of Hospital Admission: 11/6/21  Date of Hospital Discharge: 11/9/21  Readmission Risk Score(high >=14%. Medium >=10%):Readmission Risk Score: 12.7 ( )      Care management risk score Rising risk (score 2-5) and Complex Care (Scores >=6): 5     Non faceto face  following discharge, date last encounter closed (first attempt may have been earlier): 11/11/2021 11:26 AM 11/11/2021 11:26 AM    Call initiated 2 business days ofdischarge: Yes     Patient Active Problem List   Diagnosis    Type 2 diabetes mellitus without complication, without long-term current use of insulin ([facility] [address]    Primary insomnia    Malignant neoplasm of overlapping sites of left breast in female, estrogen receptor positive ([facility] [address]    Encounter for monitoring aromatase inhibitor therapy    Use of goserelin acetate (Zoladex)    Thrombocytosis    DUB (dysfunctional uterine bleeding)    Magda-prosthetic supracondylar fracture of femur    Elevated blood pressure reading       Allergies   Allergen Reactions    Sulfa Antibiotics Itching    Adhesive Tape Rash and Other (See Comments)     Blisters with prolonged use    Hydrocodone-Acetaminophen Nausea And Vomiting    Vicodin [Hydrocodone-Acetaminophen] Nausea And Vomiting       Medications listed as ordered at the time of discharge from hospital  @DISCHARGEMEDSLIST(<NOROUTINE> error)@      Medications marked \"taking\" at this time  No outpatient medications have been marked as taking for the 11/16/21 encounter (Telemedicine) with JERI Carrera CNP. Medications patient taking asof now reconciled against medications ordered at time of hospital discharge: Yes    Chief Complaint   Patient presents with    Leg Injury       Patient presents for transitional care virtual visit through doxy. me regarding right distal femur tablet by mouth daily as needed for Nausea or Vomiting 30 tablet 0    rivaroxaban (XARELTO) 10 MG TABS tablet Take 1 tablet by mouth every 24 hours 18 tablet 0    letrozole (FEMARA) 2.5 MG tablet Take 1 tablet by mouth every other day 30 tablet 0    promethazine (PHENERGAN) 25 MG tablet Take 1 tablet by mouth every 8 hours as needed for Nausea 60 tablet 0    mometasone (ELOCON) 0.1 % cream Apply topically daily.  45 g 2    Turmeric 400 MG CAPS Take by mouth      Omega-3 1000 MG CAPS Take by mouth daily      diclofenac sodium (VOLTAREN) 1 % GEL Apply 2 g topically 4 times daily 150 g 5    vitamin D (CHOLECALCIFEROL) 125 MCG (5000 UT) CAPS capsule Take 1 capsule by mouth daily 90 capsule 3    PARoxetine (PAXIL) 40 MG tablet Take 1 tablet by mouth daily 90 tablet 3    metFORMIN (GLUCOPHAGE-XR) 500 MG extended release tablet Take 2 tablets by mouth 2 times daily take 2 tablets by mouth every morning (Patient taking differently: Take 1,000 mg by mouth every evening take 4 tablets by mouth every morning) 360 tablet 3    loratadine (CLARITIN) 10 MG tablet take 1 tablet by mouth once daily 90 tablet 3    rOPINIRole (REQUIP) 0.5 MG tablet Take 2 tablets by mouth nightly 180 tablet 3    fluticasone (FLONASE) 50 MCG/ACT nasal spray 1 spray by Nasal route daily 3 Bottle 3    vitamin C (ASCORBIC ACID) 500 MG tablet Take 500 mg by mouth daily      ZINC PO Take 1 tablet by mouth daily      magnesium oxide (MAG-OX) 400 MG tablet Take 400 mg by mouth daily      Glucosamine-Chondroitin (GLUCOSAMINE CHONDR COMPLEX PO) Take by mouth daily  (Patient not taking: Reported on 11/11/2021)      vitamin E 400 UNIT capsule Take 400 Units by mouth 2 times daily      albuterol sulfate HFA (PROAIR HFA) 108 (90 Base) MCG/ACT inhaler Inhale 2 puffs into the lungs every 6 hours as needed for Wheezing 1 Inhaler 3    Glucose Blood (BLOOD GLUCOSE TEST STRIPS) STRP Contour EZ test strips and lancets test daily E11.9 50 strip 11     No facility-administered encounter medications on file as of 11/16/2021. No orders of the defined types were placed in this encounter. Medical Decision Making: moderate complexity    Outpatient Encounter Medications as of 11/16/2021   Medication Sig Dispense Refill    Blood Glucose Monitoring Suppl (ACCU-CHEK GUIDE) w/Device KIT 1 each by Does not apply route daily 1 kit 0    blood glucose test strips (ACCU-CHEK GUIDE) strip 1 each by In Vitro route daily As needed. 100 each 3    cyclobenzaprine (FLEXERIL) 10 MG tablet Take 1 tablet by mouth 3 times daily as needed for Muscle spasms 40 tablet 0    ondansetron (ZOFRAN) 4 MG tablet Take 1 tablet by mouth daily as needed for Nausea or Vomiting 30 tablet 0    rivaroxaban (XARELTO) 10 MG TABS tablet Take 1 tablet by mouth every 24 hours 18 tablet 0    letrozole (FEMARA) 2.5 MG tablet Take 1 tablet by mouth every other day 30 tablet 0    promethazine (PHENERGAN) 25 MG tablet Take 1 tablet by mouth every 8 hours as needed for Nausea 60 tablet 0    mometasone (ELOCON) 0.1 % cream Apply topically daily.  45 g 2    Turmeric 400 MG CAPS Take by mouth      Omega-3 1000 MG CAPS Take by mouth daily      diclofenac sodium (VOLTAREN) 1 % GEL Apply 2 g topically 4 times daily 150 g 5    vitamin D (CHOLECALCIFEROL) 125 MCG (5000 UT) CAPS capsule Take 1 capsule by mouth daily 90 capsule 3    PARoxetine (PAXIL) 40 MG tablet Take 1 tablet by mouth daily 90 tablet 3    metFORMIN (GLUCOPHAGE-XR) 500 MG extended release tablet Take 2 tablets by mouth 2 times daily take 2 tablets by mouth every morning (Patient taking differently: Take 1,000 mg by mouth every evening take 4 tablets by mouth every morning) 360 tablet 3    loratadine (CLARITIN) 10 MG tablet take 1 tablet by mouth once daily 90 tablet 3    rOPINIRole (REQUIP) 0.5 MG tablet Take 2 tablets by mouth nightly 180 tablet 3    fluticasone (FLONASE) 50 MCG/ACT nasal spray 1 spray by Nasal route daily 3 Bottle 3  vitamin C (ASCORBIC ACID) 500 MG tablet Take 500 mg by mouth daily      ZINC PO Take 1 tablet by mouth daily      magnesium oxide (MAG-OX) 400 MG tablet Take 400 mg by mouth daily      Glucosamine-Chondroitin (GLUCOSAMINE CHONDR COMPLEX PO) Take by mouth daily  (Patient not taking: Reported on 11/11/2021)      vitamin E 400 UNIT capsule Take 400 Units by mouth 2 times daily      albuterol sulfate HFA (PROAIR HFA) 108 (90 Base) MCG/ACT inhaler Inhale 2 puffs into the lungs every 6 hours as needed for Wheezing 1 Inhaler 3    [DISCONTINUED] Glucose Blood (BLOOD GLUCOSE TEST STRIPS) STRP Contour EZ test strips and lancets test daily E11.9 50 strip 11     No facility-administered encounter medications on file as of 11/16/2021. No orders of the defined types were placed in this encounter. Continue medications as prescribed.  Educational information on above diagnosis  provided per AVS.

## 2021-11-18 ENCOUNTER — CARE COORDINATION (OUTPATIENT)
Dept: CASE MANAGEMENT | Age: 51
End: 2021-11-18

## 2021-11-18 NOTE — CARE COORDINATION
Care Transitions Outreach Attempt    Call within 2 business days of discharge: Yes   Attempted to reach patient for subsequent transitional call. VM left to return call to [phone]. Patient: Temitope Roth Patient : 1970 MRN: [id number]    Last Discharge [facility]       Complaint Diagnosis Description Type Department Provider    21 Knee Injury Closed displaced fracture of distal epiphysis of right femur, initial encounter ([facility] [address] . .. ED to Hosp-Admission (Discharged) (ADMITTED) Tierney Frederick MD; Gorge Finnegan,. .. Noted following upcoming appointments from discharge chart review:   [facility] follow up appointment(s): No future appointments.   Non-[facility] follow up appointment(s): Dr Nani Paul

## 2021-11-22 ENCOUNTER — CARE COORDINATION (OUTPATIENT)
Dept: CASE MANAGEMENT | Age: 51
End: 2021-11-22

## 2021-11-22 NOTE — CARE COORDINATION
[address] Transitions Follow Up Call    2021    Patient: Gabi Baldwin  Patient : 1970   MRN: [id number]  Reason for Admission: Closed displaced fracture of distal epiphysis of right femur  Discharge Date: 21 RARS: Readmission Risk Score: 12.7 ( )         Spoke with Juliette, said she is doing ok. Trying to spread out the pain meds to every 6 hrs. Last time she took today was 10 and is starting to have pain so will take one soon. She remains NWB and is using the wheelchair more since hopping on her left leg is causing some pain from her torn meniscus. Her incision is without signs of infection. She will see ortho on Wed. Her blood sugar was 183 last night and have been running on the higher side. She is going to call PCP today regarding this. She is not eating anything too sugary and doesn't know why it is still elevated. No other questions or concerns at this time. Will continue to follow. Care Transitions Subsequent and Final Call    Subsequent and Final Calls  Do you have any ongoing symptoms?: No  Have your medications changed?: No  Do you have any questions related to your medications?: No  Do you currently have any active services?: No  Do you have any needs or concerns that I can assist you with?: No  Identified Barriers: Lack of Education, Impairment  Care Transitions Interventions  Other Interventions:       Care Transitions Follow Up Call    Needs to be reviewed by the provider   Additional needs identified to be addressed with provider: No  none             Method of communication with provider : none      Care Transition Nurse (CTN) contacted the patient by telephone to follow up after admission on . Verified name and  with patient as identifiers. Addressed changes since last contact: none  Discussed follow-up appointments. Advance Care Planning:   Does patient have an Advance Directive: not on file.      CTN reviewed discharge instructions, medical action plan and red flags with patient and discussed any barriers to care and/or understanding of plan of care after discharge. Discussed appropriate site of care based on symptoms and resources available to patient including: PCP and Specialist. The patient agrees to contact the PCP office for questions related to their healthcare. Patients top risk factors for readmission: lack of knowledge about disease, medical condition-s/p ORIF right femur, DM and polypharmacy  Interventions to address risk factors: Scheduled appointment with Specialist-11/24      [facility] follow up appointment(s): Dr Crump Childress 11/24    CTN provided contact information for future needs. Plan for follow-up call in 7-10 days based on severity of symptoms and risk factors. Plan for next call: symptom management-pain management, DM        Follow Up  No future appointments.     Frankey Rider, RN

## 2021-11-23 ENCOUNTER — PATIENT MESSAGE (OUTPATIENT)
Dept: FAMILY MEDICINE CLINIC | Age: 51
End: 2021-11-23

## 2021-11-26 DIAGNOSIS — H65.02 ACUTE SEROUS OTITIS MEDIA OF LEFT EAR, RECURRENCE NOT SPECIFIED: ICD-10-CM

## 2021-11-26 RX ORDER — FLUTICASONE PROPIONATE 50 MCG
SPRAY, SUSPENSION (ML) NASAL
Qty: 48 G | Refills: 3 | Status: SHIPPED | OUTPATIENT
Start: 2021-11-26

## 2021-12-02 ENCOUNTER — CARE COORDINATION (OUTPATIENT)
Dept: CASE MANAGEMENT | Age: 51
End: 2021-12-02

## 2021-12-02 NOTE — CARE COORDINATION
[facility] Transitions Follow Up Call-final    2021    Patient: Balaji Parmar  Patient : 1970   MRN: [id number]  Reason for Admission: Closed displaced fracture of distal epiphysis of right femur  Discharge Date: 21 RARS: Readmission Risk Score: 12.7 ( )         Spoke with Juliette, said she is a lot better. Her pain is manageable \"for the most part. \"  She saw OIO last week. Her incision looks good. Her FBS was 114 yesterday and 130 today. She is going to call PCP for a VV to see if she can take something else to get her FBS lower. Said she is trying with diet but still higher than what she would like. She will be NWB until she sees Dr Marcelo Antonio early Feb.  Final call, no other questions or concerns at this time. Care Transitions Subsequent and Final Call    Subsequent and Final Calls  Do you have any ongoing symptoms?: No  Have your medications changed?: No  Do you have any questions related to your medications?: No  Do you currently have any active services?: No  Do you have any needs or concerns that I can assist you with?: No  Identified Barriers: Impairment, Lack of Education  Care Transitions Interventions  Other Interventions:       Care Transitions Follow Up Call    Needs to be reviewed by the provider   Additional needs identified to be addressed with provider: No  none             Method of communication with provider : none      Care Transition Nurse (CTN) contacted the patient by telephone to follow up after admission on . Verified name and  with patient as identifiers. Addressed changes since last contact: none  Discussed follow-up appointments. Advance Care Planning:   Does patient have an Advance Directive: not on file. CTN reviewed discharge instructions, medical action plan and red flags with patient and discussed any barriers to care and/or understanding of plan of care after discharge.  Discussed appropriate site of care based on symptoms and resources available to patient including: PCP and Specialist. The patient agrees to contact the PCP office for questions related to their healthcare. Patients top risk factors for readmission: lack of knowledge about disease  medical condition-s/p ORIF right femur, DM  polypharmacy  Interventions to address risk factors: calling PCP      Non-[facility] follow up appointment(s): Dr Esther Wise, early Feb 2022    CTN provided contact information for future needs. No further follow-up call indicated based on severity of symptoms and risk factors. Follow Up  No future appointments.     Sabrina Brewster RN

## 2021-12-06 RX ORDER — LORATADINE 10 MG/1
TABLET ORAL
Qty: 90 TABLET | Refills: 3 | Status: SHIPPED | OUTPATIENT
Start: 2021-12-06

## 2021-12-08 DIAGNOSIS — G47.00 INSOMNIA, UNSPECIFIED TYPE: ICD-10-CM

## 2021-12-09 ENCOUNTER — E-VISIT (OUTPATIENT)
Dept: OTHER | Facility: CLINIC | Age: 51
End: 2021-12-09
Payer: COMMERCIAL

## 2021-12-09 DIAGNOSIS — E11.9 DM TYPE 2, GOAL HBA1C < 7.5% (HCC): Primary | ICD-10-CM

## 2021-12-09 DIAGNOSIS — Z85.3 HISTORY OF BREAST CANCER: ICD-10-CM

## 2021-12-09 DIAGNOSIS — S82.891G CLOSED FRACTURE OF RIGHT ANKLE WITH DELAYED HEALING, SUBSEQUENT ENCOUNTER: ICD-10-CM

## 2021-12-09 PROCEDURE — 3051F HG A1C>EQUAL 7.0%<8.0%: CPT | Performed by: NURSE PRACTITIONER

## 2021-12-09 PROCEDURE — 99213 OFFICE O/P EST LOW 20 MIN: CPT | Performed by: NURSE PRACTITIONER

## 2021-12-09 RX ORDER — ALPRAZOLAM 0.5 MG/1
TABLET ORAL
Qty: 90 TABLET | Refills: 0 | Status: SHIPPED | OUTPATIENT
Start: 2021-12-09 | End: 2022-02-18

## 2021-12-16 RX ORDER — EMPAGLIFLOZIN 25 MG/1
1 TABLET, FILM COATED ORAL DAILY
Qty: 30 TABLET | Refills: 5 | Status: SHIPPED | OUTPATIENT
Start: 2021-12-16 | End: 2021-12-21 | Stop reason: SDUPTHER

## 2021-12-21 ENCOUNTER — TELEPHONE (OUTPATIENT)
Dept: FAMILY MEDICINE CLINIC | Age: 51
End: 2021-12-21

## 2021-12-21 RX ORDER — EMPAGLIFLOZIN 25 MG/1
1 TABLET, FILM COATED ORAL DAILY
Qty: 30 TABLET | Refills: 5 | Status: SHIPPED | OUTPATIENT
Start: 2021-12-21 | End: 2021-12-27

## 2021-12-21 NOTE — TELEPHONE ENCOUNTER
Pt was prescribed Jardiance 25mg QD in an evisit. Medication was not sent to the pharmacy, sig changed. Pt has been trying to get the medication.  New rx escribed, pt aware
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
## 2021-12-22 ENCOUNTER — TELEPHONE (OUTPATIENT)
Dept: FAMILY MEDICINE CLINIC | Age: 51
End: 2021-12-22

## 2021-12-22 NOTE — TELEPHONE ENCOUNTER
Patient  Called in and has some concerns with the side effects of the jardiance that you just prescribed for her. Patient states that she is concerned with the side effects of UTI and yeast infections. I explained to the patient that with proper hydration and hygiene, the chances of this side effect is slim but still a possibility. Patient would prefer to be put on Ozempic. I explained the side effects of that medication and patient states that it the same side effects as her Metformin and feels like that is easier to manage. Please advise.

## 2021-12-23 NOTE — TELEPHONE ENCOUNTER
She will need an appointment in the office to transfer over to [address] [place] [address]. I also recommend that she continue to try Jardiance. Typically the urinary symptoms do dissipate. She may possibly try every other day short-term.

## 2021-12-27 ENCOUNTER — OFFICE VISIT (OUTPATIENT)
Dept: FAMILY MEDICINE CLINIC | Age: 51
End: 2021-12-27
Payer: COMMERCIAL

## 2021-12-27 VITALS
OXYGEN SATURATION: 96 % | DIASTOLIC BLOOD PRESSURE: 80 MMHG | HEART RATE: 120 BPM | SYSTOLIC BLOOD PRESSURE: 118 MMHG | TEMPERATURE: 98.9 F

## 2021-12-27 DIAGNOSIS — C50.919 MALIGNANT NEOPLASM OF FEMALE BREAST, UNSPECIFIED ESTROGEN RECEPTOR STATUS, UNSPECIFIED LATERALITY, UNSPECIFIED SITE OF BREAST (HCC): ICD-10-CM

## 2021-12-27 DIAGNOSIS — S72.441A CLOSED DISPLACED FRACTURE OF DISTAL EPIPHYSIS OF RIGHT FEMUR, INITIAL ENCOUNTER (HCC): ICD-10-CM

## 2021-12-27 DIAGNOSIS — E11.9 TYPE 2 DIABETES MELLITUS WITHOUT COMPLICATION, WITHOUT LONG-TERM CURRENT USE OF INSULIN (HCC): Primary | ICD-10-CM

## 2021-12-27 DIAGNOSIS — E66.01 CLASS 3 SEVERE OBESITY WITH SERIOUS COMORBIDITY AND BODY MASS INDEX (BMI) OF 40.0 TO 44.9 IN ADULT, UNSPECIFIED OBESITY TYPE (HCC): ICD-10-CM

## 2021-12-27 DIAGNOSIS — E11.9 DM TYPE 2, NOT AT GOAL (HCC): ICD-10-CM

## 2021-12-27 PROCEDURE — 99214 OFFICE O/P EST MOD 30 MIN: CPT | Performed by: NURSE PRACTITIONER

## 2021-12-27 PROCEDURE — 3051F HG A1C>EQUAL 7.0%<8.0%: CPT | Performed by: NURSE PRACTITIONER

## 2021-12-27 RX ORDER — OXYCODONE HYDROCHLORIDE AND ACETAMINOPHEN 5; 325 MG/1; MG/1
TABLET ORAL
COMMUNITY
Start: 2021-12-17

## 2021-12-27 SDOH — ECONOMIC STABILITY: FOOD INSECURITY: WITHIN THE PAST 12 MONTHS, YOU WORRIED THAT YOUR FOOD WOULD RUN OUT BEFORE YOU GOT MONEY TO BUY MORE.: NEVER TRUE

## 2021-12-27 SDOH — ECONOMIC STABILITY: FOOD INSECURITY: WITHIN THE PAST 12 MONTHS, THE FOOD YOU BOUGHT JUST DIDN'T LAST AND YOU DIDN'T HAVE MONEY TO GET MORE.: NEVER TRUE

## 2021-12-27 ASSESSMENT — ENCOUNTER SYMPTOMS
SHORTNESS OF BREATH: 0
BACK PAIN: 0
CHEST TIGHTNESS: 0
WHEEZING: 0
ABDOMINAL DISTENTION: 0
COUGH: 0
ABDOMINAL PAIN: 0

## 2021-12-27 ASSESSMENT — SOCIAL DETERMINANTS OF HEALTH (SDOH): HOW HARD IS IT FOR YOU TO PAY FOR THE VERY BASICS LIKE FOOD, HOUSING, MEDICAL CARE, AND HEATING?: NOT HARD AT ALL

## 2021-12-27 NOTE — PROGRESS NOTES
[address] [facility] [address]  Dept: [phone]  Dept Fax: [phone]  Loc: [phone]  PROGRESS NOTE      VisitDate: 12/27/2021    Briana See is a 46 y.o. female who presents today for:     Chief Complaint   Patient presents with    Follow-up     DM follow up, she never started jardiance because she read the side effects. Subjective:  Patient presents for follow-up regarding diabetes. Reports that she never actually started the Jardiance due to concern over side effects reports fasting glucose 160s today. Patient interested in starting Ozempic. History of arthritis, breast cancer stomach ulcers meniscal tears type 2 diabetes, obesity, lymphedema, right femur fracture ORIF. Presents per wheelchair,  accompanied with spouse. Review of Systems   Constitutional: Negative for activity change, appetite change, chills, fatigue and fever. Eyes: Negative for visual disturbance. Respiratory: Negative for cough, chest tightness, shortness of breath and wheezing. Cardiovascular: Negative for chest pain, palpitations and leg swelling. Gastrointestinal: Negative for abdominal distention and abdominal pain. Genitourinary: Negative for dysuria. Musculoskeletal: Positive for gait problem. Negative for back pain and neck pain. Skin: Negative. Negative for rash. Neurological: Negative for dizziness, light-headedness and headaches. Hematological: Negative for adenopathy. Psychiatric/Behavioral: Negative for decreased concentration and dysphoric mood. All other systems reviewed and are negative.     Past Medical History:   Diagnosis Date    Arthritis     Breast cancer ([address]     H/O bladder infections     History of stomach ulcers     Knee cartilage, torn, left     PONV (postoperative nausea and vomiting)     Type II or unspecified type diabetes mellitus without mention of complication, not stated as uncontrolled       Past Surgical History:   Procedure Laterality Date    COLONOSCOPY  2016    DILATION AND CURETTAGE OF UTERUS  2017    ENDOMETRIAL ABLATION      FEMUR FRACTURE SURGERY Right 2021    FEMUR OPEN REDUCTION INTERNAL FIXATION performed by Allison Torres MD at [address]  2011    Dr Shalonda Sevilla, TOTAL ABDOMINAL  12/15/2020    JOINT REPLACEMENT  12    Right total knee      LYMPHADENECTOMY  10/02/2020    MASTECTOMY Left     SALPINGO-OOPHORECTOMY N/A 12/15/2020    ROBOTIC HYSYER WITH BILATERAL SALPING-OOPHORECTOMY performed by Naima Saini MD at [facility]       Family History   Problem Relation Age of Onset    Heart Disease Mother         CHF    Cancer Father     COPD Father     Diabetes Father     High Blood Pressure Father     High Cholesterol Father      Social History     Tobacco Use    Smoking status: Former Smoker     Packs/day: 0.25     Years: 12.00     Pack years: 3.00     Types: Cigarettes     Quit date: 2000     Years since quittin.3    Smokeless tobacco: Never Used   Substance Use Topics    Alcohol use: Yes     Comment: rarely      Current Outpatient Medications   Medication Sig Dispense Refill    oxyCODONE-acetaminophen (PERCOCET) 5-325 MG per tablet take 1 tablet by mouth every 6 hours if needed for pain      Semaglutide, 1 MG/DOSE, 4 MG/3ML SOPN Inject 1 mg into the skin once a week 3 mL 3    ALPRAZolam (XANAX) 0.5 MG tablet take 1 tablet by mouth three times a day if needed for anxiety or sleep 90 tablet 0    loratadine (CLARITIN) 10 MG tablet take 1 tablet by mouth once daily 90 tablet 3    fluticasone (FLONASE) 50 MCG/ACT nasal spray instill 1 spray into each nostril once daily 48 g 3    Blood Glucose Monitoring Suppl (ACCU-CHEK GUIDE) w/Device KIT 1 each by Does not apply route daily 1 kit 0    blood glucose test strips (ACCU-CHEK GUIDE) strip 1 each by In Vitro route daily As needed. 100 each 3    ondansetron (ZOFRAN) 4 MG tablet Take 1 tablet by mouth daily as needed for Nausea or Vomiting 30 tablet 0    promethazine (PHENERGAN) 25 MG tablet Take 1 tablet by mouth every 8 hours as needed for Nausea 60 tablet 0    mometasone (ELOCON) 0.1 % cream Apply topically daily. 45 g 2    diclofenac sodium (VOLTAREN) 1 % GEL Apply 2 g topically 4 times daily 150 g 5    PARoxetine (PAXIL) 40 MG tablet Take 1 tablet by mouth daily 90 tablet 3    metFORMIN (GLUCOPHAGE-XR) 500 MG extended release tablet Take 2 tablets by mouth 2 times daily take 2 tablets by mouth every morning (Patient taking differently: Take 1,000 mg by mouth every evening take 4 tablets by mouth every morning) 360 tablet 3    rOPINIRole (REQUIP) 0.5 MG tablet Take 2 tablets by mouth nightly 180 tablet 3    Glucosamine-Chondroitin (GLUCOSAMINE CHONDR COMPLEX PO) Take by mouth daily       albuterol sulfate HFA (PROAIR HFA) 108 (90 Base) MCG/ACT inhaler Inhale 2 puffs into the lungs every 6 hours as needed for Wheezing 1 Inhaler 3    letrozole (FEMARA) 2.5 MG tablet Take 1 tablet by mouth every other day (Patient not taking: Reported on 12/27/2021) 30 tablet 0     No current facility-administered medications for this visit.      Allergies   Allergen Reactions    Sulfa Antibiotics Itching    Adhesive Tape Rash and Other (See Comments)     Blisters with prolonged use    Hydrocodone-Acetaminophen Nausea And Vomiting     Health Maintenance   Topic Date Due    Hepatitis C screen  Never done    Pneumococcal 0-64 years Vaccine (1 of 2 - PPSV23) Never done    HIV screen  Never done    Hepatitis B vaccine (1 of 3 - Risk 3-dose series) Never done    Shingles Vaccine (1 of 2) Never done    Diabetic microalbuminuria test  07/02/2019    Diabetic foot exam  02/22/2020    Lipid screen  03/26/2020    Diabetic retinal exam  04/23/2022    Breast cancer screen  09/07/2022    A1C test (Diabetic or Prediabetic)  11/06/2022    Colon cancer screen colonoscopy  03/08/2026    DTaP/Tdap/Td vaccine (2 - Td or Tdap) 09/22/2030    Flu vaccine  Completed    COVID-19 Vaccine  Completed    Hepatitis A vaccine  Aged Out    Hib vaccine  Aged Out    Meningococcal (ACWY) vaccine  Aged Out         Objective:     Physical Exam  Vitals and nursing note reviewed. Constitutional:       Appearance: Normal appearance. She is well-developed. She is obese. HENT:      Head: Normocephalic. Right Ear: Tympanic membrane and ear canal normal.      Left Ear: Tympanic membrane and ear canal normal.      Nose: Nose normal.      Mouth/Throat:      Pharynx: Oropharynx is clear. Eyes:      Extraocular Movements: Extraocular movements intact. Conjunctiva/sclera: Conjunctivae normal.   Cardiovascular:      Rate and Rhythm: Normal rate and regular rhythm. Pulses: Normal pulses. Heart sounds: Normal heart sounds. Pulmonary:      Effort: Pulmonary effort is normal.      Breath sounds: Normal breath sounds. Abdominal:      General: Bowel sounds are normal.      Palpations: Abdomen is soft. Musculoskeletal:         General: Normal range of motion. Cervical back: Neck supple. Skin:     General: Skin is warm and dry. Neurological:      General: No focal deficit present. Mental Status: She is alert and oriented to person, place, and time. Psychiatric:         Mood and Affect: Mood normal.         Thought Content: Thought content normal.         Judgment: Judgment normal.       /80   Pulse 120   Temp 98.9 °F (37.2 °C) (Oral)   LMP 11/17/2020 (Approximate) Comment: tubal ligation/ablation  SpO2 96%       Impression/Plan:  1. Type 2 diabetes mellitus without complication, without long-term current use of insulin ([address]    2.  DM type 2, not at goal [facility])    3. Class 3 severe obesity with serious comorbidity and body mass index (BMI) of 40.0 to 44.9 in adult, unspecified obesity type ([facility]    4. Malignant neoplasm of female breast, unspecified estrogen receptor status, unspecified laterality, unspecified site of breast ([facility]    5. Closed displaced fracture of distal epiphysis of right femur, initial encounter ([facility]      Requested Prescriptions     Signed Prescriptions Disp Refills    Semaglutide, 1 MG/DOSE, 4 MG/3ML SOPN 3 mL 3     Sig: Inject 1 mg into the skin once a week     Orders Placed This Encounter   Procedures    Hemoglobin A1C     Standing Status:   Future     Standing Expiration Date:   12/27/2022    Insulin, Fasting     Standing Status:   Future     Standing Expiration Date:   12/27/2022    C-Peptide, Serum     Standing Status:   Future     Standing Expiration Date:   12/27/2022    Comprehensive Metabolic Panel     Standing Status:   Future     Standing Expiration Date:   12/27/2022    Lipid Panel     Standing Status:   Future     Standing Expiration Date:   12/27/2022     Order Specific Question:   Is Patient Fasting?/# of Hours     Answer:   yes/12       Patient giveneducational materials - see patient instructions. Discussed use, benefit, and side effects of prescribed medications. All patient questions answered. Pt voiced understanding. Reviewed health maintenance. Patient agreedwith treatment plan. Follow up as directed. Routine labs in about 6 weeks. A1c, insulin level C-peptide CMP FLP. Instructed to increase Ozempic gradually every 4 weeks initially start 0.25 mg weekly x4 then 0.5 mg weekly x4 then 0.75 mg weekly x4 then ultimately 1 mg dosage weekly. Continue medications as prescribed.  Educational information on above diagnosis  provided per AVS.  Low-carb diet  Electronically signed by JERI Pearson CNP on 12/27/2021 at 2:22 PM

## 2022-01-07 DIAGNOSIS — G25.81 RESTLESS LEG SYNDROME: ICD-10-CM

## 2022-01-07 RX ORDER — ROPINIROLE 0.5 MG/1
TABLET, FILM COATED ORAL
Qty: 180 TABLET | Refills: 3 | Status: SHIPPED | OUTPATIENT
Start: 2022-01-07 | End: 2022-10-21 | Stop reason: SDUPTHER

## 2022-01-12 ENCOUNTER — PATIENT MESSAGE (OUTPATIENT)
Dept: FAMILY MEDICINE CLINIC | Age: 52
End: 2022-01-12

## 2022-01-12 DIAGNOSIS — Z96.659 PERIPROSTHETIC SUPRACONDYLAR FRACTURE OF FEMUR, INITIAL ENCOUNTER: ICD-10-CM

## 2022-01-12 DIAGNOSIS — M97.8XXA PERIPROSTHETIC SUPRACONDYLAR FRACTURE OF FEMUR, INITIAL ENCOUNTER: ICD-10-CM

## 2022-01-12 NOTE — TELEPHONE ENCOUNTER
From: Dl Tse  To: Dr. Charmayne Hanly: 1/12/2022 2:15 PM EST  Subject: Refill     Good afternoon, I was wondering if I could get a refill on my Percocet My back and hips have not done well with my leg recovery and the cold weather.   Thanks,    Adalid Villeda

## 2022-01-13 RX ORDER — OXYCODONE HYDROCHLORIDE AND ACETAMINOPHEN 5; 325 MG/1; MG/1
1-2 TABLET ORAL EVERY 4 HOURS PRN
Qty: 50 TABLET | Refills: 0 | Status: SHIPPED | OUTPATIENT
Start: 2022-01-13 | End: 2022-03-08 | Stop reason: SDUPTHER

## 2022-01-24 RX ORDER — CYCLOBENZAPRINE HCL 10 MG
10 TABLET ORAL 3 TIMES DAILY PRN
Qty: 40 TABLET | Refills: 0 | Status: SHIPPED | OUTPATIENT
Start: 2022-01-24 | End: 2022-02-18

## 2022-01-31 RX ORDER — PAROXETINE HYDROCHLORIDE 40 MG/1
TABLET, FILM COATED ORAL
Qty: 90 TABLET | Refills: 3 | Status: SHIPPED | OUTPATIENT
Start: 2022-01-31

## 2022-02-04 RX ORDER — METFORMIN HYDROCHLORIDE 500 MG/1
2000 TABLET, EXTENDED RELEASE ORAL DAILY
Qty: 360 TABLET | Refills: 3 | Status: SHIPPED | OUTPATIENT
Start: 2022-02-04 | End: 2022-10-21

## 2022-02-14 ENCOUNTER — TELEPHONE (OUTPATIENT)
Dept: FAMILY MEDICINE CLINIC | Age: 52
End: 2022-02-14

## 2022-02-14 NOTE — TELEPHONE ENCOUNTER
Patient called in and stated that last week she was suppose to increase her ozempic to 1 mg and was really sick from it. Patient would like a prescription for the 0.5 mg to be sent to her pharmacy. . [facility] [address]. She will be coming into the office today for a sample box so she does not miss her dose today.

## 2022-02-17 ENCOUNTER — PATIENT MESSAGE (OUTPATIENT)
Dept: FAMILY MEDICINE CLINIC | Age: 52
End: 2022-02-17

## 2022-02-17 DIAGNOSIS — G47.00 INSOMNIA, UNSPECIFIED TYPE: ICD-10-CM

## 2022-02-18 RX ORDER — ALPRAZOLAM 0.5 MG/1
TABLET ORAL
Qty: 90 TABLET | Refills: 0 | Status: SHIPPED | OUTPATIENT
Start: 2022-02-18 | End: 2022-04-28

## 2022-02-18 RX ORDER — CYCLOBENZAPRINE HCL 10 MG
TABLET ORAL
Qty: 40 TABLET | Refills: 0 | Status: SHIPPED | OUTPATIENT
Start: 2022-02-18 | End: 2022-04-12 | Stop reason: SDUPTHER

## 2022-02-18 NOTE — TELEPHONE ENCOUNTER
From: Eleni Farrell  To: Dakota Gao  Sent: 2/17/2022 10:39 PM EST  Subject: Ozempic .5mg    Hi, you called in the wrong dose of ozempic, I need . 5mg Im not up to 1mg yet.    Thanks,   Damaso Shanks

## 2022-03-08 ENCOUNTER — PATIENT MESSAGE (OUTPATIENT)
Dept: FAMILY MEDICINE CLINIC | Age: 52
End: 2022-03-08

## 2022-03-08 DIAGNOSIS — Z96.659 PERIPROSTHETIC SUPRACONDYLAR FRACTURE OF FEMUR, INITIAL ENCOUNTER: ICD-10-CM

## 2022-03-08 DIAGNOSIS — M97.8XXA PERIPROSTHETIC SUPRACONDYLAR FRACTURE OF FEMUR, INITIAL ENCOUNTER: ICD-10-CM

## 2022-03-08 RX ORDER — OXYCODONE HYDROCHLORIDE AND ACETAMINOPHEN 5; 325 MG/1; MG/1
1-2 TABLET ORAL EVERY 4 HOURS PRN
Qty: 50 TABLET | Refills: 0 | Status: SHIPPED | OUTPATIENT
Start: 2022-03-08 | End: 2022-05-09 | Stop reason: SDUPTHER

## 2022-03-08 NOTE — TELEPHONE ENCOUNTER
From: Jerzy Ivey  To: Dr. Petra Salgado: 3/8/2022 11:08 AM EST  Subject: Refill    Hi, would it be possible to get another refill on my Percocet?   Thank you,   Juliette

## 2022-04-12 ENCOUNTER — OFFICE VISIT (OUTPATIENT)
Dept: FAMILY MEDICINE CLINIC | Age: 52
End: 2022-04-12
Payer: COMMERCIAL

## 2022-04-12 VITALS
WEIGHT: 210 LBS | BODY MASS INDEX: 37.2 KG/M2 | HEART RATE: 133 BPM | OXYGEN SATURATION: 97 % | TEMPERATURE: 98 F | RESPIRATION RATE: 18 BRPM | DIASTOLIC BLOOD PRESSURE: 80 MMHG | SYSTOLIC BLOOD PRESSURE: 124 MMHG

## 2022-04-12 DIAGNOSIS — E11.9 TYPE 2 DIABETES MELLITUS WITHOUT COMPLICATION, WITHOUT LONG-TERM CURRENT USE OF INSULIN (HCC): ICD-10-CM

## 2022-04-12 DIAGNOSIS — C50.919 MALIGNANT NEOPLASM OF FEMALE BREAST, UNSPECIFIED ESTROGEN RECEPTOR STATUS, UNSPECIFIED LATERALITY, UNSPECIFIED SITE OF BREAST (HCC): ICD-10-CM

## 2022-04-12 DIAGNOSIS — G89.29 CHRONIC PAIN OF RIGHT KNEE: ICD-10-CM

## 2022-04-12 DIAGNOSIS — E66.01 CLASS 3 SEVERE OBESITY WITH SERIOUS COMORBIDITY AND BODY MASS INDEX (BMI) OF 40.0 TO 44.9 IN ADULT, UNSPECIFIED OBESITY TYPE (HCC): ICD-10-CM

## 2022-04-12 DIAGNOSIS — R61 SWEATING INCREASE: ICD-10-CM

## 2022-04-12 DIAGNOSIS — J30.2 SEASONAL ALLERGIES: ICD-10-CM

## 2022-04-12 DIAGNOSIS — M25.561 CHRONIC PAIN OF RIGHT KNEE: ICD-10-CM

## 2022-04-12 DIAGNOSIS — C50.912 INFILTRATING DUCTAL CARCINOMA OF LEFT BREAST (HCC): ICD-10-CM

## 2022-04-12 DIAGNOSIS — J30.2 SEASONAL ALLERGIC RHINITIS, UNSPECIFIED TRIGGER: ICD-10-CM

## 2022-04-12 DIAGNOSIS — R09.89 RUNNY NOSE: Primary | ICD-10-CM

## 2022-04-12 DIAGNOSIS — S72.441A CLOSED DISPLACED FRACTURE OF DISTAL EPIPHYSIS OF RIGHT FEMUR, INITIAL ENCOUNTER (HCC): ICD-10-CM

## 2022-04-12 DIAGNOSIS — G25.81 RESTLESS LEG SYNDROME: ICD-10-CM

## 2022-04-12 LAB
INFLUENZA VIRUS A RNA: NORMAL
INFLUENZA VIRUS B RNA: NORMAL

## 2022-04-12 PROCEDURE — 99214 OFFICE O/P EST MOD 30 MIN: CPT | Performed by: NURSE PRACTITIONER

## 2022-04-12 PROCEDURE — 96372 THER/PROPH/DIAG INJ SC/IM: CPT | Performed by: NURSE PRACTITIONER

## 2022-04-12 PROCEDURE — 87502 INFLUENZA DNA AMP PROBE: CPT | Performed by: NURSE PRACTITIONER

## 2022-04-12 RX ORDER — CYCLOBENZAPRINE HCL 10 MG
TABLET ORAL
Qty: 40 TABLET | Refills: 0 | Status: SHIPPED | OUTPATIENT
Start: 2022-04-12 | End: 2022-06-15

## 2022-04-12 RX ORDER — METHYLPREDNISOLONE ACETATE 40 MG/ML
80 INJECTION, SUSPENSION INTRA-ARTICULAR; INTRALESIONAL; INTRAMUSCULAR; SOFT TISSUE ONCE
Status: COMPLETED | OUTPATIENT
Start: 2022-04-12 | End: 2022-04-12

## 2022-04-12 RX ADMIN — METHYLPREDNISOLONE ACETATE 80 MG: 40 INJECTION, SUSPENSION INTRA-ARTICULAR; INTRALESIONAL; INTRAMUSCULAR; SOFT TISSUE at 12:31

## 2022-04-12 ASSESSMENT — PATIENT HEALTH QUESTIONNAIRE - PHQ9
SUM OF ALL RESPONSES TO PHQ QUESTIONS 1-9: 0
1. LITTLE INTEREST OR PLEASURE IN DOING THINGS: 0
SUM OF ALL RESPONSES TO PHQ QUESTIONS 1-9: 0
2. FEELING DOWN, DEPRESSED OR HOPELESS: 0
SUM OF ALL RESPONSES TO PHQ9 QUESTIONS 1 & 2: 0
SUM OF ALL RESPONSES TO PHQ QUESTIONS 1-9: 0
SUM OF ALL RESPONSES TO PHQ QUESTIONS 1-9: 0

## 2022-04-12 NOTE — PROGRESS NOTES
Administrations This Visit     methylPREDNISolone acetate (DEPO-MEDROL) injection 80 mg     Admin Date  04/12/2022  12:31 Action  Given Dose  80 mg Route  IntraMUSCular Site  Dorsogluteal Right Administered By  Patric Jennings    Ordering Provider: JERI Wang CNP    NDC: [address]-7    Lot#: [id number]    : [address]    Patient Supplied?: No

## 2022-04-14 ASSESSMENT — ENCOUNTER SYMPTOMS
WHEEZING: 0
BACK PAIN: 0
ABDOMINAL PAIN: 0
COUGH: 0
ABDOMINAL DISTENTION: 0
SHORTNESS OF BREATH: 0
CHEST TIGHTNESS: 0

## 2022-04-14 NOTE — PROGRESS NOTES
[address] [place] [place] [facility] [address]  Dept: [phone]  Dept Fax: [phone]  Loc: [phone]  PROGRESS NOTE      VisitDate: 4/12/2022    Jah Stacy is a 46 y.o. female who presents today for:     Chief Complaint   Patient presents with    Hip Pain     right hip pain down into leg, discuss requip-still having RLS    Nasal Congestion     runny nose, ST, otalgia, sweating,          Subjective:  Patient presents for routine checkup. Discussed medications. Reviewed recent labs. Patient complains of postnasal drainage runny nose sore throat bilateral otalgia excessive sweating over the past couple weeks. Patient also complains of continued right thigh pain right lateral knee pain since ORIF femur fracture 11/21. History of arthritis, breast cancer, gastric ulcers type 2 diabetes. Take medications as prescribed. Reports that side effects of Ozempic have resolved. No fever chest pain shortness of breath abdominal pain nausea vomiting diarrhea. Patient reports significant weight loss while taking Ozempic. Ambulating with walker assist.        Review of Systems   Constitutional: Positive for diaphoresis. Negative for activity change, appetite change, chills, fatigue and fever. Eyes: Negative for visual disturbance. Respiratory: Negative for cough, chest tightness, shortness of breath and wheezing. Cardiovascular: Negative for chest pain, palpitations and leg swelling. Gastrointestinal: Negative for abdominal distention and abdominal pain. Genitourinary: Negative for dysuria. Musculoskeletal: Positive for arthralgias and gait problem. Negative for back pain and neck pain. Skin: Negative. Negative for rash. Neurological: Negative for dizziness, light-headedness and headaches. Hematological: Negative for adenopathy. Psychiatric/Behavioral: Positive for decreased concentration and dysphoric mood.  The patient is nervous/anxious. All other systems reviewed and are negative.     Past Medical History:   Diagnosis Date    Arthritis     Breast cancer ([facility]     H/O bladder infections     History of stomach ulcers     Knee cartilage, torn, left     PONV (postoperative nausea and vomiting)     Type II or unspecified type diabetes mellitus without mention of complication, not stated as uncontrolled       Past Surgical History:   Procedure Laterality Date    COLONOSCOPY      DILATION AND CURETTAGE OF UTERUS  2017    ENDOMETRIAL ABLATION      FEMUR FRACTURE SURGERY Right 2021    FEMUR OPEN REDUCTION INTERNAL FIXATION performed by Estrella Knox MD at [address]  2011    Dr Elena Patel, TOTAL ABDOMINAL  12/15/2020    JOINT REPLACEMENT  12    Right total knee      LYMPHADENECTOMY  10/02/2020    MASTECTOMY Left     SALPINGO-OOPHORECTOMY N/A 12/15/2020    ROBOTIC HYSYER WITH BILATERAL SALPING-OOPHORECTOMY performed by Abel Husain MD at [facility]       Family History   Problem Relation Age of Onset    Heart Disease Mother         CHF    Cancer Father     COPD Father     Diabetes Father     High Blood Pressure Father     High Cholesterol Father      Social History     Tobacco Use    Smoking status: Former Smoker     Packs/day: 0.25     Years: 12.00     Pack years: 3.00     Types: Cigarettes     Quit date: 2000     Years since quittin.6    Smokeless tobacco: Never Used   Substance Use Topics    Alcohol use: Yes     Comment: rarely      Current Outpatient Medications   Medication Sig Dispense Refill    cyclobenzaprine (FLEXERIL) 10 MG tablet take 1 tablet by mouth three times a day if needed for muscle spasm 40 tablet 0    metFORMIN (GLUCOPHAGE-XR) 500 MG extended release tablet Take 4 tablets by mouth daily 360 tablet 3    PARoxetine (PAXIL) 40 MG tablet take 1 tablet by mouth once daily 90 tablet 3    rOPINIRole (REQUIP) 0.5 MG tablet take 2 tablets by mouth at bedtime 180 tablet 3    oxyCODONE-acetaminophen (PERCOCET) 5-325 MG per tablet take 1 tablet by mouth every 6 hours if needed for pain      Semaglutide, 1 MG/DOSE, 4 MG/3ML SOPN Inject 1 mg into the skin once a week 3 mL 3    loratadine (CLARITIN) 10 MG tablet take 1 tablet by mouth once daily 90 tablet 3    fluticasone (FLONASE) 50 MCG/ACT nasal spray instill 1 spray into each nostril once daily 48 g 3    Blood Glucose Monitoring Suppl (ACCU-CHEK GUIDE) w/Device KIT 1 each by Does not apply route daily 1 kit 0    blood glucose test strips (ACCU-CHEK GUIDE) strip 1 each by In Vitro route daily As needed. 100 each 3    ondansetron (ZOFRAN) 4 MG tablet Take 1 tablet by mouth daily as needed for Nausea or Vomiting 30 tablet 0    letrozole (FEMARA) 2.5 MG tablet Take 1 tablet by mouth every other day 30 tablet 0    promethazine (PHENERGAN) 25 MG tablet Take 1 tablet by mouth every 8 hours as needed for Nausea 60 tablet 0    mometasone (ELOCON) 0.1 % cream Apply topically daily. 45 g 2    diclofenac sodium (VOLTAREN) 1 % GEL Apply 2 g topically 4 times daily 150 g 5    Glucosamine-Chondroitin (GLUCOSAMINE CHONDR COMPLEX PO) Take by mouth daily       albuterol sulfate HFA (PROAIR HFA) 108 (90 Base) MCG/ACT inhaler Inhale 2 puffs into the lungs every 6 hours as needed for Wheezing 1 Inhaler 3     No current facility-administered medications for this visit.      Allergies   Allergen Reactions    Sulfa Antibiotics Itching    Adhesive Tape Rash and Other (See Comments)     Blisters with prolonged use    Hydrocodone-Acetaminophen Nausea And Vomiting     Health Maintenance   Topic Date Due    Hepatitis C screen  Never done    Pneumococcal 0-64 years Vaccine (1 - PCV) Never done    HIV screen  Never done    Hepatitis B vaccine (1 of 3 - Risk 3-dose series) Never done    Shingles Vaccine (1 of 2) Never done    Diabetic microalbuminuria test  07/02/2019    Diabetic foot exam  02/22/2020    Lipid screen  03/26/2020    COVID-19 Vaccine (2 - Pfizer 3-dose series) 01/09/2022    Diabetic retinal exam  04/23/2022    Breast cancer screen  09/07/2022    A1C test (Diabetic or Prediabetic)  11/06/2022    Depression Screen  04/12/2023    Colorectal Cancer Screen  03/08/2026    DTaP/Tdap/Td vaccine (2 - Td or Tdap) 09/22/2030    Flu vaccine  Completed    Hepatitis A vaccine  Aged Out    Hib vaccine  Aged Out    Meningococcal (ACWY) vaccine  Aged Out     Negative influenza    Objective:     Physical Exam  Vitals and nursing note reviewed. Constitutional:       Appearance: Normal appearance. She is well-developed and normal weight. She is diaphoretic. HENT:      Head: Normocephalic. Right Ear: Tympanic membrane, ear canal and external ear normal.      Left Ear: Tympanic membrane, ear canal and external ear normal.      Nose: Mucosal edema and rhinorrhea present. Mouth/Throat:      Pharynx: Oropharynx is clear. Eyes:      Extraocular Movements: Extraocular movements intact. Conjunctiva/sclera: Conjunctivae normal.   Cardiovascular:      Rate and Rhythm: Normal rate and regular rhythm. Pulses: Normal pulses. Heart sounds: Normal heart sounds. Pulmonary:      Effort: Pulmonary effort is normal.      Breath sounds: Normal breath sounds. Abdominal:      General: Bowel sounds are normal.      Palpations: Abdomen is soft. Musculoskeletal:         General: Normal range of motion. Cervical back: Neck supple. Right knee: Swelling and crepitus present. No deformity, effusion or erythema. Tenderness present over the lateral joint line. Legs:    Skin:     General: Skin is warm. Neurological:      General: No focal deficit present. Mental Status: She is alert and oriented to person, place, and time. Psychiatric:         Mood and Affect: Mood normal.         Thought Content:  Thought content normal.         Judgment: Judgment normal.       Component      Latest Ref Rng & Units 11/7/2021 11/7/2021 11/6/2021           5:34 AM  5:34 AM  2:26 PM   Sodium      135 - 145 meq/L  139    Potassium      3.5 - 5.2 meq/L  4.6    Chloride      98 - 111 meq/L  103    CO2      23 - 33 meq/L  24    GLUCOSE, FASTING,GF      70 - 108 mg/dL  182 (H)    BUN,BUNPL      7 - 22 mg/dL  8    Creatinine      0.4 - 1.2 mg/dL  0.5    CALCIUM, SERUM, 232001      8.5 - 10.5 mg/dL  9.0    Hemoglobin A1C      4.4 - 6.4 %   7.5 (H)   AVERAGE GLUCOSE      70 - 126 mg/dL   165 (H)   Hemoglobin Quant      12.0 - 16.0 gm/dl 15.0     Hematocrit      37.0 - 47.0 % 45.6       /80 (Site: Right Upper Arm)   Pulse 133   Temp 98 °F (36.7 °C) (Oral)   Resp 18   Wt 210 lb (95.3 kg)   LMP 11/17/2020 (Approximate) Comment: tubal ligation/ablation  SpO2 97%   BMI 37.20 kg/m²       Impression/Plan:  1. Runny nose    2. Type 2 diabetes mellitus without complication, without long-term current use of insulin ([facility])    3. Class 3 severe obesity with serious comorbidity and body mass index (BMI) of 40.0 to 44.9 in adult, unspecified obesity type ([address]    4. Restless leg syndrome    5. Malignant neoplasm of female breast, unspecified estrogen receptor status, unspecified laterality, unspecified site of breast ([address]    6. Closed displaced fracture of distal epiphysis of right femur, initial encounter ([address]    7. Chronic pain of right knee    8. Seasonal allergies    9. Seasonal allergic rhinitis, unspecified trigger    10. Infiltrating ductal carcinoma of left breast ([address]    11.  Sweating increase      Requested Prescriptions     Signed Prescriptions Disp Refills    cyclobenzaprine (FLEXERIL) 10 MG tablet 40 tablet 0     Sig: take 1 tablet by mouth three times a day if needed for muscle spasm     Orders Placed This Encounter   Procedures    TSH     Standing Status:   Future     Standing Expiration Date:   4/12/2023    T4, Free Standing Status:   Future     Standing Expiration Date:   4/12/2023    POCT Influenza A/B DNA ([facility])       Patient giveneducational materials - see patient instructions. Discussed use, benefit, and side effects of prescribed medications. All patient questions answered. Pt voiced understanding. Reviewed health maintenance. Patient agreedwith treatment plan. Follow up as directed. TSH free T4 ordered. Previously ordered routine labs this week. Flexeril refilled. Continue current meds. Continue medications as prescribed.  Educational information on above diagnosis  provided per AVS.       30 min  Electronically signed by JERI Crum CNP on 4/14/2022 at 1:12 PM

## 2022-04-18 ENCOUNTER — OFFICE VISIT (OUTPATIENT)
Dept: ONCOLOGY | Age: 52
End: 2022-04-18
Payer: COMMERCIAL

## 2022-04-18 ENCOUNTER — HOSPITAL ENCOUNTER (OUTPATIENT)
Dept: INFUSION THERAPY | Age: 52
Discharge: HOME | End: 2022-04-18
Payer: COMMERCIAL

## 2022-04-18 VITALS
WEIGHT: 211 LBS | TEMPERATURE: 98.5 F | OXYGEN SATURATION: 95 % | HEART RATE: 114 BPM | SYSTOLIC BLOOD PRESSURE: 122 MMHG | HEIGHT: 63 IN | BODY MASS INDEX: 37.39 KG/M2 | RESPIRATION RATE: 18 BRPM | DIASTOLIC BLOOD PRESSURE: 80 MMHG

## 2022-04-18 VITALS
RESPIRATION RATE: 18 BRPM | TEMPERATURE: 98.5 F | HEART RATE: 114 BPM | SYSTOLIC BLOOD PRESSURE: 122 MMHG | DIASTOLIC BLOOD PRESSURE: 80 MMHG

## 2022-04-18 DIAGNOSIS — Z17.0 MALIGNANT NEOPLASM OF OVERLAPPING SITES OF LEFT BREAST IN FEMALE, ESTROGEN RECEPTOR POSITIVE (HCC): Primary | ICD-10-CM

## 2022-04-18 DIAGNOSIS — C50.812 MALIGNANT NEOPLASM OF OVERLAPPING SITES OF LEFT BREAST IN FEMALE, ESTROGEN RECEPTOR POSITIVE (HCC): Primary | ICD-10-CM

## 2022-04-18 DIAGNOSIS — R00.0 TACHYCARDIA: ICD-10-CM

## 2022-04-18 DIAGNOSIS — Z51.81 ENCOUNTER FOR MONITORING AROMATASE INHIBITOR THERAPY: ICD-10-CM

## 2022-04-18 DIAGNOSIS — Z79.811 ENCOUNTER FOR MONITORING AROMATASE INHIBITOR THERAPY: ICD-10-CM

## 2022-04-18 PROCEDURE — 99211 OFF/OP EST MAY X REQ PHY/QHP: CPT

## 2022-04-18 PROCEDURE — 99214 OFFICE O/P EST MOD 30 MIN: CPT | Performed by: PHYSICIAN ASSISTANT

## 2022-04-18 RX ORDER — LETROZOLE 2.5 MG/1
2.5 TABLET, FILM COATED ORAL EVERY OTHER DAY
Qty: 30 TABLET | Refills: 1 | Status: SHIPPED | OUTPATIENT
Start: 2022-04-18 | End: 2022-07-20

## 2022-04-18 NOTE — PATIENT INSTRUCTIONS
1. Return to clinic with Dr. Lay Toussaint in 3 months (transfer from Dr. Aroldo Molina)  2. Patient will begin taking Femara every other day at night. 3. Will obtain CBC. Patient instructed to obtain TSH as per PCP. 4. Instructed to follow up with Gynecology and GI.   5 Please call for questions or concerns.

## 2022-04-18 NOTE — PROGRESS NOTES
Designation of other margins of main specimen </=2 mm: NA     Re-resection margin status (P=positive, N=negative, NA=not applicable): NA    Regional lymph nodes: Total number of lymph nodes (sentinel and non-sentinel): 4     Number of sentinel lymph nodes: 4     Number of lymph nodes with macrometastases: 0     Number of lymph nodes with micrometastases: 0     Number of lymph nodes with isolated tumor cells: 0    Estrogen receptor: positive (100%, strong intensity)  Progesterone receptor: positive (60%, strong intensity)  HER2 FISH: negative (ratio 0.8, copy number 1.5) per outside report  pTNM: pT1a pN0(sn)(i-)     She met with Dr. Magy Lopez at [facility] to discuss adjuvant treatment options. The patient received recommendation consider antiestrogen therapy. Her postsurgical course was complicated by healing of her reconstruction will ultimately required placement of wound VAC and skin graft. In  June 2020 placed on aromatase inhibitor. She also developed lymphedema  In December 2020 the patient underwent  hysterectomy with bilateral oophorectomy. Till this point the patient was under care of Dr. Connie Menjivar and was transferred to Dr. Sharri Vance in April 2021. Interval History 4/18/2022:   The patient is here for follow up evaluation. The patient had been taking Arimidex every other day. In September 2021 she reported increased arthralgia, depression. The patient was instructed to hold Arimidex for two weeks. She was placed on Femara in November 2021 - instructed to take every other day at night and was to follow up in December 2021. However, the patient fell in December and broke her femur. She states she has not been on an aromatase inhibitor since September 2021. She did not begin Femara as she was concerned about side effects and recent femur fracture. The patient was in a wheelchair until February 2022. She denies any concern related to her right breast or left mastectomy site.  Denies nipple inversion, nipple discharge or palpable masses. She reports while off of AI she has lost weight and concerned about potential weight gain. She affirms chronic anxiety and is on Paxil per her PCP. She reports mood changes with Arimidex. The patient was noted to have recent tachycardia at her PCP's office on 4/12/22 with . Today in office 114 bpm. PCP ordered TSH for evaluation. PMH, SH, and FH:  I reviewed the patients medication list and allergy list as noted on the electronic medical record. The PMH, SH and FH were also reviewed as noted on the EMR. Review of Systems:   Review of Systems   Pertinent review of systems noted in HPI, all other ROS negative. Objective:   Physical Exam   /80 (Site: Right Upper Arm, Position: Sitting, Cuff Size: Medium Adult)   Pulse 114   Temp 98.5 °F (36.9 °C) (Oral)   Resp 18   Ht 5' 3\" (1.6 m)   Wt 211 lb (95.7 kg)   LMP 11/17/2020 (Approximate) Comment: tubal ligation/ablation  SpO2 95%   BMI 37.38 kg/m²    General appearance: No apparent distress, well developed, and cooperative. HEENT: Pupils equal, round, and reactive to light. Conjunctivae/corneas clear. Neck: Supple, with full range of motion. Trachea midline. Respiratory:  Normal respiratory effort. Clear to auscultation bilaterally. No wheezes, rales or rhonchi. Cardiovascular:  Borderline tachycardic, regular rhythm. Abdomen: Soft, active bowel sounds. Musculoskeletal: ambulates in office. Skin: Skin color, texture, turgor normal.  No visible rashes or lesions. Neurologic:  Neurovascularly intact without any focal sensory/motor deficits.    Psychiatric: Alert and oriented       Imaging Studies and Labs:   CBC:   Lab Results   Component Value Date    WBC 11.7 (H) 11/06/2021    HGB 15.0 11/07/2021    HCT 45.6 11/07/2021    MCV 91.8 11/06/2021     (H) 11/06/2021     BMP:   Lab Results   Component Value Date     11/07/2021    K 4.6 11/07/2021    K 4.0 11/06/2021  11/07/2021    CO2 24 11/07/2021    BUN 8 11/07/2021    CREATININE 0.5 11/07/2021    GLUCOSE 182 11/07/2021    GLUCOSE 121 03/26/2019    CALCIUM 9.0 11/07/2021      LFT:   Lab Results   Component Value Date    ALT 82 (H) 11/06/2021    AST 51 (H) 11/06/2021    ALKPHOS 80 11/06/2021    BILITOT 0.4 11/06/2021         Assessment and Plan:   1. History of Left Breast Cancer  Stage Ia. S/P left mastectomy in October 2019. She was started on adjuvant aromatase inhibitor. 2. Adjuvant Aromatase Inhibitor   The patient has not tolerated Arimidex well. She discontinued in September 2021 due to arthralgias, mood changes. She was instructed to stop Arimidex for 2 weeks and then call office. She was given prescription for Femara in November 2021 which patient filled but has not started. In December 2021 the patient fell down bleachers at a basketball game and broke her femur. She required use of wheelchair until February 2022. Reviewed reasoning for adjuvant AI with the patient. Reviewed potential side effects of Femara.    -Instructed to begin Femara every other day. Instructed to take at night. She will continue to take glucosamine and chondroitin. Instructed to trial evening primrose oil   -DEXA scan on 4/16/2021 showed osteopenia. She will be due for DEXA in April 2023    -She has follow up with Surgical Oncology, Dr. Juan Luis Solano, at [facility] on 6/10/2022   3. Tachycardia  On exam manual  bpm, regular rhythm. Chronic per chart review. TSH ordered per PCP. Will obtain CBC to rule out anemia contributing. The patient reports while wiping small amount of blood on toilet paper. She has an appt with GI and instructed to call/keep appt. Instructed to follow up with Gynecology as patient is due to examination. Return to clinic in 3 months with Dr. Jeri Thurston. All patient questions answered. Pt voiced understanding. Patient agreed with treatment plan. Follow up as directed.  Patient instructed to call for questions or concerns.          Electronically signed by   Yasmeen Santizo PA-C

## 2022-04-19 ENCOUNTER — TELEPHONE (OUTPATIENT)
Dept: FAMILY MEDICINE CLINIC | Age: 52
End: 2022-04-19

## 2022-04-19 RX ORDER — FEXOFENADINE HCL AND PSEUDOEPHEDRINE HCI 180; 240 MG/1; MG/1
1 TABLET, EXTENDED RELEASE ORAL DAILY
Qty: 30 TABLET | Refills: 1 | Status: SHIPPED | OUTPATIENT
Start: 2022-04-19 | End: 2022-07-20

## 2022-04-19 NOTE — TELEPHONE ENCOUNTER
Pt calls in c/o ongoing sneezing, nasal congestion, and PND cough. Seen in office 4/12/22, she was neg for flu a/b and tx with depo-medrol 80mg. Reports that she got no relief from injection. She is hoping to have a decongestant called in. Uses [facility].      CPB

## 2022-04-27 DIAGNOSIS — G47.00 INSOMNIA, UNSPECIFIED TYPE: ICD-10-CM

## 2022-04-28 RX ORDER — ALPRAZOLAM 0.5 MG/1
TABLET ORAL
Qty: 90 TABLET | Refills: 0 | Status: SHIPPED | OUTPATIENT
Start: 2022-04-28 | End: 2022-07-20 | Stop reason: SDUPTHER

## 2022-05-05 ENCOUNTER — TELEPHONE (OUTPATIENT)
Dept: ONCOLOGY | Age: 52
End: 2022-05-05

## 2022-05-05 NOTE — TELEPHONE ENCOUNTER
Pt saw you 4/18, she has been taking Femara every other day. Calling the office stating she is having quite a bit of bone pain especially in the fractured femur she has. Should she stop taking?

## 2022-05-06 NOTE — TELEPHONE ENCOUNTER
Is she taking the Femara at night? With glucosamine and chondroitin, along with evening primrose oil?

## 2022-05-09 DIAGNOSIS — Z96.659 PERIPROSTHETIC SUPRACONDYLAR FRACTURE OF FEMUR, INITIAL ENCOUNTER: ICD-10-CM

## 2022-05-09 DIAGNOSIS — M97.8XXA PERIPROSTHETIC SUPRACONDYLAR FRACTURE OF FEMUR, INITIAL ENCOUNTER: ICD-10-CM

## 2022-05-09 RX ORDER — ONDANSETRON 4 MG/1
4 TABLET, FILM COATED ORAL DAILY PRN
Qty: 30 TABLET | Refills: 0 | Status: SHIPPED | OUTPATIENT
Start: 2022-05-09 | End: 2022-06-06

## 2022-05-09 RX ORDER — OXYCODONE HYDROCHLORIDE AND ACETAMINOPHEN 5; 325 MG/1; MG/1
1-2 TABLET ORAL EVERY 4 HOURS PRN
Qty: 50 TABLET | Refills: 0 | Status: SHIPPED | OUTPATIENT
Start: 2022-05-09 | End: 2022-07-20 | Stop reason: SDUPTHER

## 2022-05-10 NOTE — TELEPHONE ENCOUNTER
Pt returned call, she is taking Femara at night along with supplements. Pt is currently in therapy for her femur fracture and because of the pain she stopped femara completely until she is done with therapy. This should be completed in 1-2 months, then she will go back on the femara. F/u scheduled with Dr. Nichole Pryor in 3 months.

## 2022-06-06 RX ORDER — ONDANSETRON 4 MG/1
TABLET, FILM COATED ORAL
Qty: 30 TABLET | Refills: 0 | Status: SHIPPED | OUTPATIENT
Start: 2022-06-06 | End: 2022-07-05

## 2022-06-15 RX ORDER — CYCLOBENZAPRINE HCL 10 MG
TABLET ORAL
Qty: 40 TABLET | Refills: 0 | Status: SHIPPED | OUTPATIENT
Start: 2022-06-15 | End: 2022-08-11 | Stop reason: SDUPTHER

## 2022-07-05 RX ORDER — ONDANSETRON 4 MG/1
TABLET, FILM COATED ORAL
Qty: 30 TABLET | Refills: 0 | Status: SHIPPED | OUTPATIENT
Start: 2022-07-05

## 2022-07-13 ENCOUNTER — TELEPHONE (OUTPATIENT)
Dept: ONCOLOGY | Age: 52
End: 2022-07-13

## 2022-07-13 ENCOUNTER — HOSPITAL ENCOUNTER (OUTPATIENT)
Dept: INFUSION THERAPY | Age: 52
Discharge: HOME | End: 2022-07-13

## 2022-07-13 NOTE — TELEPHONE ENCOUNTER
Pt called in regarding her appt on 7/13. Pt did want to cancel. I attempted to call her back.  A vm was left

## 2022-07-14 RX ORDER — OXYCODONE HYDROCHLORIDE AND ACETAMINOPHEN 5; 325 MG/1; MG/1
TABLET ORAL
Qty: 50 TABLET | OUTPATIENT
Start: 2022-07-14

## 2022-07-14 RX ORDER — SEMAGLUTIDE 1.34 MG/ML
INJECTION, SOLUTION SUBCUTANEOUS
Qty: 3 ML | Refills: 3 | Status: SHIPPED | OUTPATIENT
Start: 2022-07-14 | End: 2022-07-20

## 2022-07-20 ENCOUNTER — OFFICE VISIT (OUTPATIENT)
Dept: FAMILY MEDICINE CLINIC | Age: 52
End: 2022-07-20
Payer: COMMERCIAL

## 2022-07-20 VITALS
HEART RATE: 96 BPM | BODY MASS INDEX: 36.68 KG/M2 | WEIGHT: 207 LBS | SYSTOLIC BLOOD PRESSURE: 118 MMHG | RESPIRATION RATE: 16 BRPM | HEIGHT: 63 IN | DIASTOLIC BLOOD PRESSURE: 88 MMHG | TEMPERATURE: 98.1 F

## 2022-07-20 DIAGNOSIS — J30.2 SEASONAL ALLERGIC RHINITIS, UNSPECIFIED TRIGGER: ICD-10-CM

## 2022-07-20 DIAGNOSIS — Z96.659 PERIPROSTHETIC SUPRACONDYLAR FRACTURE OF FEMUR, INITIAL ENCOUNTER: ICD-10-CM

## 2022-07-20 DIAGNOSIS — M54.40 CHRONIC BILATERAL LOW BACK PAIN WITH SCIATICA, SCIATICA LATERALITY UNSPECIFIED: ICD-10-CM

## 2022-07-20 DIAGNOSIS — G25.81 RESTLESS LEG SYNDROME: ICD-10-CM

## 2022-07-20 DIAGNOSIS — M97.8XXA PERIPROSTHETIC SUPRACONDYLAR FRACTURE OF FEMUR, INITIAL ENCOUNTER: ICD-10-CM

## 2022-07-20 DIAGNOSIS — E66.01 CLASS 3 SEVERE OBESITY WITH SERIOUS COMORBIDITY AND BODY MASS INDEX (BMI) OF 40.0 TO 44.9 IN ADULT, UNSPECIFIED OBESITY TYPE (HCC): Primary | ICD-10-CM

## 2022-07-20 DIAGNOSIS — C50.919 MALIGNANT NEOPLASM OF FEMALE BREAST, UNSPECIFIED ESTROGEN RECEPTOR STATUS, UNSPECIFIED LATERALITY, UNSPECIFIED SITE OF BREAST (HCC): ICD-10-CM

## 2022-07-20 DIAGNOSIS — Z87.81 HISTORY OF FEMUR FRACTURE: ICD-10-CM

## 2022-07-20 DIAGNOSIS — J30.2 SEASONAL ALLERGIES: ICD-10-CM

## 2022-07-20 DIAGNOSIS — C50.912 INFILTRATING DUCTAL CARCINOMA OF LEFT BREAST (HCC): ICD-10-CM

## 2022-07-20 DIAGNOSIS — G47.00 INSOMNIA, UNSPECIFIED TYPE: ICD-10-CM

## 2022-07-20 DIAGNOSIS — G89.29 CHRONIC BILATERAL LOW BACK PAIN WITH SCIATICA, SCIATICA LATERALITY UNSPECIFIED: ICD-10-CM

## 2022-07-20 DIAGNOSIS — E11.9 TYPE 2 DIABETES MELLITUS WITHOUT COMPLICATION, WITHOUT LONG-TERM CURRENT USE OF INSULIN (HCC): ICD-10-CM

## 2022-07-20 PROCEDURE — 96372 THER/PROPH/DIAG INJ SC/IM: CPT | Performed by: NURSE PRACTITIONER

## 2022-07-20 PROCEDURE — 99214 OFFICE O/P EST MOD 30 MIN: CPT | Performed by: NURSE PRACTITIONER

## 2022-07-20 RX ORDER — OXYCODONE HYDROCHLORIDE AND ACETAMINOPHEN 5; 325 MG/1; MG/1
1-2 TABLET ORAL EVERY 4 HOURS PRN
Qty: 50 TABLET | Refills: 0 | Status: SHIPPED | OUTPATIENT
Start: 2022-07-20 | End: 2022-09-22 | Stop reason: SDUPTHER

## 2022-07-20 RX ORDER — METHYLPREDNISOLONE ACETATE 80 MG/ML
120 INJECTION, SUSPENSION INTRA-ARTICULAR; INTRALESIONAL; INTRAMUSCULAR; SOFT TISSUE ONCE
Status: COMPLETED | OUTPATIENT
Start: 2022-07-20 | End: 2022-07-20

## 2022-07-20 RX ORDER — PAROXETINE HYDROCHLORIDE 20 MG/1
20 TABLET, FILM COATED ORAL DAILY
Qty: 90 TABLET | Refills: 3 | Status: SHIPPED | OUTPATIENT
Start: 2022-07-20

## 2022-07-20 RX ORDER — ALPRAZOLAM 0.5 MG/1
TABLET ORAL
Qty: 90 TABLET | Refills: 0 | Status: SHIPPED | OUTPATIENT
Start: 2022-07-20 | End: 2022-09-22 | Stop reason: SDUPTHER

## 2022-07-20 RX ORDER — SEMAGLUTIDE 2.68 MG/ML
2 INJECTION, SOLUTION SUBCUTANEOUS WEEKLY
Qty: 9 ML | Refills: 1 | Status: SHIPPED | OUTPATIENT
Start: 2022-07-20

## 2022-07-20 RX ADMIN — METHYLPREDNISOLONE ACETATE 120 MG: 80 INJECTION, SUSPENSION INTRA-ARTICULAR; INTRALESIONAL; INTRAMUSCULAR; SOFT TISSUE at 10:11

## 2022-07-25 ASSESSMENT — ENCOUNTER SYMPTOMS
CHEST TIGHTNESS: 0
COUGH: 0
ABDOMINAL PAIN: 0
SHORTNESS OF BREATH: 0
WHEEZING: 0
BACK PAIN: 1
ABDOMINAL DISTENTION: 0

## 2022-07-25 NOTE — PROGRESS NOTES
unspecified type diabetes mellitus without mention of complication, not stated as uncontrolled       Past Surgical History:   Procedure Laterality Date    COLONOSCOPY  2016    DILATION AND CURETTAGE OF UTERUS  2017    ENDOMETRIAL ABLATION      FEMUR FRACTURE SURGERY Right 2021    FEMUR OPEN REDUCTION INTERNAL FIXATION performed by Artem Meyer MD at [address]  2011    Dr Fercho Sosa, [address] ([address] [facility])  12/15/2020    JOINT REPLACEMENT  12    Right total knee      LYMPHADENECTOMY  10/02/2020    MASTECTOMY Left     SALPINGO-OOPHORECTOMY N/A 12/15/2020    ROBOTIC HYSYER WITH BILATERAL SALPING-OOPHORECTOMY performed by Carlos Pham MD at [address]       Family History   Problem Relation Age of Onset    Heart Disease Mother         CHF    Cancer Father     COPD Father     Diabetes Father     High Blood Pressure Father     High Cholesterol Father      Social History     Tobacco Use    Smoking status: Former     Packs/day: 0.25     Years: 12.00     Pack years: 3.00     Types: Cigarettes     Quit date: 2000     Years since quittin.8    Smokeless tobacco: Never   Substance Use Topics    Alcohol use: Yes     Comment: rarely      Current Outpatient Medications   Medication Sig Dispense Refill    Semaglutide, 2 MG/DOSE, (OZEMPIC, 2 MG/DOSE,) 8 MG/3ML SOPN Inject 2 mg into the skin once a week 9 mL 1    PARoxetine (PAXIL) 20 MG tablet Take 1 tablet by mouth in the morning.  90 tablet 3    ALPRAZolam (XANAX) 0.5 MG tablet take 1 tablet by mouth three times a day if needed for anxiety or sleep 90 tablet 0    ondansetron (ZOFRAN) 4 MG tablet take 1 tablet by mouth once daily if needed for nausea and vomiting 30 tablet 0    cyclobenzaprine (FLEXERIL) 10 MG tablet take 1 tablet by mouth three times a day if needed for muscle spasm 40 tablet 0    metFORMIN (GLUCOPHAGE-XR) 500 MG extended release tablet Take 4 tablets by mouth daily 360 tablet 3    PARoxetine (PAXIL) 40 MG tablet take 1 tablet by mouth once daily 90 tablet 3    rOPINIRole (REQUIP) 0.5 MG tablet take 2 tablets by mouth at bedtime 180 tablet 3    oxyCODONE-acetaminophen (PERCOCET) 5-325 MG per tablet take 1 tablet by mouth every 6 hours if needed for pain      loratadine (CLARITIN) 10 MG tablet take 1 tablet by mouth once daily 90 tablet 3    fluticasone (FLONASE) 50 MCG/ACT nasal spray instill 1 spray into each nostril once daily 48 g 3    Blood Glucose Monitoring Suppl (ACCU-CHEK GUIDE) w/Device KIT 1 each by Does not apply route daily 1 kit 0    blood glucose test strips (ACCU-CHEK GUIDE) strip 1 each by In Vitro route daily As needed. 100 each 3    promethazine (PHENERGAN) 25 MG tablet Take 1 tablet by mouth every 8 hours as needed for Nausea 60 tablet 0    mometasone (ELOCON) 0.1 % cream Apply topically daily. 45 g 2    diclofenac sodium (VOLTAREN) 1 % GEL Apply 2 g topically 4 times daily 150 g 5    Glucosamine-Chondroitin (GLUCOSAMINE CHONDR COMPLEX PO) Take by mouth daily       oxyCODONE-acetaminophen (PERCOCET) 5-325 MG per tablet Take 1-2 tablets by mouth every 4 hours as needed for Pain for up to 7 days. 50 tablet 0     No current facility-administered medications for this visit.      Allergies   Allergen Reactions    Sulfa Antibiotics Itching    Adhesive Tape Rash and Other (See Comments)     Blisters with prolonged use    Hydrocodone-Acetaminophen Nausea And Vomiting     Health Maintenance   Topic Date Due    Pneumococcal 0-64 years Vaccine (1 - PCV) Never done    HIV screen  Never done    Hepatitis C screen  Never done    Hepatitis B vaccine (1 of 3 - Risk 3-dose series) Never done    Shingles vaccine (1 of 2) Never done    Diabetic microalbuminuria test  07/02/2019    Lipids  03/26/2020    COVID-19 Vaccine (2 - Pfizer series) 01/09/2022    Diabetic retinal exam  04/23/2022    Flu vaccine (1) 09/01/2022    Breast cancer screen 09/07/2022    A1C test (Diabetic or Prediabetic)  11/06/2022    Depression Screen  04/12/2023    Diabetic foot exam  04/14/2023    Colorectal Cancer Screen  03/08/2026    DTaP/Tdap/Td vaccine (2 - Td or Tdap) 09/22/2030    Hepatitis A vaccine  Aged Out    Hib vaccine  Aged Out    Meningococcal (ACWY) vaccine  Aged Out         Objective:     Physical Exam  Vitals and nursing note reviewed. Constitutional:       Appearance: Normal appearance. She is well-developed and normal weight. HENT:      Head: Normocephalic. Right Ear: Tympanic membrane and ear canal normal.      Left Ear: Tympanic membrane and ear canal normal.      Nose: Nose normal.      Mouth/Throat:      Pharynx: Oropharynx is clear. Eyes:      Extraocular Movements: Extraocular movements intact. Conjunctiva/sclera: Conjunctivae normal.   Cardiovascular:      Rate and Rhythm: Normal rate and regular rhythm. Pulses: Normal pulses. Heart sounds: Normal heart sounds. Pulmonary:      Effort: Pulmonary effort is normal.      Breath sounds: Normal breath sounds. Abdominal:      General: Bowel sounds are normal.      Palpations: Abdomen is soft. Musculoskeletal:         General: Normal range of motion. Cervical back: Neck supple. Skin:     General: Skin is warm and dry. Neurological:      General: No focal deficit present. Mental Status: She is alert and oriented to person, place, and time. Psychiatric:         Mood and Affect: Mood normal.         Thought Content: Thought content normal.         Judgment: Judgment normal.     /88   Pulse 96   Temp 98.1 °F (36.7 °C) (Oral)   Resp 16   Ht 5' 3.25\" (1.607 m)   Wt 207 lb (93.9 kg)   LMP 11/17/2020 (Approximate) Comment: tubal ligation/ablation  BMI 36.38 kg/m²       Impression/Plan:  1. Class 3 severe obesity with serious comorbidity and body mass index (BMI) of 40.0 to 44.9 in adult, unspecified obesity type ([address]    2.  Malignant neoplasm of female breast, unspecified estrogen receptor status, unspecified laterality, unspecified site of breast ([facility]    3. Type 2 diabetes mellitus without complication, without long-term current use of insulin ([facility]    4. Restless leg syndrome    5. Seasonal allergies    6. Seasonal allergic rhinitis, unspecified trigger    7. Infiltrating ductal carcinoma of left breast ([facility]    8. Insomnia, unspecified type    9. History of femur fracture    10. Chronic bilateral low back pain with sciatica, sciatica laterality unspecified      Requested Prescriptions     Signed Prescriptions Disp Refills    Semaglutide, 2 MG/DOSE, (OZEMPIC, 2 MG/DOSE,) 8 MG/3ML SOPN 9 mL 1     Sig: Inject 2 mg into the skin once a week    PARoxetine (PAXIL) 20 MG tablet 90 tablet 3     Sig: Take 1 tablet by mouth in the morning. ALPRAZolam (XANAX) 0.5 MG tablet 90 tablet 0     Sig: take 1 tablet by mouth three times a day if needed for anxiety or sleep     No orders of the defined types were placed in this encounter. Patient giveneducational materials - see patient instructions. Discussed use, benefit, and side effects of prescribed medications. All patient questions answered. Pt voiced understanding. Reviewed health maintenance. Patient agreedwith treatment plan. Follow up as directed. Increase Ozempic to 2 mg weekly. Increase Paxil to 60 mg p.o. daily. Xanax 0.5 mg 3 times daily as needed refilled #90. OARRS report reviewed. Continue medications as prescribed.  Educational information on above diagnosis  provided per AVS.  30 min    Electronically signed by JERI Brown CNP on 7/25/2022 at 3:12 PM

## 2022-08-03 ENCOUNTER — TELEPHONE (OUTPATIENT)
Dept: FAMILY MEDICINE CLINIC | Age: 52
End: 2022-08-03

## 2022-08-03 NOTE — TELEPHONE ENCOUNTER
Notified pt of response and she verbalized understanding. She is thinking eating after 8pm may have something to do with it.  Pt will keep us updated

## 2022-08-12 RX ORDER — CYCLOBENZAPRINE HCL 10 MG
TABLET ORAL
Qty: 40 TABLET | Refills: 0 | OUTPATIENT
Start: 2022-08-12

## 2022-08-12 RX ORDER — CYCLOBENZAPRINE HCL 10 MG
TABLET ORAL
Qty: 40 TABLET | Refills: 0 | Status: SHIPPED | OUTPATIENT
Start: 2022-08-12

## 2022-08-23 ENCOUNTER — TELEPHONE (OUTPATIENT)
Dept: FAMILY MEDICINE CLINIC | Age: 52
End: 2022-08-23

## 2022-08-23 NOTE — TELEPHONE ENCOUNTER
She has moved up to Ozempic 2 mg and is very constipated. She has tried Miralax daily with no relief. Asking if there is anything else she can try.      Sy  CPB

## 2022-08-23 NOTE — TELEPHONE ENCOUNTER
Continue MiraLAX daily as directed. Recommend stool softener daily. Also patient can try milk of magnesia as well as magnesium citrate over-the-counter.

## 2022-08-24 LAB
ALBUMIN SERPL-MCNC: 4.8 G/DL (ref 3.5–5.2)
ALK PHOSPHATASE: 111 U/L (ref 40–133)
ALT SERPL-CCNC: 48 U/L (ref 5–40)
ANION GAP SERPL CALCULATED.3IONS-SCNC: 15 MEQ/L (ref 7–16)
AST SERPL-CCNC: 35 U/L (ref 9–40)
AVERAGE GLUCOSE: 105 MG/DL
AVERAGE GLUCOSE: NORMAL
BILIRUB SERPL-MCNC: 0.7 MG/DL
BUN BLDV-MCNC: 13 MG/DL (ref 6–20)
CALCIUM SERPL-MCNC: 10.8 MG/DL (ref 8.5–10.5)
CHLORIDE BLD-SCNC: 100 MEQ/L (ref 95–107)
CHOLESTEROL, TOTAL: 213 MG/DL
CHOLESTEROL/HDL RATIO: 2.7 RATIO
CHOLESTEROL/HDL RATIO: ABNORMAL
CHOLESTEROL: 213 MG/DL
CO2: 29 MEQ/L (ref 19–31)
CREAT SERPL-MCNC: 0.69 MG/DL (ref 0.6–1.3)
EGFR IF NONAFRICAN AMERICAN: 104 ML/MIN/1.73
GLUCOSE: 93 MG/DL (ref 70–99)
HBA1C MFR BLD: 5.3 %
HBA1C MFR BLD: 5.3 % (ref 4.2–5.6)
HDLC SERPL-MCNC: 80 MG/DL
HDLC SERPL-MCNC: 80 MG/DL (ref 35–70)
INSULIN: 12 UIU/ML (ref 2–21)
LDL CHOLESTEROL CALCULATED: 112 MG/DL
LDL CHOLESTEROL CALCULATED: 112 MG/DL (ref 0–160)
LDL/HDL RATIO: 1.4 RATIO
NONHDLC SERPL-MCNC: ABNORMAL MG/DL
POTASSIUM SERPL-SCNC: 5.3 MEQ/L (ref 3.5–5.4)
SODIUM BLD-SCNC: 144 MEQ/L (ref 133–146)
T4 FREE: 1.32 NG/DL (ref 0.8–1.9)
TOTAL PROTEIN: 7.6 G/DL (ref 6.1–8.3)
TRIGL SERPL-MCNC: 106 MG/DL
TRIGL SERPL-MCNC: 106 MG/DL
TSH SERPL DL<=0.05 MIU/L-ACNC: 0.75 UIU/ML (ref 0.4–4.1)
VLDLC SERPL CALC-MCNC: 21 MG/DL
VLDLC SERPL CALC-MCNC: ABNORMAL MG/DL

## 2022-08-25 LAB — C-PEPTIDE: 4.2 NG/ML (ref 1.1–4.4)

## 2022-09-16 NOTE — ED NOTES
Bedside shift report given to this RN at this time by Shilpa Lorenzana RN. Patient is up in bed and updated on plan of care at this time. Patient is alert and oriented x4 and respirations are regular and unlabored.  Call light within reach     Rebecca Ferro RN  11/06/21 2675 Chief Complaint   Patient presents with    Annual Wellness Visit    Diabetes     Medicare wellness    1. \"Have you been to the ER, urgent care clinic since your last visit? Hospitalized since your last visit? \" No    2. \"Have you seen or consulted any other health care providers outside of the [address] [place] [address] since your last visit? \" No     3. For patients aged 39-70: Has the patient had a colonoscopy / FIT/ Cologuard? No last one was done by dr Harjeet Bal over 10 years ago       If the patient is female:    4. For patients aged 41-77: Has the patient had a mammogram within the past 2 years? Yes - no Care Gap present      5. For patients aged 21-65: Has the patient had a pap smear?  Yes - no Care Gap present

## 2022-09-22 ENCOUNTER — OFFICE VISIT (OUTPATIENT)
Dept: FAMILY MEDICINE CLINIC | Age: 52
End: 2022-09-22
Payer: COMMERCIAL

## 2022-09-22 VITALS
SYSTOLIC BLOOD PRESSURE: 96 MMHG | HEART RATE: 80 BPM | TEMPERATURE: 97.7 F | DIASTOLIC BLOOD PRESSURE: 70 MMHG | BODY MASS INDEX: 33.57 KG/M2 | RESPIRATION RATE: 16 BRPM | WEIGHT: 191 LBS

## 2022-09-22 DIAGNOSIS — R52 BODY ACHES: ICD-10-CM

## 2022-09-22 DIAGNOSIS — L40.4 GUTTATE PSORIASIS: ICD-10-CM

## 2022-09-22 DIAGNOSIS — Z20.822 SUSPECTED COVID-19 VIRUS INFECTION: ICD-10-CM

## 2022-09-22 DIAGNOSIS — Z96.659 PERIPROSTHETIC SUPRACONDYLAR FRACTURE OF FEMUR, SUBSEQUENT ENCOUNTER: ICD-10-CM

## 2022-09-22 DIAGNOSIS — J20.9 ACUTE BRONCHITIS, UNSPECIFIED ORGANISM: Primary | ICD-10-CM

## 2022-09-22 DIAGNOSIS — M97.8XXD PERIPROSTHETIC SUPRACONDYLAR FRACTURE OF FEMUR, SUBSEQUENT ENCOUNTER: ICD-10-CM

## 2022-09-22 DIAGNOSIS — G47.00 INSOMNIA, UNSPECIFIED TYPE: ICD-10-CM

## 2022-09-22 LAB
INFLUENZA VIRUS A RNA: NORMAL
INFLUENZA VIRUS B RNA: NORMAL
Lab: NORMAL
QC PASS/FAIL: NORMAL
SARS-COV-2 RDRP RESP QL NAA+PROBE: NEGATIVE

## 2022-09-22 PROCEDURE — 87635 SARS-COV-2 COVID-19 AMP PRB: CPT | Performed by: FAMILY MEDICINE

## 2022-09-22 PROCEDURE — 99214 OFFICE O/P EST MOD 30 MIN: CPT | Performed by: FAMILY MEDICINE

## 2022-09-22 PROCEDURE — 87502 INFLUENZA DNA AMP PROBE: CPT | Performed by: FAMILY MEDICINE

## 2022-09-22 RX ORDER — MOMETASONE FUROATE 1 MG/G
CREAM TOPICAL
Qty: 45 G | Refills: 2 | Status: SHIPPED | OUTPATIENT
Start: 2022-09-22

## 2022-09-22 RX ORDER — ALPRAZOLAM 0.5 MG/1
TABLET ORAL
Qty: 90 TABLET | Refills: 0 | Status: SHIPPED | OUTPATIENT
Start: 2022-09-22 | End: 2022-11-25

## 2022-09-22 RX ORDER — OXYCODONE HYDROCHLORIDE AND ACETAMINOPHEN 5; 325 MG/1; MG/1
1-2 TABLET ORAL EVERY 4 HOURS PRN
Qty: 50 TABLET | Refills: 0 | Status: SHIPPED | OUTPATIENT
Start: 2022-09-22 | End: 2022-09-29

## 2022-09-22 RX ORDER — CEFDINIR 300 MG/1
300 CAPSULE ORAL 2 TIMES DAILY
Qty: 20 CAPSULE | Refills: 0 | Status: SHIPPED | OUTPATIENT
Start: 2022-09-22 | End: 2022-10-02

## 2022-09-24 ASSESSMENT — ENCOUNTER SYMPTOMS
COUGH: 1
CHEST TIGHTNESS: 0
ABDOMINAL PAIN: 0
EYES NEGATIVE: 1
BACK PAIN: 0
NAUSEA: 0
SORE THROAT: 1
SHORTNESS OF BREATH: 0
VOMITING: 0
DIARRHEA: 0
RHINORRHEA: 0

## 2022-09-24 NOTE — PROGRESS NOTES
[address] [address] [address] [facility] [address]  Dept: [phone]  Dept Fax: [phone]  Loc: [phone]  PROGRESS NOTE      VisitDate: 9/22/2022    Temitope Roth is a 46 y.o. female who presents today for:     Chief Complaint   Patient presents with    Generalized Body Aches     Cough, sore throat, dizziness, stuffy nose, earache x3 days- home covid tests negative         Subjective:  HPI  Is in cough sore throat body aches congestion. Negative COVID testing at home. Intermittently productive cough. Has difficulty sleeping still has pain from her femur fracture, still dealing with psoriasis and is in need of for multiple refills. Review of Systems   Constitutional:  Positive for fatigue and fever. Negative for activity change and appetite change. HENT:  Positive for congestion and sore throat. Negative for rhinorrhea. Eyes: Negative. Respiratory:  Positive for cough. Negative for chest tightness and shortness of breath. Cardiovascular:  Negative for chest pain and palpitations. Gastrointestinal:  Negative for abdominal pain, diarrhea, nausea and vomiting. Genitourinary:  Negative for dysuria and urgency. Musculoskeletal:  Positive for arthralgias. Negative for back pain. Skin:  Positive for rash. Neurological:  Positive for dizziness. Negative for headaches. Psychiatric/Behavioral:  Negative for dysphoric mood. The patient is not nervous/anxious.     Past Medical History:   Diagnosis Date    Arthritis     Breast cancer ([facility]     H/O bladder infections     History of stomach ulcers     Knee cartilage, torn, left     PONV (postoperative nausea and vomiting)     Type II or unspecified type diabetes mellitus without mention of complication, not stated as uncontrolled       Past Surgical History:   Procedure Laterality Date    COLONOSCOPY  2016    DILATION AND CURETTAGE OF UTERUS  04/17/2017    ENDOMETRIAL ABLATION FEMUR FRACTURE SURGERY Right 2021    FEMUR OPEN REDUCTION INTERNAL FIXATION performed by Brook Soler MD at [address]  2011    Dr Lilia Leach, [address] ([facility]ERV[facility] REMOVED)  12/15/2020    JOINT REPLACEMENT  12    Right total knee      LYMPHADENECTOMY  10/02/2020    MASTECTOMY Left     SALPINGO-OOPHORECTOMY N/A 12/15/2020    ROBOTIC HYSYER WITH BILATERAL SALPING-OOPHORECTOMY performed by Cameron Rhodes MD at [address]       Family History   Problem Relation Age of Onset    Heart Disease Mother         CHF    Cancer Father     COPD Father     Diabetes Father     High Blood Pressure Father     High Cholesterol Father      Social History     Tobacco Use    Smoking status: Former     Packs/day: 0.25     Years: 12.00     Pack years: 3.00     Types: Cigarettes     Quit date: 2000     Years since quittin.0    Smokeless tobacco: Never   Substance Use Topics    Alcohol use: Yes     Comment: rarely      Current Outpatient Medications   Medication Sig Dispense Refill    oxyCODONE-acetaminophen (PERCOCET) 5-325 MG per tablet Take 1-2 tablets by mouth every 4 hours as needed for Pain for up to 7 days. 50 tablet 0    mometasone (ELOCON) 0.1 % cream Apply topically daily. 45 g 2    ALPRAZolam (XANAX) 0.5 MG tablet take 1 tablet by mouth three times a day if needed for anxiety or sleep 90 tablet 0    cefdinir (OMNICEF) 300 MG capsule Take 1 capsule by mouth 2 times daily for 10 days 20 capsule 0    cyclobenzaprine (FLEXERIL) 10 MG tablet Take 1 tablet by mouth three times a day if needed for muscle spasm (Patient not taking: Reported on 2022) 40 tablet 0    Semaglutide, 2 MG/DOSE, (OZEMPIC, 2 MG/DOSE,) 8 MG/3ML SOPN Inject 2 mg into the skin once a week 9 mL 1    PARoxetine (PAXIL) 20 MG tablet Take 1 tablet by mouth in the morning.  90 tablet 3    ondansetron (ZOFRAN) 4 MG tablet take 1 tablet by mouth once daily if needed for nausea and vomiting 30 tablet 0    metFORMIN (GLUCOPHAGE-XR) 500 MG extended release tablet Take 4 tablets by mouth daily 360 tablet 3    PARoxetine (PAXIL) 40 MG tablet take 1 tablet by mouth once daily 90 tablet 3    rOPINIRole (REQUIP) 0.5 MG tablet take 2 tablets by mouth at bedtime 180 tablet 3    oxyCODONE-acetaminophen (PERCOCET) 5-325 MG per tablet take 1 tablet by mouth every 6 hours if needed for pain      loratadine (CLARITIN) 10 MG tablet take 1 tablet by mouth once daily 90 tablet 3    fluticasone (FLONASE) 50 MCG/ACT nasal spray instill 1 spray into each nostril once daily 48 g 3    Blood Glucose Monitoring Suppl (ACCU-CHEK GUIDE) w/Device KIT 1 each by Does not apply route daily 1 kit 0    blood glucose test strips (ACCU-CHEK GUIDE) strip 1 each by In Vitro route daily As needed. 100 each 3    promethazine (PHENERGAN) 25 MG tablet Take 1 tablet by mouth every 8 hours as needed for Nausea 60 tablet 0    diclofenac sodium (VOLTAREN) 1 % GEL Apply 2 g topically 4 times daily 150 g 5    Glucosamine-Chondroitin (GLUCOSAMINE CHONDR COMPLEX PO) Take by mouth daily  (Patient not taking: Reported on 9/22/2022)       No current facility-administered medications for this visit.      Allergies   Allergen Reactions    Sulfa Antibiotics Itching    Adhesive Tape Rash and Other (See Comments)     Blisters with prolonged use    Hydrocodone-Acetaminophen Nausea And Vomiting     Health Maintenance   Topic Date Due    Hepatitis B vaccine (1 of 3 - 3-dose series) Never done    Pneumococcal 0-64 years Vaccine (1 - PCV) Never done    HIV screen  Never done    Hepatitis C screen  Never done    Shingles vaccine (1 of 2) Never done    Diabetic microalbuminuria test  07/02/2019    COVID-19 Vaccine (4 - Booster for Pfizer series) 04/19/2022    Diabetic retinal exam  04/23/2022    Flu vaccine (1) 08/01/2022    Breast cancer screen  09/07/2022    Depression Screen  04/12/2023    Diabetic foot exam  04/14/2023 A1C test (Diabetic or Prediabetic)  08/24/2023    Lipids  08/24/2023    Colorectal Cancer Screen  03/08/2026    DTaP/Tdap/Td vaccine (2 - Td or Tdap) 09/22/2030    Hepatitis A vaccine  Aged Out    Hib vaccine  Aged Out    Meningococcal (ACWY) vaccine  Aged Out         Objective:     Physical Exam  Constitutional:       General: She is not in acute distress. Appearance: Normal appearance. She is well-developed. She is not diaphoretic. HENT:      Head: Normocephalic and atraumatic. Right Ear: External ear normal.      Left Ear: External ear normal.      Nose: Congestion and rhinorrhea present. Mouth/Throat:      Pharynx: Oropharynx is clear. Eyes:      General: No scleral icterus. Conjunctiva/sclera: Conjunctivae normal.   Neck:      Thyroid: No thyromegaly. Vascular: No JVD. Comments: No bruits  Cardiovascular:      Rate and Rhythm: Normal rate and regular rhythm. Heart sounds: Normal heart sounds. Pulmonary:      Effort: Pulmonary effort is normal. No respiratory distress. Breath sounds: Rhonchi present. No wheezing or rales. Abdominal:      Palpations: Abdomen is soft. There is no mass. Tenderness: There is no abdominal tenderness. There is no guarding. Musculoskeletal:         General: No tenderness. Skin:     General: Skin is warm and dry. Findings: Erythema present. No rash. Neurological:      Mental Status: She is alert and oriented to person, place, and time. Mental status is at baseline. Cranial Nerves: No cranial nerve deficit. Psychiatric:         Mood and Affect: Mood normal.     BP 96/70   Pulse 80   Temp 97.7 °F (36.5 °C) (Oral)   Resp 16   Wt 191 lb (86.6 kg)   LMP 11/17/2020 (Approximate) Comment: tubal ligation/ablation  BMI 33.57 kg/m²   Negative    Impression/Plan:  1. Acute bronchitis, unspecified organism    2. Guttate psoriasis    3. Insomnia, unspecified type    4.  Periprosthetic supracondylar fracture of femur, subsequent encounter    5. Suspected COVID-19 virus infection    6. Body aches      Requested Prescriptions     Signed Prescriptions Disp Refills    oxyCODONE-acetaminophen (PERCOCET) 5-325 MG per tablet 50 tablet 0     Sig: Take 1-2 tablets by mouth every 4 hours as needed for Pain for up to 7 days. mometasone (ELOCON) 0.1 % cream 45 g 2     Sig: Apply topically daily. ALPRAZolam (XANAX) 0.5 MG tablet 90 tablet 0     Sig: take 1 tablet by mouth three times a day if needed for anxiety or sleep    cefdinir (OMNICEF) 300 MG capsule 20 capsule 0     Sig: Take 1 capsule by mouth 2 times daily for 10 days     Orders Placed This Encounter   Procedures    POCT COVID-19 Rapid, NAAT     Order Specific Question:   Is this test for diagnosis or screening? Answer:   Diagnosis of ill patient     Order Specific Question:   Symptomatic for COVID-19 as defined by CDC? Answer:   Yes     Order Specific Question:   Date of Symptom Onset     Answer:   9/20/2022     Order Specific Question:   Hospitalized for COVID-19? Answer:   No     Order Specific Question:   Admitted to ICU for COVID-19? Answer:   No     Order Specific Question:   Employed in healthcare setting? Answer:   No     Order Specific Question:   Resident in a congregate (group) care setting? Answer:   No     Order Specific Question:   Pregnant? Answer:   No     Order Specific Question:   Previously tested for COVID-19? Answer:   Yes    POCT Influenza A/B DNA ([facility])       Patient giveneducational materials - see patient instructions. Discussed use, benefit, and side effects of prescribed medications. All patient questions answered. Pt voiced understanding. Reviewed health maintenance. Patient agreedwith treatment plan. Follow up as directed. **This report has been created using voice recognition software. It may contain minor errorswhich are inherent in voice recognition technology. **       Electronically signed by Nora Peña Yoana Menjivar MD on 9/24/2022 at 9:17 AM

## 2022-10-21 ENCOUNTER — OFFICE VISIT (OUTPATIENT)
Dept: FAMILY MEDICINE CLINIC | Age: 52
End: 2022-10-21
Payer: COMMERCIAL

## 2022-10-21 VITALS
SYSTOLIC BLOOD PRESSURE: 108 MMHG | HEART RATE: 90 BPM | WEIGHT: 186 LBS | TEMPERATURE: 97.8 F | RESPIRATION RATE: 18 BRPM | BODY MASS INDEX: 32.69 KG/M2 | DIASTOLIC BLOOD PRESSURE: 70 MMHG

## 2022-10-21 DIAGNOSIS — Z13.220 SCREENING CHOLESTEROL LEVEL: ICD-10-CM

## 2022-10-21 DIAGNOSIS — Z98.84 HX OF LAPAROSCOPIC GASTRIC BANDING: ICD-10-CM

## 2022-10-21 DIAGNOSIS — K31.84 GASTRIC PARESIS: ICD-10-CM

## 2022-10-21 DIAGNOSIS — Z12.11 COLON CANCER SCREENING: ICD-10-CM

## 2022-10-21 DIAGNOSIS — R11.2 NAUSEA AND VOMITING, UNSPECIFIED VOMITING TYPE: ICD-10-CM

## 2022-10-21 DIAGNOSIS — C50.912 INFILTRATING DUCTAL CARCINOMA OF LEFT BREAST (HCC): ICD-10-CM

## 2022-10-21 DIAGNOSIS — C50.919 MALIGNANT NEOPLASM OF FEMALE BREAST, UNSPECIFIED ESTROGEN RECEPTOR STATUS, UNSPECIFIED LATERALITY, UNSPECIFIED SITE OF BREAST (HCC): ICD-10-CM

## 2022-10-21 DIAGNOSIS — F41.9 ANXIETY: ICD-10-CM

## 2022-10-21 DIAGNOSIS — E66.01 CLASS 3 SEVERE OBESITY WITH SERIOUS COMORBIDITY AND BODY MASS INDEX (BMI) OF 40.0 TO 44.9 IN ADULT, UNSPECIFIED OBESITY TYPE (HCC): Primary | ICD-10-CM

## 2022-10-21 DIAGNOSIS — R11.0 NAUSEA: ICD-10-CM

## 2022-10-21 DIAGNOSIS — G25.81 RESTLESS LEG SYNDROME: ICD-10-CM

## 2022-10-21 DIAGNOSIS — E11.9 TYPE 2 DIABETES MELLITUS WITHOUT COMPLICATION, WITHOUT LONG-TERM CURRENT USE OF INSULIN (HCC): ICD-10-CM

## 2022-10-21 LAB
BILIRUBIN, POC: ABNORMAL
BLOOD URINE, POC: ABNORMAL
CLARITY, POC: CLEAR
COLOR, POC: ABNORMAL
GLUCOSE URINE, POC: ABNORMAL
KETONES, POC: ABNORMAL
LEUKOCYTE EST, POC: ABNORMAL
NITRITE, POC: POSITIVE
PH, POC: 5.5
PROTEIN, POC: ABNORMAL
SPECIFIC GRAVITY, POC: >=1.03
UROBILINOGEN, POC: 0.2

## 2022-10-21 PROCEDURE — 90471 IMMUNIZATION ADMIN: CPT | Performed by: NURSE PRACTITIONER

## 2022-10-21 PROCEDURE — 99214 OFFICE O/P EST MOD 30 MIN: CPT | Performed by: NURSE PRACTITIONER

## 2022-10-21 PROCEDURE — 90674 CCIIV4 VAC NO PRSV 0.5 ML IM: CPT | Performed by: NURSE PRACTITIONER

## 2022-10-21 PROCEDURE — 81003 URINALYSIS AUTO W/O SCOPE: CPT | Performed by: NURSE PRACTITIONER

## 2022-10-21 PROCEDURE — 3044F HG A1C LEVEL LT 7.0%: CPT | Performed by: NURSE PRACTITIONER

## 2022-10-21 RX ORDER — ROPINIROLE 0.5 MG/1
TABLET, FILM COATED ORAL
Qty: 180 TABLET | Refills: 3 | Status: SHIPPED | OUTPATIENT
Start: 2022-10-21

## 2022-10-21 ASSESSMENT — ENCOUNTER SYMPTOMS
CHEST TIGHTNESS: 0
NAUSEA: 1
DIARRHEA: 0
WHEEZING: 0
BACK PAIN: 0
CONSTIPATION: 1
ANAL BLEEDING: 0
COUGH: 0
SHORTNESS OF BREATH: 0
ABDOMINAL DISTENTION: 0
VOMITING: 1
ABDOMINAL PAIN: 0
BLOOD IN STOOL: 0

## 2022-10-21 NOTE — PROGRESS NOTES
Immunizations Administered       Name Date Dose Route    Influenza, FLUCELVAX, (age 10 mo+), MDCK, PF, 0.5mL 10/21/2022 0.5 mL Intramuscular    Site: Deltoid- Right    Lot: [id number]    NDC: 98713-952-07

## 2022-10-21 NOTE — PROGRESS NOTES
[address] Debbie Duff [facility] [address]  Dept: [phone]  Dept Fax: [phone]  Loc: [phone]  PROGRESS NOTE      VisitDate: 10/21/2022    Kp Feldman is a 46 y.o. female who presents today for:     Chief Complaint   Patient presents with    3 Month Follow-Up     DM-possible DKA last week?, shakey-normal bs, insomnia, still having some nausea with meds, has appt set for [facility] order needed    Flu Vaccine         Subjective:  3-month follow-up diabetes. Patient does report intermittent nausea and vomiting as well as constipation associated with Ozempic. Reports that last week had an episode of shakiness normal blood sugar. History of a type 2 diabetes gastric ulcers breast cancer arthritis obesity. Patient following a low-carb diet. Denies any chest pain headache syncope shortness of breath abdominal pain numbness or edema. History of lap band procedure. Review of Systems   Constitutional:  Positive for fatigue. Negative for activity change, appetite change, chills and fever. Eyes:  Negative for visual disturbance. Respiratory:  Negative for cough, chest tightness, shortness of breath and wheezing. Cardiovascular:  Negative for chest pain, palpitations and leg swelling. Gastrointestinal:  Positive for constipation, nausea and vomiting. Negative for abdominal distention, abdominal pain, anal bleeding, blood in stool and diarrhea. Genitourinary:  Negative for dysuria. Musculoskeletal:  Positive for arthralgias. Negative for back pain and neck pain. Skin: Negative. Negative for rash. Neurological:  Negative for dizziness, light-headedness and headaches. Hematological:  Negative for adenopathy. Psychiatric/Behavioral:  Positive for decreased concentration and dysphoric mood. The patient is nervous/anxious. All other systems reviewed and are negative.   Past Medical History:   Diagnosis Date Arthritis     Breast cancer ([facility] [address]     H/O bladder infections     History of stomach ulcers     Knee cartilage, torn, left     PONV (postoperative nausea and vomiting)     Type II or unspecified type diabetes mellitus without mention of complication, not stated as uncontrolled       Past Surgical History:   Procedure Laterality Date    COLONOSCOPY      DILATION AND CURETTAGE OF UTERUS  2017    ENDOMETRIAL ABLATION      FEMUR FRACTURE SURGERY Right 2021    FEMUR OPEN REDUCTION INTERNAL FIXATION performed by Cecelia Reynoso MD at [address]  2011    Dr Betty Light, [address] (CERVIX REMOVED)  12/15/2020    JOINT REPLACEMENT  12    Right total knee      LYMPHADENECTOMY  10/02/2020    MASTECTOMY Left     SALPINGO-OOPHORECTOMY N/A 12/15/2020    ROBOTIC HYSYER WITH BILATERAL SALPING-OOPHORECTOMY performed by Ainsley Robbins MD at [address]       Family History   Problem Relation Age of Onset    Heart Disease Mother         CHF    Cancer Father     COPD Father     Diabetes Father     High Blood Pressure Father     High Cholesterol Father      Social History     Tobacco Use    Smoking status: Former     Packs/day: 0.25     Years: 12.00     Pack years: 3.00     Types: Cigarettes     Quit date: 2000     Years since quittin.1    Smokeless tobacco: Never   Substance Use Topics    Alcohol use: Yes     Comment: rarely      Current Outpatient Medications   Medication Sig Dispense Refill    rOPINIRole (REQUIP) 0.5 MG tablet take 2 tablets by mouth at bedtime 180 tablet 3    mometasone (ELOCON) 0.1 % cream Apply topically daily.  45 g 2    ALPRAZolam (XANAX) 0.5 MG tablet take 1 tablet by mouth three times a day if needed for anxiety or sleep 90 tablet 0    cyclobenzaprine (FLEXERIL) 10 MG tablet Take 1 tablet by mouth three times a day if needed for muscle spasm 40 tablet 0    Semaglutide, 2 MG/DOSE, (OZEMPIC, 2 MG/DOSE,) 8 MG/3ML SOPN Inject 2 mg into the skin once a week 9 mL 1    PARoxetine (PAXIL) 20 MG tablet Take 1 tablet by mouth in the morning. 90 tablet 3    ondansetron (ZOFRAN) 4 MG tablet take 1 tablet by mouth once daily if needed for nausea and vomiting 30 tablet 0    PARoxetine (PAXIL) 40 MG tablet take 1 tablet by mouth once daily 90 tablet 3    oxyCODONE-acetaminophen (PERCOCET) 5-325 MG per tablet take 1 tablet by mouth every 6 hours if needed for pain      loratadine (CLARITIN) 10 MG tablet take 1 tablet by mouth once daily 90 tablet 3    fluticasone (FLONASE) 50 MCG/ACT nasal spray instill 1 spray into each nostril once daily 48 g 3    Blood Glucose Monitoring Suppl (ACCU-CHEK GUIDE) w/Device KIT 1 each by Does not apply route daily 1 kit 0    blood glucose test strips (ACCU-CHEK GUIDE) strip 1 each by In Vitro route daily As needed. 100 each 3    promethazine (PHENERGAN) 25 MG tablet Take 1 tablet by mouth every 8 hours as needed for Nausea 60 tablet 0    diclofenac sodium (VOLTAREN) 1 % GEL Apply 2 g topically 4 times daily 150 g 5    Glucosamine-Chondroitin (GLUCOSAMINE CHONDR COMPLEX PO) Take by mouth daily  (Patient not taking: No sig reported)       No current facility-administered medications for this visit.      Allergies   Allergen Reactions    Sulfa Antibiotics Itching    Adhesive Tape Rash and Other (See Comments)     Blisters with prolonged use    Hydrocodone-Acetaminophen Nausea And Vomiting     Health Maintenance   Topic Date Due    Pneumococcal 0-64 years Vaccine (1 - PCV) Never done    HIV screen  Never done    Hepatitis C screen  Never done    Hepatitis B vaccine (1 of 3 - Risk 3-dose series) Never done    Shingles vaccine (1 of 2) Never done    Diabetic microalbuminuria test  07/02/2019    COVID-19 Vaccine (4 - Booster for Cliptone series) 02/13/2022    Diabetic retinal exam  04/23/2022    Breast cancer screen  09/07/2022    Depression Screen  04/12/2023    Diabetic foot exam 04/14/2023    A1C test (Diabetic or Prediabetic)  08/24/2023    Lipids  08/24/2023    Colorectal Cancer Screen  03/08/2026    DTaP/Tdap/Td vaccine (2 - Td or Tdap) 09/22/2030    Flu vaccine  Completed    Hepatitis A vaccine  Aged Out    Hib vaccine  Aged Out    Meningococcal (ACWY) vaccine  Aged Out         Objective:     Physical Exam  Vitals and nursing note reviewed. Constitutional:       Appearance: Normal appearance. She is well-developed and normal weight. HENT:      Head: Normocephalic. Right Ear: Tympanic membrane and ear canal normal.      Left Ear: Tympanic membrane and ear canal normal.      Nose: Nose normal.      Mouth/Throat:      Pharynx: Oropharynx is clear. Eyes:      Extraocular Movements: Extraocular movements intact. Conjunctiva/sclera: Conjunctivae normal.   Cardiovascular:      Rate and Rhythm: Normal rate and regular rhythm. Pulses: Normal pulses. Heart sounds: Normal heart sounds. Pulmonary:      Effort: Pulmonary effort is normal.      Breath sounds: Normal breath sounds. Abdominal:      General: Bowel sounds are normal.      Palpations: Abdomen is soft. Musculoskeletal:         General: Normal range of motion. Cervical back: Neck supple. Skin:     General: Skin is warm and dry. Neurological:      General: No focal deficit present. Mental Status: She is alert and oriented to person, place, and time. Psychiatric:         Mood and Affect: Mood normal.         Thought Content:  Thought content normal.         Judgment: Judgment normal.     /70 (Site: Right Upper Arm)   Pulse 90   Temp 97.8 °F (36.6 °C) (Oral)   Resp 18   Wt 186 lb (84.4 kg)   LMP 11/17/2020 (Approximate) Comment: tubal ligation/ablation  BMI 32.69 kg/m²     21/22 1138     Color, UA dark yellow    Clarity, UA clear    Glucose, UA POC neg    Bilirubin, UA small    Ketones, UA trace    Spec Grav, UA >=1.030    Blood, UA POC trace-intact    pH, UA 5.5    Protein, UA POC trace    Urobilinogen, UA 0.2    Leukocytes, UA small    Nitrite, UA positive               Specimen Collected: 10/21/22 11:38 EDT Last Resulted: 10/21/22 11:38 EDT        Lab Flowsheet     Order Details     View Encounter     Lab and Collection Details     Routing     Result History     View Encounter Conversation           Result Care Coordination      Patient Communication     Add Comments   Add Notifications  Back to Top           Result Information    Flag: Abnormal Abnormal   Status: Final result (Collected: 10/21/2022 11:38) Provider Status: Open       POCT Urinalysis No Micro (Auto): Patient Communication     Add Comments   Add Notifications      Routing History    Priority Sent On From To Message Type    10/21/2022 11:40 AM [address], APRN - CNP Results     Click to Print Result  View SmartLink Info    POCT Urinalysis No Micro [employer]) (Order #[id number]) on 10/21/22     Order Report     Order Details  Impression/Plan:  1. Class 3 severe obesity with serious comorbidity and body mass index (BMI) of 40.0 to 44.9 in adult, unspecified obesity type ([address]    2. Restless leg syndrome    3. Malignant neoplasm of female breast, unspecified estrogen receptor status, unspecified laterality, unspecified site of breast ([address]    4. Type 2 diabetes mellitus without complication, without long-term current use of insulin ([address]    5. Infiltrating ductal carcinoma of left breast ([address]    6. Nausea    7. Anxiety    8. Nausea and vomiting, unspecified vomiting type    9. Gastric paresis    10. Colon cancer screening    11. Screening cholesterol level    12.  Hx of laparoscopic gastric banding      Requested Prescriptions     Signed Prescriptions Disp Refills    rOPINIRole (REQUIP) 0.5 MG tablet 180 tablet 3     Sig: take 2 tablets by mouth at bedtime     Orders Placed This Encounter   Procedures    Culture, Urine     Standing Status:   Future     Number of Occurrences:   1     Standing Expiration Date: 10/21/2023     Order Specific Question:   Specify (ex-cath, midstream, cysto, etc)? Answer:   midstream    Influenza, FLUCELVAX, (age 10 mo+), IM, Preservative Free, 0.5 mL    Comprehensive Metabolic Panel     Standing Status:   Future     Standing Expiration Date:   10/21/2023    Lipid Panel     Standing Status:   Future     Standing Expiration Date:   10/21/2023     Order Specific Question:   Is Patient Fasting?/# of Hours     Answer:   yes/12    T4, Free     Standing Status:   Future     Standing Expiration Date:   10/21/2023    TSH     Standing Status:   Future     Standing Expiration Date:   10/21/2023    CBC with Auto Differential     Standing Status:   Future     Standing Expiration Date:   10/21/2023    Hemoglobin A1C     Standing Status:   Future     Standing Expiration Date:   10/21/2023    [facility] - Holden Kaba MD, Gastroenterology, [facility]     Referral Priority:   Routine     Referral Type:   Eval and Treat     Referral Reason:   Specialty Services Required     Referred to Provider:   Annette Foster MD     Requested Specialty:   Gastroenterology     Number of Visits Requested:   1    POCT Urinalysis No Micro (Auto)       Patient giveneducational materials - see patient instructions. Discussed use, benefit, and side effects of prescribed medications. All patient questions answered. Pt voiced understanding. Reviewed health maintenance. Patient agreedwith treatment plan. Follow up as directed. Consult with GI regarding screening colonoscopy as well as evaluation for gastric paresis. Continue medications as prescribed. Educational information on above diagnosis  provided per AVS.  Continue low-carb diet. CBC CMP FLP A1c TSH free T4 ordered. Follow-up 3 months. Urine sent for culture. Influenza vaccination updated today. Requip refilled. Fluids encouraged on a daily basis. Discontinue metformin.      30 min  Electronically signed by JERI Mccormick CNP on 10/21/2022 at 11:53 AM

## 2022-10-23 LAB
ORGANISM: ABNORMAL
URINE CULTURE, ROUTINE: ABNORMAL

## 2022-10-24 ENCOUNTER — TELEPHONE (OUTPATIENT)
Dept: FAMILY MEDICINE CLINIC | Age: 52
End: 2022-10-24

## 2022-10-24 RX ORDER — PANTOPRAZOLE SODIUM 40 MG/1
40 TABLET, DELAYED RELEASE ORAL
Qty: 90 TABLET | Refills: 1 | Status: SHIPPED | OUTPATIENT
Start: 2022-10-24

## 2022-10-24 RX ORDER — NITROFURANTOIN 25; 75 MG/1; MG/1
100 CAPSULE ORAL 2 TIMES DAILY
Qty: 20 CAPSULE | Refills: 0 | Status: SHIPPED | OUTPATIENT
Start: 2022-10-24 | End: 2022-11-03

## 2022-10-24 NOTE — TELEPHONE ENCOUNTER
1) Patient states you had told her at her appt Friday you would send in Protonix for her.      2) Please sign pended Rx for Macrobid    Will call pharmacy   RA--Elm

## 2022-10-26 ENCOUNTER — TRANSCRIBE ORDERS (OUTPATIENT)
Dept: ADMINISTRATIVE | Age: 52
End: 2022-10-26

## 2022-10-26 DIAGNOSIS — R11.2 NAUSEA AND VOMITING, UNSPECIFIED VOMITING TYPE: Primary | ICD-10-CM

## 2022-10-26 DIAGNOSIS — E11.9 TYPE 2 DIABETES MELLITUS WITHOUT COMPLICATION, UNSPECIFIED WHETHER LONG TERM INSULIN USE (HCC): ICD-10-CM

## 2022-11-14 RX ORDER — LORATADINE 10 MG/1
TABLET ORAL
Qty: 90 TABLET | Refills: 3 | Status: SHIPPED | OUTPATIENT
Start: 2022-11-14

## 2022-11-29 ENCOUNTER — HOSPITAL ENCOUNTER (OUTPATIENT)
Dept: NUCLEAR MEDICINE | Age: 52
Discharge: HOME | End: 2022-11-29
Payer: COMMERCIAL

## 2022-11-29 DIAGNOSIS — R11.2 NAUSEA AND VOMITING, UNSPECIFIED VOMITING TYPE: ICD-10-CM

## 2022-11-29 DIAGNOSIS — E11.9 TYPE 2 DIABETES MELLITUS WITHOUT COMPLICATION, UNSPECIFIED WHETHER LONG TERM INSULIN USE (HCC): ICD-10-CM

## 2022-11-29 PROCEDURE — A9541 TC99M SULFUR COLLOID: HCPCS | Performed by: NURSE PRACTITIONER

## 2022-11-29 PROCEDURE — 78264 GASTRIC EMPTYING IMG STUDY: CPT

## 2022-11-29 PROCEDURE — 3430000000 HC RX DIAGNOSTIC RADIOPHARMACEUTICAL: Performed by: NURSE PRACTITIONER

## 2022-11-29 RX ADMIN — Medication 1 MILLICURIE: at 08:32

## 2022-12-05 DIAGNOSIS — G47.00 INSOMNIA, UNSPECIFIED TYPE: ICD-10-CM

## 2022-12-06 RX ORDER — ALPRAZOLAM 0.5 MG/1
TABLET ORAL
Qty: 90 TABLET | Refills: 2 | Status: SHIPPED | OUTPATIENT
Start: 2022-12-06 | End: 2023-03-06

## 2023-01-03 RX ORDER — ONDANSETRON 4 MG/1
TABLET, FILM COATED ORAL
Qty: 30 TABLET | Refills: 0 | Status: SHIPPED | OUTPATIENT
Start: 2023-01-03

## 2023-01-03 RX ORDER — SEMAGLUTIDE 2.68 MG/ML
INJECTION, SOLUTION SUBCUTANEOUS
Qty: 9 ML | Refills: 1 | Status: SHIPPED | OUTPATIENT
Start: 2023-01-03

## 2023-01-03 NOTE — TELEPHONE ENCOUNTER
Last visit- 10/21/2022  Next visit- 1/3/2023    Requested Prescriptions     Pending Prescriptions Disp Refills    OZEMPIC, 2 MG/DOSE, 8 MG/3ML SOPN [Pharmacy Med Name: OZEMPIC 2 MG/DOSE (8 MG/3 ML)] 9 mL 1     Sig: inject 2 milligram subcutaneously ONCE A WEEK

## 2023-01-03 NOTE — TELEPHONE ENCOUNTER
Last visit- 10/21/2022  Next visit- 1/19/2023    Requested Prescriptions     Pending Prescriptions Disp Refills    ondansetron (ZOFRAN) 4 MG tablet [Pharmacy Med Name: ONDANSETRON HCL 4 MG TABLET] 30 tablet 0     Sig: take 1 tablet by mouth once daily if needed for nausea and vomiting

## 2023-01-13 RX ORDER — PAROXETINE HYDROCHLORIDE 40 MG/1
TABLET, FILM COATED ORAL
Qty: 90 TABLET | Refills: 2 | Status: SHIPPED | OUTPATIENT
Start: 2023-01-13

## 2023-01-20 ENCOUNTER — OFFICE VISIT (OUTPATIENT)
Dept: FAMILY MEDICINE CLINIC | Age: 53
End: 2023-01-20
Payer: COMMERCIAL

## 2023-01-20 VITALS
DIASTOLIC BLOOD PRESSURE: 64 MMHG | TEMPERATURE: 97.9 F | OXYGEN SATURATION: 97 % | RESPIRATION RATE: 16 BRPM | HEART RATE: 107 BPM | SYSTOLIC BLOOD PRESSURE: 110 MMHG | BODY MASS INDEX: 31.05 KG/M2 | WEIGHT: 176.7 LBS

## 2023-01-20 DIAGNOSIS — E11.9 TYPE 2 DIABETES MELLITUS WITHOUT COMPLICATION, WITHOUT LONG-TERM CURRENT USE OF INSULIN (HCC): Primary | Chronic | ICD-10-CM

## 2023-01-20 DIAGNOSIS — R11.0 NAUSEA: ICD-10-CM

## 2023-01-20 DIAGNOSIS — M97.8XXD PERIPROSTHETIC SUPRACONDYLAR FRACTURE OF FEMUR, SUBSEQUENT ENCOUNTER: ICD-10-CM

## 2023-01-20 DIAGNOSIS — Z96.659 PERIPROSTHETIC SUPRACONDYLAR FRACTURE OF FEMUR, SUBSEQUENT ENCOUNTER: ICD-10-CM

## 2023-01-20 DIAGNOSIS — K59.00 CONSTIPATION, UNSPECIFIED CONSTIPATION TYPE: ICD-10-CM

## 2023-01-20 PROCEDURE — 99214 OFFICE O/P EST MOD 30 MIN: CPT | Performed by: FAMILY MEDICINE

## 2023-01-20 RX ORDER — OXYCODONE HYDROCHLORIDE AND ACETAMINOPHEN 5; 325 MG/1; MG/1
1-2 TABLET ORAL EVERY 4 HOURS PRN
Qty: 50 TABLET | Refills: 0 | Status: SHIPPED | OUTPATIENT
Start: 2023-01-20 | End: 2023-01-27

## 2023-01-20 SDOH — ECONOMIC STABILITY: FOOD INSECURITY: WITHIN THE PAST 12 MONTHS, THE FOOD YOU BOUGHT JUST DIDN'T LAST AND YOU DIDN'T HAVE MONEY TO GET MORE.: NEVER TRUE

## 2023-01-20 SDOH — ECONOMIC STABILITY: FOOD INSECURITY: WITHIN THE PAST 12 MONTHS, YOU WORRIED THAT YOUR FOOD WOULD RUN OUT BEFORE YOU GOT MONEY TO BUY MORE.: NEVER TRUE

## 2023-01-20 ASSESSMENT — SOCIAL DETERMINANTS OF HEALTH (SDOH): HOW HARD IS IT FOR YOU TO PAY FOR THE VERY BASICS LIKE FOOD, HOUSING, MEDICAL CARE, AND HEATING?: NOT HARD AT ALL

## 2023-01-20 ASSESSMENT — PATIENT HEALTH QUESTIONNAIRE - PHQ9
SUM OF ALL RESPONSES TO PHQ9 QUESTIONS 1 & 2: 1
SUM OF ALL RESPONSES TO PHQ QUESTIONS 1-9: 1
1. LITTLE INTEREST OR PLEASURE IN DOING THINGS: 0
SUM OF ALL RESPONSES TO PHQ QUESTIONS 1-9: 1
2. FEELING DOWN, DEPRESSED OR HOPELESS: 1
SUM OF ALL RESPONSES TO PHQ QUESTIONS 1-9: 1
SUM OF ALL RESPONSES TO PHQ QUESTIONS 1-9: 1

## 2023-01-22 ASSESSMENT — ENCOUNTER SYMPTOMS
DIARRHEA: 0
BACK PAIN: 0
CHEST TIGHTNESS: 0
COUGH: 0
VOMITING: 0
RHINORRHEA: 0
CONSTIPATION: 1
ABDOMINAL PAIN: 0
SHORTNESS OF BREATH: 0
EYES NEGATIVE: 1
SORE THROAT: 0
NAUSEA: 1

## 2023-01-22 NOTE — PROGRESS NOTES
[address] [place] [facility] [address]  Dept: [phone]  Dept Fax: [phone]  Loc: [phone]  PROGRESS NOTE      VisitDate: 1/20/2023    Monika Davies is a 45 y.o. female who presents today for:     Chief Complaint   Patient presents with    3 Month Follow-Up     DM-on ozempic, insomnia, anxiety, discuss linzess-72 dose was too little, 290 dose is too much    Leg Pain     Madelaine Hudson at [facility]?, takes percocet for leg pain    Insomnia     Usually takes xanax for sleep, waking at night         Subjective:  HPI  Chronic pain due to leg fracture. She had a periprosthetic fracture remotely continues to have significant pain she would like to have another opinion on this. Follow-up diabetes stable well-controlled weight is down. Continues to have some nausea and constipation as well doing well with current medications    Review of Systems   Constitutional:  Negative for activity change, appetite change, fatigue and fever. HENT:  Negative for congestion, rhinorrhea and sore throat. Eyes: Negative. Respiratory:  Negative for cough, chest tightness and shortness of breath. Cardiovascular:  Negative for chest pain and palpitations. Gastrointestinal:  Positive for constipation and nausea. Negative for abdominal pain, diarrhea and vomiting. Genitourinary:  Negative for dysuria and urgency. Musculoskeletal:  Positive for arthralgias. Negative for back pain. Neurological:  Negative for dizziness and headaches. Psychiatric/Behavioral:  Negative for dysphoric mood. The patient is not nervous/anxious.     Past Medical History:   Diagnosis Date    Arthritis     Breast cancer ([address]     H/O bladder infections     History of stomach ulcers     Knee cartilage, torn, left     PONV (postoperative nausea and vomiting)     Type II or unspecified type diabetes mellitus without mention of complication, not stated as uncontrolled Past Surgical History:   Procedure Laterality Date    COLONOSCOPY  2016    DILATION AND CURETTAGE OF UTERUS  2017    ENDOMETRIAL ABLATION      FEMUR FRACTURE SURGERY Right 2021    FEMUR OPEN REDUCTION INTERNAL FIXATION performed by Eduard Wray MD at [address]  2011    Dr Adelita Carrington, [address] [place] ([facility] [place])  12/15/2020    JOINT REPLACEMENT  12    Right total knee      LYMPHADENECTOMY  10/02/2020    MASTECTOMY Left     SALPINGO-OOPHORECTOMY N/A 12/15/2020    ROBOTIC HYSYER WITH BILATERAL SALPING-OOPHORECTOMY performed by Mariah Stevenson MD at [address]       Family History   Problem Relation Age of Onset    Heart Disease Mother         CHF    Cancer Father     COPD Father     Diabetes Father     High Blood Pressure Father     High Cholesterol Father      Social History     Tobacco Use    Smoking status: Former     Packs/day: 0.25     Years: 12.00     Pack years: 3.00     Types: Cigarettes     Quit date: 2000     Years since quittin.3    Smokeless tobacco: Never   Substance Use Topics    Alcohol use: Yes     Comment: rarely      Current Outpatient Medications   Medication Sig Dispense Refill    oxyCODONE-acetaminophen (PERCOCET) 5-325 MG per tablet Take 1-2 tablets by mouth every 4 hours as needed for Pain for up to 7 days.  50 tablet 0    PARoxetine (PAXIL) 40 MG tablet take 1 tablet by mouth once daily 90 tablet 2    OZEMPIC, 2 MG/DOSE, 8 MG/3ML SOPN inject 2 milligram subcutaneously ONCE A WEEK 9 mL 1    ondansetron (ZOFRAN) 4 MG tablet take 1 tablet by mouth once daily if needed for nausea and vomiting 30 tablet 0    ALPRAZolam (XANAX) 0.5 MG tablet take 1 tablet by mouth three times a day if needed for anxiety for sleep 90 tablet 2    pantoprazole (PROTONIX) 40 MG tablet Take 1 tablet by mouth every morning (before breakfast) 90 tablet 1    rOPINIRole (REQUIP) 0.5 MG tablet take 2 tablets by mouth at bedtime 180 tablet 3    mometasone (ELOCON) 0.1 % cream Apply topically daily. 45 g 2    cyclobenzaprine (FLEXERIL) 10 MG tablet Take 1 tablet by mouth three times a day if needed for muscle spasm 40 tablet 0    PARoxetine (PAXIL) 20 MG tablet Take 1 tablet by mouth in the morning. 90 tablet 3    oxyCODONE-acetaminophen (PERCOCET) 5-325 MG per tablet take 1 tablet by mouth every 6 hours if needed for pain      fluticasone (FLONASE) 50 MCG/ACT nasal spray instill 1 spray into each nostril once daily 48 g 3    Blood Glucose Monitoring Suppl (ACCU-CHEK GUIDE) w/Device KIT 1 each by Does not apply route daily 1 kit 0    blood glucose test strips (ACCU-CHEK GUIDE) strip 1 each by In Vitro route daily As needed. 100 each 3    promethazine (PHENERGAN) 25 MG tablet Take 1 tablet by mouth every 8 hours as needed for Nausea 60 tablet 0    diclofenac sodium (VOLTAREN) 1 % GEL Apply 2 g topically 4 times daily 150 g 5     No current facility-administered medications for this visit.      Allergies   Allergen Reactions    Sulfa Antibiotics Itching    Adhesive Tape Rash and Other (See Comments)     Blisters with prolonged use    Hydrocodone-Acetaminophen Nausea And Vomiting     Health Maintenance   Topic Date Due    Pneumococcal 0-64 years Vaccine (1 - PCV) Never done    HIV screen  Never done    Hepatitis C screen  Never done    Hepatitis B vaccine (1 of 3 - Risk 3-dose series) Never done    Diabetic Alb to Cr ratio (uACR) test  06/15/2018    Shingles vaccine (1 of 2) Never done    Diabetic retinal exam  04/23/2022    Diabetic foot exam  04/14/2023    GFR test (Diabetes, CKD 3-4, OR last GFR 15-59)  08/23/2023    A1C test (Diabetic or Prediabetic)  08/24/2023    Lipids  08/24/2023    Breast cancer screen  01/06/2024    Depression Screen  01/20/2024    Colorectal Cancer Screen  03/08/2026    DTaP/Tdap/Td vaccine (2 - Td or Tdap) 09/22/2030    Flu vaccine  Completed    COVID-19 Vaccine  Completed    Hepatitis A vaccine  Aged Out    Hib vaccine  Aged Out    Meningococcal (ACWY) vaccine  Aged Out         Objective:     Physical Exam  Vitals reviewed. Constitutional:       General: She is not in acute distress. Appearance: Normal appearance. She is well-developed. She is not diaphoretic. HENT:      Head: Normocephalic and atraumatic. Right Ear: External ear normal.      Left Ear: External ear normal.      Nose: Nose normal.      Mouth/Throat:      Pharynx: Oropharynx is clear. No oropharyngeal exudate. Eyes:      General: No scleral icterus. Right eye: No discharge. Left eye: No discharge. Conjunctiva/sclera: Conjunctivae normal.      Pupils: Pupils are equal, round, and reactive to light. Neck:      Thyroid: No thyromegaly. Vascular: No JVD. Comments: No bruits  Cardiovascular:      Rate and Rhythm: Normal rate and regular rhythm. Heart sounds: Normal heart sounds. No murmur heard. No friction rub. No gallop. Pulmonary:      Effort: Pulmonary effort is normal. No respiratory distress. Breath sounds: Normal breath sounds. No wheezing or rales. Abdominal:      General: Bowel sounds are normal. There is no distension. Palpations: Abdomen is soft. There is no mass. Tenderness: There is no abdominal tenderness. There is no guarding or rebound. Musculoskeletal:         General: No tenderness. Normal range of motion. Lymphadenopathy:      Cervical: No cervical adenopathy. Skin:     General: Skin is warm and dry. Coloration: Skin is not pale. Findings: No erythema or rash. Neurological:      Mental Status: She is alert and oriented to person, place, and time. Mental status is at baseline. Cranial Nerves: No cranial nerve deficit.       Coordination: Coordination normal.      Deep Tendon Reflexes: Reflexes normal.   Psychiatric:         Mood and Affect: Mood normal.     /64 (Site: Right Upper Arm)   Pulse (!) 107   Temp 97.9 °F (36.6 °C) (Oral)   Resp 16   Wt 176 lb 11.2 oz (80.2 kg)   LMP 11/17/2020 (Approximate) Comment: tubal ligation/ablation  SpO2 97%   BMI 31.05 kg/m²       Impression/Plan:  1. Type 2 diabetes mellitus without complication, without long-term current use of insulin ([facility] [address]    2. Periprosthetic supracondylar fracture of femur, subsequent encounter    3. Nausea    4. Constipation, unspecified constipation type      Requested Prescriptions     Signed Prescriptions Disp Refills    oxyCODONE-acetaminophen (PERCOCET) 5-325 MG per tablet 50 tablet 0     Sig: Take 1-2 tablets by mouth every 4 hours as needed for Pain for up to 7 days. Orders Placed This Encounter   Procedures    External Referral To Orthopedic Surgery     Referral Priority:   Routine     Referral Type:   Eval and Treat     Referral Reason:   Specialty Services Required     Requested Specialty:   Orthopedic Surgery     Number of Visits Requested:   1       Patient giveneducational materials - see patient instructions. Discussed use, benefit, and side effects of prescribed medications. All patient questions answered. Pt voiced understanding. Reviewed health maintenance. Patient agreedwith treatment plan. Follow up as directed. **This report has been created using voice recognition software. It may contain minor errorswhich are inherent in voice recognition technology. **       Electronically signed by Janeth Fabian MD on 1/22/2023 at 8:31 AM

## 2023-02-03 RX ORDER — ONDANSETRON 4 MG/1
TABLET, FILM COATED ORAL
Qty: 30 TABLET | Refills: 0 | Status: SHIPPED | OUTPATIENT
Start: 2023-02-03

## 2023-02-07 ENCOUNTER — TELEPHONE (OUTPATIENT)
Dept: FAMILY MEDICINE CLINIC | Age: 53
End: 2023-02-07

## 2023-02-07 NOTE — TELEPHONE ENCOUNTER
Has tried Tylenol/Ibuprofen/Excedrin Tension Headache and cannot get relief from headache. It is in the back of her head and neck. Please advise.      CPB

## 2023-02-08 ENCOUNTER — OFFICE VISIT (OUTPATIENT)
Dept: FAMILY MEDICINE CLINIC | Age: 53
End: 2023-02-08
Payer: COMMERCIAL

## 2023-02-08 VITALS
RESPIRATION RATE: 16 BRPM | HEART RATE: 66 BPM | SYSTOLIC BLOOD PRESSURE: 128 MMHG | DIASTOLIC BLOOD PRESSURE: 74 MMHG | BODY MASS INDEX: 31.28 KG/M2 | TEMPERATURE: 97.6 F | WEIGHT: 178 LBS

## 2023-02-08 DIAGNOSIS — G44.209 TENSION HEADACHE: ICD-10-CM

## 2023-02-08 DIAGNOSIS — C50.919 MALIGNANT NEOPLASM OF FEMALE BREAST, UNSPECIFIED ESTROGEN RECEPTOR STATUS, UNSPECIFIED LATERALITY, UNSPECIFIED SITE OF BREAST (HCC): ICD-10-CM

## 2023-02-08 DIAGNOSIS — E11.9 TYPE 2 DIABETES MELLITUS WITHOUT COMPLICATION, WITHOUT LONG-TERM CURRENT USE OF INSULIN (HCC): ICD-10-CM

## 2023-02-08 DIAGNOSIS — R09.81 SINUS CONGESTION: Primary | ICD-10-CM

## 2023-02-08 DIAGNOSIS — J34.0 CELLULITIS OF NOSE, EXTERNAL: ICD-10-CM

## 2023-02-08 DIAGNOSIS — H65.03 BILATERAL ACUTE SEROUS OTITIS MEDIA, RECURRENCE NOT SPECIFIED: ICD-10-CM

## 2023-02-08 LAB
Lab: NORMAL
QC PASS/FAIL: NORMAL
SARS-COV-2 RDRP RESP QL NAA+PROBE: NEGATIVE

## 2023-02-08 PROCEDURE — 99214 OFFICE O/P EST MOD 30 MIN: CPT | Performed by: NURSE PRACTITIONER

## 2023-02-08 PROCEDURE — 87635 SARS-COV-2 COVID-19 AMP PRB: CPT | Performed by: NURSE PRACTITIONER

## 2023-02-08 RX ORDER — BUTALBITAL, ACETAMINOPHEN AND CAFFEINE 50; 325; 40 MG/1; MG/1; MG/1
1 TABLET ORAL EVERY 4 HOURS PRN
Qty: 60 TABLET | Refills: 0 | Status: SHIPPED | OUTPATIENT
Start: 2023-02-08

## 2023-02-08 RX ORDER — CEFUROXIME AXETIL 250 MG/1
250 TABLET ORAL 2 TIMES DAILY
Qty: 20 TABLET | Refills: 0 | Status: SHIPPED | OUTPATIENT
Start: 2023-02-08 | End: 2023-02-18

## 2023-02-08 RX ORDER — LEVOCETIRIZINE DIHYDROCHLORIDE 5 MG/1
5 TABLET, FILM COATED ORAL NIGHTLY
Qty: 30 TABLET | Refills: 2 | Status: SHIPPED | OUTPATIENT
Start: 2023-02-08

## 2023-02-08 RX ORDER — PREDNISONE 10 MG/1
10 TABLET ORAL 2 TIMES DAILY
Qty: 10 TABLET | Refills: 0 | Status: SHIPPED | OUTPATIENT
Start: 2023-02-08 | End: 2023-02-13

## 2023-02-08 SDOH — ECONOMIC STABILITY: HOUSING INSECURITY
IN THE LAST 12 MONTHS, WAS THERE A TIME WHEN YOU DID NOT HAVE A STEADY PLACE TO SLEEP OR SLEPT IN A SHELTER (INCLUDING NOW)?: NO

## 2023-02-08 SDOH — ECONOMIC STABILITY: INCOME INSECURITY: HOW HARD IS IT FOR YOU TO PAY FOR THE VERY BASICS LIKE FOOD, HOUSING, MEDICAL CARE, AND HEATING?: NOT HARD AT ALL

## 2023-02-08 SDOH — ECONOMIC STABILITY: FOOD INSECURITY: WITHIN THE PAST 12 MONTHS, YOU WORRIED THAT YOUR FOOD WOULD RUN OUT BEFORE YOU GOT MONEY TO BUY MORE.: NEVER TRUE

## 2023-02-08 SDOH — ECONOMIC STABILITY: FOOD INSECURITY: WITHIN THE PAST 12 MONTHS, THE FOOD YOU BOUGHT JUST DIDN'T LAST AND YOU DIDN'T HAVE MONEY TO GET MORE.: NEVER TRUE

## 2023-02-09 ASSESSMENT — ENCOUNTER SYMPTOMS
CHEST TIGHTNESS: 0
SINUS PRESSURE: 1
ABDOMINAL PAIN: 0
COUGH: 0
SHORTNESS OF BREATH: 0
ABDOMINAL DISTENTION: 0
SINUS PAIN: 1
BACK PAIN: 0
WHEEZING: 0

## 2023-02-09 NOTE — PROGRESS NOTES
[address] [facility] [address]  Dept: [phone]  Dept Fax: [phone]  Loc: [phone]  PROGRESS NOTE      VisitDate: 2/8/2023    Mac Orellana is a 45 y.o. female who presents today for:     Chief Complaint   Patient presents with    Sinus Problem     Sinus HA-bad the last 2 days-back of the neck pain, switched to Allegra without relief, nausea with vomiting, denies fever, fatigue         Subjective:  Patient presents with complaint of sinus congestion pressure headache neck pain and stiffness, fatigue for the past couple days. Denies fever. Review of Systems   Constitutional:  Negative for activity change, appetite change, chills, fatigue and fever. HENT:  Positive for congestion, ear pain, sinus pressure and sinus pain. Eyes:  Negative for visual disturbance. Respiratory:  Negative for cough, chest tightness, shortness of breath and wheezing. Cardiovascular:  Negative for chest pain, palpitations and leg swelling. Gastrointestinal:  Negative for abdominal distention and abdominal pain. Genitourinary:  Negative for dysuria. Musculoskeletal:  Positive for arthralgias. Negative for back pain and neck pain. Skin: Negative. Negative for rash. Neurological:  Positive for headaches. Negative for dizziness and light-headedness. Hematological:  Negative for adenopathy. Psychiatric/Behavioral:  Negative for decreased concentration and dysphoric mood. The patient is nervous/anxious. All other systems reviewed and are negative.   Past Medical History:   Diagnosis Date    Arthritis     Breast cancer ([address]     H/O bladder infections     History of stomach ulcers     Knee cartilage, torn, left     PONV (postoperative nausea and vomiting)     Type II or unspecified type diabetes mellitus without mention of complication, not stated as uncontrolled       Past Surgical History:   Procedure Laterality Date COLONOSCOPY  2016    DILATION AND CURETTAGE OF UTERUS  2017    ENDOMETRIAL ABLATION      FEMUR FRACTURE SURGERY Right 2021    FEMUR OPEN REDUCTION INTERNAL FIXATION performed by Carol Hatch MD at [address]  2011    Dr Leonardo Alvarado, [address] (CERVIX REMOVED)  12/15/2020    JOINT REPLACEMENT  12    Right total knee      LYMPHADENECTOMY  10/02/2020    MASTECTOMY Left     SALPINGO-OOPHORECTOMY N/A 12/15/2020    ROBOTIC HYSYER WITH BILATERAL SALPING-OOPHORECTOMY performed by Albertina English MD at [address]       Family History   Problem Relation Age of Onset    Heart Disease Mother         CHF    Cancer Father     COPD Father     Diabetes Father     High Blood Pressure Father     High Cholesterol Father      Social History     Tobacco Use    Smoking status: Former     Packs/day: 0.25     Years: 12.00     Pack years: 3.00     Types: Cigarettes     Quit date: 2000     Years since quittin.4    Smokeless tobacco: Never   Substance Use Topics    Alcohol use: Yes     Comment: rarely      Current Outpatient Medications   Medication Sig Dispense Refill    butalbital-acetaminophen-caffeine (FIORICET, ESGIC) -40 MG per tablet Take 1 tablet by mouth every 4 hours as needed for Headaches 60 tablet 0    levocetirizine (XYZAL) 5 MG tablet Take 1 tablet by mouth nightly 30 tablet 2    predniSONE (DELTASONE) 10 MG tablet Take 1 tablet by mouth 2 times daily for 5 days 10 tablet 0    cefUROXime (CEFTIN) 250 MG tablet Take 1 tablet by mouth 2 times daily for 10 days 20 tablet 0    mupirocin (BACTROBAN) 2 % ointment Apply topically 3 times daily.  30 g 0    ondansetron (ZOFRAN) 4 MG tablet take 1 tablet by mouth once daily if needed for nausea and vomiting 30 tablet 0    PARoxetine (PAXIL) 40 MG tablet take 1 tablet by mouth once daily 90 tablet 2    OZEMPIC, 2 MG/DOSE, 8 MG/3ML SOPN inject 2 milligram subcutaneously ONCE A WEEK 9 mL 1    ALPRAZolam (XANAX) 0.5 MG tablet take 1 tablet by mouth three times a day if needed for anxiety for sleep 90 tablet 2    pantoprazole (PROTONIX) 40 MG tablet Take 1 tablet by mouth every morning (before breakfast) 90 tablet 1    rOPINIRole (REQUIP) 0.5 MG tablet take 2 tablets by mouth at bedtime 180 tablet 3    mometasone (ELOCON) 0.1 % cream Apply topically daily. 45 g 2    cyclobenzaprine (FLEXERIL) 10 MG tablet Take 1 tablet by mouth three times a day if needed for muscle spasm 40 tablet 0    PARoxetine (PAXIL) 20 MG tablet Take 1 tablet by mouth in the morning. 90 tablet 3    oxyCODONE-acetaminophen (PERCOCET) 5-325 MG per tablet take 1 tablet by mouth every 6 hours if needed for pain      fluticasone (FLONASE) 50 MCG/ACT nasal spray instill 1 spray into each nostril once daily 48 g 3    Blood Glucose Monitoring Suppl (ACCU-CHEK GUIDE) w/Device KIT 1 each by Does not apply route daily 1 kit 0    blood glucose test strips (ACCU-CHEK GUIDE) strip 1 each by In Vitro route daily As needed. 100 each 3    promethazine (PHENERGAN) 25 MG tablet Take 1 tablet by mouth every 8 hours as needed for Nausea 60 tablet 0    diclofenac sodium (VOLTAREN) 1 % GEL Apply 2 g topically 4 times daily 150 g 5     No current facility-administered medications for this visit.      Allergies   Allergen Reactions    Sulfa Antibiotics Itching    Adhesive Tape Rash and Other (See Comments)     Blisters with prolonged use    Hydrocodone-Acetaminophen Nausea And Vomiting     Health Maintenance   Topic Date Due    Pneumococcal 0-64 years Vaccine (1 - PCV) Never done    HIV screen  Never done    Hepatitis C screen  Never done    Hepatitis B vaccine (1 of 3 - Risk 3-dose series) Never done    Diabetic Alb to Cr ratio (uACR) test  06/15/2018    Shingles vaccine (1 of 2) Never done    Diabetic retinal exam  04/23/2022    Diabetic foot exam  04/14/2023    GFR test (Diabetes, CKD 3-4, OR last GFR 15-59)  08/23/2023 A1C test (Diabetic or Prediabetic)  08/24/2023    Lipids  08/24/2023    Breast cancer screen  01/06/2024    Depression Screen  01/20/2024    DTaP/Tdap/Td vaccine (2 - Td or Tdap) 09/22/2030    Colorectal Cancer Screen  01/03/2033    Flu vaccine  Completed    COVID-19 Vaccine  Completed    Hepatitis A vaccine  Aged Out    Hib vaccine  Aged Out    Meningococcal (ACWY) vaccine  Aged Out     Negative COVID    Objective:     Physical Exam  Vitals and nursing note reviewed. Constitutional:       Appearance: Normal appearance. She is well-developed and normal weight. HENT:      Head: Normocephalic. Right Ear: Ear canal normal. A middle ear effusion is present. Left Ear: Ear canal normal. A middle ear effusion is present. Nose: Nasal tenderness, mucosal edema and rhinorrhea present. Right Sinus: Maxillary sinus tenderness and frontal sinus tenderness present. Left Sinus: Maxillary sinus tenderness and frontal sinus tenderness present. Comments: Erythema tenderness noted to external nose left nostril     Mouth/Throat:      Pharynx: Oropharynx is clear. Eyes:      Extraocular Movements: Extraocular movements intact. Conjunctiva/sclera: Conjunctivae normal.   Cardiovascular:      Rate and Rhythm: Normal rate and regular rhythm. Pulses: Normal pulses. Heart sounds: Normal heart sounds. Pulmonary:      Effort: Pulmonary effort is normal.      Breath sounds: Normal breath sounds. Abdominal:      General: Bowel sounds are normal.      Palpations: Abdomen is soft. Musculoskeletal:         General: Normal range of motion. Cervical back: Neck supple. Skin:     General: Skin is warm and dry. Neurological:      General: No focal deficit present. Mental Status: She is alert and oriented to person, place, and time. Psychiatric:         Mood and Affect: Mood normal.         Thought Content:  Thought content normal.         Judgment: Judgment normal.     /74 (Site: Right Upper Arm)   Pulse 66   Temp 97.6 °F (36.4 °C) (Oral)   Resp 16   Wt 178 lb (80.7 kg)   LMP 11/17/2020 (Approximate) Comment: tubal ligation/ablation  BMI 31.28 kg/m²       Impression/Plan:  1. Sinus congestion    2. Bilateral acute serous otitis media, recurrence not specified    3. Type 2 diabetes mellitus without complication, without long-term current use of insulin ([facility]    4. Malignant neoplasm of female breast, unspecified estrogen receptor status, unspecified laterality, unspecified site of breast ([facility]    5. Tension headache    6. Cellulitis of nose, external      Requested Prescriptions     Signed Prescriptions Disp Refills    butalbital-acetaminophen-caffeine (FIORICET, ESGIC) -40 MG per tablet 60 tablet 0     Sig: Take 1 tablet by mouth every 4 hours as needed for Headaches    levocetirizine (XYZAL) 5 MG tablet 30 tablet 2     Sig: Take 1 tablet by mouth nightly    predniSONE (DELTASONE) 10 MG tablet 10 tablet 0     Sig: Take 1 tablet by mouth 2 times daily for 5 days    cefUROXime (CEFTIN) 250 MG tablet 20 tablet 0     Sig: Take 1 tablet by mouth 2 times daily for 10 days    mupirocin (BACTROBAN) 2 % ointment 30 g 0     Sig: Apply topically 3 times daily. Orders Placed This Encounter   Procedures    POCT COVID-19 Rapid, NAAT     Order Specific Question:   Pregnant? Answer:   No     Order Specific Question:   Previously tested for COVID-19? Answer:   Yes   Fioricet 1 every 4 as needed ditazole 5 mg nightly. Prednisone 10 mg twice daily 5 days. Ceftin 250 twice daily for 10 days. Bactroban ointment 3 times daily to nose. Continue medications as prescribed. Educational information on above diagnosis  provided per AVS.  Labs as previously ordered. 30 min  Patient giveneducational materials - see patient instructions. Discussed use, benefit, and side effects of prescribed medications. All patient questions answered. Pt voiced understanding.  Reviewed health maintenance. Patient agreedwith treatment plan. Follow up as directed.    30 min       Electronically signed by JERI Marroquin CNP on 2/9/2023 at 10:36 AM

## 2023-02-13 ENCOUNTER — PATIENT MESSAGE (OUTPATIENT)
Dept: FAMILY MEDICINE CLINIC | Age: 53
End: 2023-02-13

## 2023-02-13 DIAGNOSIS — G44.209 TENSION HEADACHE: Primary | ICD-10-CM

## 2023-02-13 NOTE — TELEPHONE ENCOUNTER
From: Mack Meek  To: Kade Gum  Sent: 2/13/2023 12:25 PM EST  Subject: Headache    Good morning Annye Abts, still having really bad headaches and not finding any relief with the medication that you gave me. Its really hard to take muscle relaxers in the middle of the day because I  kids from school. Is there anything else that you can think of that will help get rid of the headache? Its getting to the point where its all day every day and its starting to affect my day-to-day life. I am beyond frustrated.   Thanks,   Jerardo Mix

## 2023-03-02 RX ORDER — ONDANSETRON 4 MG/1
TABLET, FILM COATED ORAL
Qty: 30 TABLET | Refills: 0 | Status: SHIPPED | OUTPATIENT
Start: 2023-03-02

## 2023-03-02 RX ORDER — CYCLOBENZAPRINE HCL 10 MG
TABLET ORAL
Qty: 40 TABLET | Refills: 0 | Status: SHIPPED | OUTPATIENT
Start: 2023-03-02

## 2023-03-04 ENCOUNTER — HOSPITAL ENCOUNTER (OUTPATIENT)
Dept: CT IMAGING | Age: 53
Discharge: HOME | End: 2023-03-04
Payer: COMMERCIAL

## 2023-03-04 DIAGNOSIS — G44.209 TENSION HEADACHE: ICD-10-CM

## 2023-03-04 PROCEDURE — 70450 CT HEAD/BRAIN W/O DYE: CPT

## 2023-03-13 ENCOUNTER — TELEPHONE (OUTPATIENT)
Dept: FAMILY MEDICINE CLINIC | Age: 53
End: 2023-03-13

## 2023-03-13 RX ORDER — CEFUROXIME AXETIL 250 MG/1
250 TABLET ORAL 2 TIMES DAILY
Qty: 20 TABLET | Refills: 0 | Status: SHIPPED | OUTPATIENT
Start: 2023-03-13 | End: 2023-03-23

## 2023-03-13 RX ORDER — BUTALBITAL, ACETAMINOPHEN AND CAFFEINE 50; 325; 40 MG/1; MG/1; MG/1
1 TABLET ORAL EVERY 4 HOURS PRN
Qty: 60 TABLET | Refills: 0 | Status: CANCELLED | OUTPATIENT
Start: 2023-03-13

## 2023-03-13 RX ORDER — BENZONATATE 200 MG/1
200 CAPSULE ORAL 3 TIMES DAILY PRN
Qty: 30 CAPSULE | Refills: 0 | Status: SHIPPED | OUTPATIENT
Start: 2023-03-13 | End: 2023-03-20

## 2023-03-13 RX ORDER — PREDNISONE 10 MG/1
10 TABLET ORAL 2 TIMES DAILY
Qty: 10 TABLET | Refills: 0 | Status: SHIPPED | OUTPATIENT
Start: 2023-03-13 | End: 2023-03-18

## 2023-03-13 NOTE — TELEPHONE ENCOUNTER
HA-ongoing since CT scan, runny nose, dry cough, otalgia, fatigue, denies fever taking xyzal, hoping to have something called in rather than come in for appt    Neg home covid yesterday    Also req refill on her fioricet.      HILARIA tyrone  [facility]

## 2023-03-22 DIAGNOSIS — G44.209 TENSION-TYPE HEADACHE, NOT INTRACTABLE, UNSPECIFIED CHRONICITY PATTERN: Primary | ICD-10-CM

## 2023-03-23 RX ORDER — BUTALBITAL, ACETAMINOPHEN AND CAFFEINE 50; 325; 40 MG/1; MG/1; MG/1
1 TABLET ORAL EVERY 4 HOURS PRN
Qty: 60 TABLET | Refills: 2 | Status: SHIPPED | OUTPATIENT
Start: 2023-03-23

## 2023-03-30 RX ORDER — ONDANSETRON 4 MG/1
TABLET, FILM COATED ORAL
Qty: 30 TABLET | Refills: 0 | Status: SHIPPED | OUTPATIENT
Start: 2023-03-30

## 2023-04-18 RX ORDER — PANTOPRAZOLE SODIUM 40 MG/1
TABLET, DELAYED RELEASE ORAL
Qty: 90 TABLET | Refills: 3 | Status: SHIPPED | OUTPATIENT
Start: 2023-04-18

## 2023-05-02 RX ORDER — ONDANSETRON 4 MG/1
TABLET, FILM COATED ORAL
Qty: 30 TABLET | Refills: 0 | Status: SHIPPED | OUTPATIENT
Start: 2023-05-02

## 2023-05-10 ENCOUNTER — OFFICE VISIT (OUTPATIENT)
Dept: FAMILY MEDICINE CLINIC | Age: 53
End: 2023-05-10
Payer: COMMERCIAL

## 2023-05-10 VITALS
TEMPERATURE: 98.4 F | WEIGHT: 184.8 LBS | DIASTOLIC BLOOD PRESSURE: 84 MMHG | HEIGHT: 63 IN | SYSTOLIC BLOOD PRESSURE: 122 MMHG | RESPIRATION RATE: 16 BRPM | BODY MASS INDEX: 32.74 KG/M2

## 2023-05-10 DIAGNOSIS — K59.00 CONSTIPATION, UNSPECIFIED CONSTIPATION TYPE: ICD-10-CM

## 2023-05-10 DIAGNOSIS — M54.2 NECK PAIN: ICD-10-CM

## 2023-05-10 DIAGNOSIS — C50.912 INFILTRATING DUCTAL CARCINOMA OF LEFT BREAST (HCC): ICD-10-CM

## 2023-05-10 DIAGNOSIS — M43.6 TORTICOLLIS: ICD-10-CM

## 2023-05-10 DIAGNOSIS — M62.838 NECK MUSCLE SPASM: ICD-10-CM

## 2023-05-10 DIAGNOSIS — E11.9 TYPE 2 DIABETES MELLITUS WITHOUT COMPLICATION, WITHOUT LONG-TERM CURRENT USE OF INSULIN (HCC): Primary | ICD-10-CM

## 2023-05-10 LAB — HBA1C MFR BLD: 5.1 %

## 2023-05-10 PROCEDURE — 3044F HG A1C LEVEL LT 7.0%: CPT | Performed by: NURSE PRACTITIONER

## 2023-05-10 PROCEDURE — 99214 OFFICE O/P EST MOD 30 MIN: CPT | Performed by: NURSE PRACTITIONER

## 2023-05-10 PROCEDURE — 96372 THER/PROPH/DIAG INJ SC/IM: CPT | Performed by: NURSE PRACTITIONER

## 2023-05-10 PROCEDURE — 83036 HEMOGLOBIN GLYCOSYLATED A1C: CPT | Performed by: NURSE PRACTITIONER

## 2023-05-10 RX ORDER — LIDOCAINE 50 MG/G
2 PATCH TOPICAL DAILY
Qty: 60 PATCH | Refills: 0 | Status: SHIPPED | OUTPATIENT
Start: 2023-05-10 | End: 2023-06-09

## 2023-05-10 RX ORDER — PREDNISONE 1 MG/1
TABLET ORAL
Qty: 30 TABLET | Refills: 0 | Status: SHIPPED | OUTPATIENT
Start: 2023-05-10 | End: 2023-05-20

## 2023-05-10 RX ORDER — MELOXICAM 15 MG/1
15 TABLET ORAL DAILY
COMMUNITY
Start: 2023-04-22

## 2023-05-10 RX ORDER — METHYLPREDNISOLONE ACETATE 80 MG/ML
120 INJECTION, SUSPENSION INTRA-ARTICULAR; INTRALESIONAL; INTRAMUSCULAR; SOFT TISSUE ONCE
Status: COMPLETED | OUTPATIENT
Start: 2023-05-10 | End: 2023-05-10

## 2023-05-10 RX ADMIN — METHYLPREDNISOLONE ACETATE 120 MG: 80 INJECTION, SUSPENSION INTRA-ARTICULAR; INTRALESIONAL; INTRAMUSCULAR; SOFT TISSUE at 11:56

## 2023-05-11 ASSESSMENT — ENCOUNTER SYMPTOMS
COUGH: 0
ABDOMINAL DISTENTION: 0
ABDOMINAL PAIN: 0
CHEST TIGHTNESS: 0
BACK PAIN: 0
WHEEZING: 0
SHORTNESS OF BREATH: 0

## 2023-05-11 NOTE — PROGRESS NOTES
Administrations This Visit       methylPREDNISolone acetate (DEPO-MEDROL) injection 120 mg       Admin Date  05/10/2023  11:56 Action  Given Dose  120 mg Route  IntraMUSCular Site  Ventrogluteal Right Administered By  Efrain Singh CMA ([address] [place] [address])    Ordering Provider: JERI Osborne CNP    NDC: 3327-2678-33    Lot#: [id number]    : Farheen Thurman.     Patient Supplied?: No
(Oral)   Resp 16   Ht 5' 3\" (1.6 m)   Wt 184 lb 12.8 oz (83.8 kg)   LMP 2020 (Approximate) Comment: tubal ligation/ablation  BMI 32.74 kg/m²     A1c in office today 5.1. Impression/Plan:  1. Type 2 diabetes mellitus without complication, without long-term current use of insulin ([facility]    2. Torticollis    3. Neck muscle spasm    4. Neck pain    5. Infiltrating ductal carcinoma of left breast ([facility]    6. Constipation, unspecified constipation type      Requested Prescriptions     Signed Prescriptions Disp Refills    lidocaine (LIDODERM) 5 % 60 patch 0     Sig: Place 2 patches onto the skin daily 12 hours on, 12 hours off.    predniSONE (DELTASONE) 5 MG tablet 30 tablet 0     Si po qd for 3 days, then 3 po qd for 3 days, then 2 po qd for 3 days, then 1 po qd for 3 days     Orders Placed This Encounter   Procedures    POCT glycosylated hemoglobin (Hb A1C)   Depo-Medrol 120 IM office today prednisone taper prescribed Lidoderm patches 2 patches 12 hours on 12 hours off. Continue medications as prescribed. Educational information on above diagnosis  provided per AVS.  30 min    Patient giveneducational materials - see patient instructions. Discussed use, benefit, and side effects of prescribed medications. All patient questions answered. Pt voiced understanding. Reviewed health maintenance. Patient agreedwith treatment plan. Follow up as directed.           Electronically signed by JERI Payton CNP on 2023 at 10:58 AM

## 2023-05-23 RX ORDER — LEVOCETIRIZINE DIHYDROCHLORIDE 5 MG/1
5 TABLET, FILM COATED ORAL NIGHTLY
Qty: 90 TABLET | Refills: 3 | Status: SHIPPED | OUTPATIENT
Start: 2023-05-23

## 2023-05-30 RX ORDER — ONDANSETRON 4 MG/1
TABLET, FILM COATED ORAL
Qty: 30 TABLET | Refills: 0 | Status: SHIPPED | OUTPATIENT
Start: 2023-05-30

## 2023-06-20 ENCOUNTER — PATIENT MESSAGE (OUTPATIENT)
Dept: FAMILY MEDICINE CLINIC | Age: 53
End: 2023-06-20

## 2023-06-20 DIAGNOSIS — G47.00 INSOMNIA, UNSPECIFIED TYPE: ICD-10-CM

## 2023-06-21 RX ORDER — ALPRAZOLAM 0.5 MG/1
TABLET ORAL
Qty: 90 TABLET | Refills: 2 | Status: SHIPPED | OUTPATIENT
Start: 2023-06-21 | End: 2023-09-19

## 2023-06-21 NOTE — TELEPHONE ENCOUNTER
From: Luis Police  To: Marcharnel Britt  Sent: 6/20/2023 7:27 PM EDT  Subject: Refill    Hello, I tried to refill my Xanax and it is not on my medication list for some reason.    Thanks,   Marva Short

## 2023-07-07 RX ORDER — ONDANSETRON 4 MG/1
TABLET, FILM COATED ORAL
Qty: 30 TABLET | Refills: 0 | Status: SHIPPED | OUTPATIENT
Start: 2023-07-07

## 2023-07-19 RX ORDER — PAROXETINE HYDROCHLORIDE 20 MG/1
TABLET, FILM COATED ORAL
Qty: 90 TABLET | Refills: 2 | Status: SHIPPED | OUTPATIENT
Start: 2023-07-19

## 2023-08-07 RX ORDER — ONDANSETRON 4 MG/1
TABLET, FILM COATED ORAL
Qty: 30 TABLET | Refills: 0 | Status: SHIPPED | OUTPATIENT
Start: 2023-08-07

## 2023-09-05 RX ORDER — ONDANSETRON 4 MG/1
TABLET, FILM COATED ORAL
Qty: 30 TABLET | Refills: 0 | Status: SHIPPED | OUTPATIENT
Start: 2023-09-05

## 2023-09-06 ENCOUNTER — TELEPHONE (OUTPATIENT)
Dept: FAMILY MEDICINE CLINIC | Age: 53
End: 2023-09-06

## 2023-09-06 NOTE — TELEPHONE ENCOUNTER
Pt has an upcoming appt on 9/14 for DM and would like labs prior. She also c/o fatigue. She will go to [facility] later this week or Monday. She is aware to fast 12 hrs, no need to call pt. Please advise.

## 2023-09-07 NOTE — TELEPHONE ENCOUNTER
CBC CMP FLP TSH free T4 A1c.   Diagnosis type 2 diabetes with hyperglycemia, hyperlipidemia hypothyroid breast cancer

## 2023-09-07 NOTE — TELEPHONE ENCOUNTER
Pt has orders in epic that I did not see were not completed from 10/21/22 that are still good. No orders placed.

## 2023-09-13 ENCOUNTER — NURSE ONLY (OUTPATIENT)
Dept: LAB | Age: 53
End: 2023-09-13

## 2023-09-13 DIAGNOSIS — Z13.220 SCREENING CHOLESTEROL LEVEL: ICD-10-CM

## 2023-09-13 DIAGNOSIS — C50.919 MALIGNANT NEOPLASM OF FEMALE BREAST, UNSPECIFIED ESTROGEN RECEPTOR STATUS, UNSPECIFIED LATERALITY, UNSPECIFIED SITE OF BREAST (HCC): ICD-10-CM

## 2023-09-13 DIAGNOSIS — E11.9 TYPE 2 DIABETES MELLITUS WITHOUT COMPLICATION, WITHOUT LONG-TERM CURRENT USE OF INSULIN (HCC): ICD-10-CM

## 2023-09-13 DIAGNOSIS — E66.01 CLASS 3 SEVERE OBESITY WITH SERIOUS COMORBIDITY AND BODY MASS INDEX (BMI) OF 40.0 TO 44.9 IN ADULT, UNSPECIFIED OBESITY TYPE (HCC): ICD-10-CM

## 2023-09-13 LAB
ALBUMIN SERPL BCG-MCNC: 4.2 G/DL (ref 3.5–5.1)
ALP SERPL-CCNC: 74 U/L (ref 38–126)
ALT SERPL W/O P-5'-P-CCNC: 8 U/L (ref 11–66)
ANION GAP SERPL CALC-SCNC: 12 MEQ/L (ref 8–16)
AST SERPL-CCNC: 15 U/L (ref 5–40)
BASOPHILS ABSOLUTE: 0.1 THOU/MM3 (ref 0–0.1)
BASOPHILS NFR BLD AUTO: 0.9 %
BILIRUB SERPL-MCNC: 0.7 MG/DL (ref 0.3–1.2)
BUN SERPL-MCNC: 15 MG/DL (ref 7–22)
CALCIUM SERPL-MCNC: 9.8 MG/DL (ref 8.5–10.5)
CHLORIDE SERPL-SCNC: 106 MEQ/L (ref 98–111)
CHOLEST SERPL-MCNC: 183 MG/DL (ref 100–199)
CO2 SERPL-SCNC: 28 MEQ/L (ref 23–33)
CREAT SERPL-MCNC: 0.6 MG/DL (ref 0.4–1.2)
DEPRECATED MEAN GLUCOSE BLD GHB EST-ACNC: 93 MG/DL (ref 70–126)
DEPRECATED RDW RBC AUTO: 43 FL (ref 35–45)
EOSINOPHIL NFR BLD AUTO: 4.7 %
EOSINOPHILS ABSOLUTE: 0.3 THOU/MM3 (ref 0–0.4)
ERYTHROCYTE [DISTWIDTH] IN BLOOD BY AUTOMATED COUNT: 12.7 % (ref 11.5–14.5)
GFR SERPL CREATININE-BSD FRML MDRD: > 60 ML/MIN/1.73M2
GLUCOSE SERPL-MCNC: 104 MG/DL (ref 70–108)
HBA1C MFR BLD HPLC: 5.1 % (ref 4.4–6.4)
HCT VFR BLD AUTO: 50.1 % (ref 37–47)
HDLC SERPL-MCNC: 50 MG/DL
HGB BLD-MCNC: 16.7 GM/DL (ref 12–16)
IMM GRANULOCYTES # BLD AUTO: 0.03 THOU/MM3 (ref 0–0.07)
IMM GRANULOCYTES NFR BLD AUTO: 0.4 %
LDLC SERPL CALC-MCNC: 98 MG/DL
LYMPHOCYTES ABSOLUTE: 1.9 THOU/MM3 (ref 1–4.8)
LYMPHOCYTES NFR BLD AUTO: 25.7 %
MCH RBC QN AUTO: 30.8 PG (ref 26–33)
MCHC RBC AUTO-ENTMCNC: 33.3 GM/DL (ref 32.2–35.5)
MCV RBC AUTO: 92.4 FL (ref 81–99)
MONOCYTES ABSOLUTE: 0.4 THOU/MM3 (ref 0.4–1.3)
MONOCYTES NFR BLD AUTO: 4.9 %
NEUTROPHILS NFR BLD AUTO: 63.4 %
NRBC BLD AUTO-RTO: 0 /100 WBC
PLATELET # BLD AUTO: 480 THOU/MM3 (ref 130–400)
PMV BLD AUTO: 10.8 FL (ref 9.4–12.4)
POTASSIUM SERPL-SCNC: 5.5 MEQ/L (ref 3.5–5.2)
PROT SERPL-MCNC: 6.7 G/DL (ref 6.1–8)
RBC # BLD AUTO: 5.42 MILL/MM3 (ref 4.2–5.4)
SEGMENTED NEUTROPHILS ABSOLUTE COUNT: 4.7 THOU/MM3 (ref 1.8–7.7)
SODIUM SERPL-SCNC: 146 MEQ/L (ref 135–145)
T4 FREE SERPL-MCNC: 1.26 NG/DL (ref 0.93–1.76)
TRIGL SERPL-MCNC: 175 MG/DL (ref 0–199)
TSH SERPL DL<=0.005 MIU/L-ACNC: 0.54 UIU/ML (ref 0.4–4.2)
WBC # BLD AUTO: 7.4 THOU/MM3 (ref 4.8–10.8)

## 2023-09-14 ENCOUNTER — OFFICE VISIT (OUTPATIENT)
Dept: FAMILY MEDICINE CLINIC | Age: 53
End: 2023-09-14
Payer: COMMERCIAL

## 2023-09-14 VITALS
WEIGHT: 181 LBS | RESPIRATION RATE: 16 BRPM | SYSTOLIC BLOOD PRESSURE: 112 MMHG | HEART RATE: 102 BPM | OXYGEN SATURATION: 96 % | TEMPERATURE: 98.2 F | DIASTOLIC BLOOD PRESSURE: 80 MMHG | BODY MASS INDEX: 32.06 KG/M2

## 2023-09-14 DIAGNOSIS — D75.839 THROMBOCYTOSIS: Primary | ICD-10-CM

## 2023-09-14 DIAGNOSIS — E11.9 TYPE 2 DIABETES MELLITUS WITHOUT COMPLICATION, WITHOUT LONG-TERM CURRENT USE OF INSULIN (HCC): ICD-10-CM

## 2023-09-14 DIAGNOSIS — Z23 NEED FOR INFLUENZA VACCINATION: ICD-10-CM

## 2023-09-14 DIAGNOSIS — E87.5 HYPERKALEMIA: ICD-10-CM

## 2023-09-14 DIAGNOSIS — D58.2 ELEVATED HEMOGLOBIN (HCC): ICD-10-CM

## 2023-09-14 DIAGNOSIS — F41.9 ANXIETY: ICD-10-CM

## 2023-09-14 DIAGNOSIS — D75.839 THROMBOCYTHEMIA: ICD-10-CM

## 2023-09-14 DIAGNOSIS — G44.209 TENSION HEADACHE: ICD-10-CM

## 2023-09-14 DIAGNOSIS — C50.912 INFILTRATING DUCTAL CARCINOMA OF LEFT BREAST (HCC): ICD-10-CM

## 2023-09-14 DIAGNOSIS — K21.9 GASTROESOPHAGEAL REFLUX DISEASE, UNSPECIFIED WHETHER ESOPHAGITIS PRESENT: ICD-10-CM

## 2023-09-14 DIAGNOSIS — R71.8 ELEVATED HEMATOCRIT: ICD-10-CM

## 2023-09-14 PROCEDURE — 3044F HG A1C LEVEL LT 7.0%: CPT | Performed by: NURSE PRACTITIONER

## 2023-09-14 PROCEDURE — 99213 OFFICE O/P EST LOW 20 MIN: CPT | Performed by: NURSE PRACTITIONER

## 2023-09-14 PROCEDURE — 90471 IMMUNIZATION ADMIN: CPT | Performed by: NURSE PRACTITIONER

## 2023-09-14 PROCEDURE — 90674 CCIIV4 VAC NO PRSV 0.5 ML IM: CPT | Performed by: NURSE PRACTITIONER

## 2023-09-14 RX ORDER — BLOOD SUGAR DIAGNOSTIC
1 STRIP MISCELLANEOUS DAILY
Qty: 100 EACH | Refills: 3 | Status: SHIPPED | OUTPATIENT
Start: 2023-09-14

## 2023-09-14 RX ORDER — FLUTICASONE PROPIONATE 50 MCG
1 SPRAY, SUSPENSION (ML) NASAL DAILY
Qty: 48 G | Refills: 3 | Status: SHIPPED | OUTPATIENT
Start: 2023-09-14

## 2023-09-14 ASSESSMENT — ENCOUNTER SYMPTOMS
ABDOMINAL PAIN: 0
BACK PAIN: 0
CHEST TIGHTNESS: 0
ABDOMINAL DISTENTION: 0
COUGH: 0
SHORTNESS OF BREATH: 0
WHEEZING: 0

## 2023-09-14 NOTE — PROGRESS NOTES
Immunizations Administered       Name Date Dose Route    Influenza, FLUCELVAX, (age 10 mo+), MDCK, PF, 0.5mL 9/14/2023 0.5 mL Intramuscular    Site: Deltoid- Right    Lot: [id number]    NDC: 56732-536-11
RDW-SD      35.0 - 45.0 fL 43.0    Platelet Count      211 - 400 thou/mm3 480 (H)    MPV      9.4 - 12.4 fL 10.8    Seg Neutrophils      % 63.4    Lymphocytes      % 25.7    Monocytes      % 4.9    Eosinophils      % 4.7    Basophils      % 0.9    Immature Granulocytes      % 0.4    Segs Absolute      1.8 - 7.7 thou/mm3 4.7    Lymphocytes Absolute      1.0 - 4.8 thou/mm3 1.9    Monocytes Absolute      0.4 - 1.3 thou/mm3 0.4    Eosinophils Absolute      0.0 - 0.4 thou/mm3 0.3    Basophils Absolute      0.0 - 0.1 thou/mm3 0.1    Immature Grans (Abs)      0.00 - 0.07 thou/mm3 0.03    Nucleated Red Blood Cells      /100 wbc 0    Glucose, Random      70 - 108 mg/dL 104    Creatinine      0.4 - 1.2 mg/dL 0.6    BUN,BUNPL      7 - 22 mg/dL 15    Sodium      135 - 145 meq/L 146 (H)    Potassium      3.5 - 5.2 meq/L 5.5 (H)    Chloride      98 - 111 meq/L 106    CO2      23 - 33 meq/L 28    CALCIUM, SERUM, 254691      8.5 - 10.5 mg/dL 9.8    AST      5 - 40 U/L 15    Alk Phos      38 - 126 U/L 74    Total Protein      6.1 - 8.0 g/dL 6.7    Albumin      3.5 - 5.1 g/dL 4.2    BILIRUBIN TOTAL      0.3 - 1.2 mg/dL 0.7    ALT      11 - 66 U/L 8 (L)    Cholesterol, Total      100 - 199 mg/dL 183    Triglycerides      0 - 199 mg/dL 175    HDL Cholesterol      mg/dL 50    LDL Calculated      mg/dL 98    Hemoglobin A1C      4.4 - 6.4 % 5.1    AVERAGE GLUCOSE      70 - 126 mg/dL 93    TSH      0.400 - 4.200 uIU/mL 0.537    T4 Free      0.93 - 1.76 ng/dL 1.26    Anion Gap      8.0 - 16.0 meq/L 12.0    Est, Glom Filt Rate      >60 ml/min/1.73m2 >60       Legend:  (H) High  (L) Low  Impression/Plan:  1. Thrombocytosis    2. Need for influenza vaccination    3. Type 2 diabetes mellitus without complication, without long-term current use of insulin ([address])    4. Infiltrating ductal carcinoma of left breast ([address])    5. Tension headache    6. Hyperkalemia    7. Thrombocythemia    8. Elevated hemoglobin (HCC)    9. Elevated hematocrit    10.

## 2023-10-02 ENCOUNTER — TELEPHONE (OUTPATIENT)
Dept: FAMILY MEDICINE CLINIC | Age: 53
End: 2023-10-02

## 2023-10-02 RX ORDER — CEFUROXIME AXETIL 250 MG/1
250 TABLET ORAL 2 TIMES DAILY
Qty: 20 TABLET | Refills: 0 | Status: SHIPPED | OUTPATIENT
Start: 2023-10-02 | End: 2023-10-12

## 2023-10-02 RX ORDER — BENZONATATE 200 MG/1
200 CAPSULE ORAL 3 TIMES DAILY PRN
Qty: 30 CAPSULE | Refills: 0 | Status: SHIPPED | OUTPATIENT
Start: 2023-10-02 | End: 2023-10-09

## 2023-10-02 NOTE — TELEPHONE ENCOUNTER
Has sneezing, coughing, stuffy head, runny nose and it is going in to her chest. Asking if you can just send something in for her? She states she was just here.        RA-Elm  [facility]

## 2023-10-06 RX ORDER — ONDANSETRON 4 MG/1
TABLET, FILM COATED ORAL
Qty: 30 TABLET | Refills: 0 | Status: SHIPPED | OUTPATIENT
Start: 2023-10-06

## 2023-10-17 DIAGNOSIS — G25.81 RESTLESS LEG SYNDROME: ICD-10-CM

## 2023-10-17 RX ORDER — ROPINIROLE 0.5 MG/1
TABLET, FILM COATED ORAL
Qty: 180 TABLET | Refills: 3 | Status: SHIPPED | OUTPATIENT
Start: 2023-10-17

## 2023-10-17 RX ORDER — PAROXETINE HYDROCHLORIDE 40 MG/1
TABLET, FILM COATED ORAL
Qty: 90 TABLET | Refills: 2 | Status: SHIPPED | OUTPATIENT
Start: 2023-10-17

## 2023-11-01 ENCOUNTER — OFFICE VISIT (OUTPATIENT)
Dept: FAMILY MEDICINE CLINIC | Age: 53
End: 2023-11-01
Payer: COMMERCIAL

## 2023-11-01 VITALS
HEART RATE: 102 BPM | WEIGHT: 177 LBS | OXYGEN SATURATION: 100 % | RESPIRATION RATE: 20 BRPM | SYSTOLIC BLOOD PRESSURE: 122 MMHG | TEMPERATURE: 97.5 F | DIASTOLIC BLOOD PRESSURE: 86 MMHG | BODY MASS INDEX: 31.35 KG/M2

## 2023-11-01 DIAGNOSIS — J01.90 ACUTE SINUSITIS, RECURRENCE NOT SPECIFIED, UNSPECIFIED LOCATION: Primary | ICD-10-CM

## 2023-11-01 DIAGNOSIS — L40.4 GUTTATE PSORIASIS: ICD-10-CM

## 2023-11-01 DIAGNOSIS — R09.81 NASAL CONGESTION: ICD-10-CM

## 2023-11-01 DIAGNOSIS — R05.1 ACUTE COUGH: ICD-10-CM

## 2023-11-01 LAB
Lab: NORMAL
QC PASS/FAIL: NORMAL
SARS-COV-2 RDRP RESP QL NAA+PROBE: NEGATIVE

## 2023-11-01 PROCEDURE — 87635 SARS-COV-2 COVID-19 AMP PRB: CPT | Performed by: FAMILY MEDICINE

## 2023-11-01 PROCEDURE — 99213 OFFICE O/P EST LOW 20 MIN: CPT | Performed by: FAMILY MEDICINE

## 2023-11-01 RX ORDER — PANTOPRAZOLE SODIUM 40 MG/1
40 TABLET, DELAYED RELEASE ORAL EVERY MORNING
Qty: 90 TABLET | Refills: 3 | Status: SHIPPED | OUTPATIENT
Start: 2023-11-01

## 2023-11-01 RX ORDER — MOMETASONE FUROATE 1 MG/G
CREAM TOPICAL
Qty: 45 G | Refills: 2 | Status: SHIPPED | OUTPATIENT
Start: 2023-11-01

## 2023-11-01 RX ORDER — AZITHROMYCIN 500 MG/1
500 TABLET, FILM COATED ORAL DAILY
Qty: 3 TABLET | Refills: 0 | Status: SHIPPED | OUTPATIENT
Start: 2023-11-01 | End: 2023-11-04

## 2023-11-05 ASSESSMENT — ENCOUNTER SYMPTOMS
ABDOMINAL PAIN: 0
SINUS PAIN: 1
RHINORRHEA: 1
NAUSEA: 0
SINUS PRESSURE: 1
VOMITING: 0
CHEST TIGHTNESS: 0
DIARRHEA: 0
BACK PAIN: 0
SHORTNESS OF BREATH: 0
COUGH: 1
EYES NEGATIVE: 1
SORE THROAT: 1

## 2023-11-08 ENCOUNTER — TELEPHONE (OUTPATIENT)
Dept: FAMILY MEDICINE CLINIC | Age: 53
End: 2023-11-08

## 2023-11-08 NOTE — TELEPHONE ENCOUNTER
----- Message from Eliceo Monsivaiso sent at 11/8/2023  2:08 PM EST -----  Subject: Message to Provider    QUESTIONS  Information for Provider? Dr. Briseida Engle office called about the referral pcp   sent over. their office says pt missed appt. last month. pcp recently sent   a referral that was incomplete and they will need a complete copy. DR. Ramond Dance fax number [phone]  ---------------------------------------------------------------------------  --------------  Cece Hardin  [phone]; Do not leave any message, patient will call back for answer  ---------------------------------------------------------------------------  --------------  SCRIPT ANSWERS  Relationship to Patient? Covered Entity  Covered Entity Type? Physician Office?   Representative Name? Alex Aguero

## 2023-11-28 ENCOUNTER — OFFICE VISIT (OUTPATIENT)
Dept: FAMILY MEDICINE CLINIC | Age: 53
End: 2023-11-28
Payer: COMMERCIAL

## 2023-11-28 VITALS
RESPIRATION RATE: 16 BRPM | DIASTOLIC BLOOD PRESSURE: 70 MMHG | HEART RATE: 119 BPM | BODY MASS INDEX: 31.89 KG/M2 | SYSTOLIC BLOOD PRESSURE: 122 MMHG | TEMPERATURE: 98.4 F | OXYGEN SATURATION: 97 % | WEIGHT: 180 LBS

## 2023-11-28 DIAGNOSIS — M97.8XXD PERIPROSTHETIC SUPRACONDYLAR FRACTURE OF FEMUR, SUBSEQUENT ENCOUNTER: ICD-10-CM

## 2023-11-28 DIAGNOSIS — G47.00 INSOMNIA, UNSPECIFIED TYPE: ICD-10-CM

## 2023-11-28 DIAGNOSIS — E11.9 TYPE 2 DIABETES MELLITUS WITHOUT COMPLICATION, WITHOUT LONG-TERM CURRENT USE OF INSULIN (HCC): Chronic | ICD-10-CM

## 2023-11-28 DIAGNOSIS — Z96.659 PERIPROSTHETIC SUPRACONDYLAR FRACTURE OF FEMUR, SUBSEQUENT ENCOUNTER: ICD-10-CM

## 2023-11-28 DIAGNOSIS — U07.1 COVID-19: Primary | ICD-10-CM

## 2023-11-28 DIAGNOSIS — R05.9 COUGH, UNSPECIFIED TYPE: ICD-10-CM

## 2023-11-28 LAB
Lab: ABNORMAL
QC PASS/FAIL: ABNORMAL
SARS-COV-2 RDRP RESP QL NAA+PROBE: POSITIVE

## 2023-11-28 PROCEDURE — 99214 OFFICE O/P EST MOD 30 MIN: CPT | Performed by: NURSE PRACTITIONER

## 2023-11-28 PROCEDURE — 87635 SARS-COV-2 COVID-19 AMP PRB: CPT | Performed by: NURSE PRACTITIONER

## 2023-11-28 PROCEDURE — 3044F HG A1C LEVEL LT 7.0%: CPT | Performed by: NURSE PRACTITIONER

## 2023-11-28 RX ORDER — OXYCODONE HYDROCHLORIDE AND ACETAMINOPHEN 5; 325 MG/1; MG/1
1-2 TABLET ORAL EVERY 4 HOURS PRN
Qty: 50 TABLET | Refills: 0 | Status: SHIPPED | OUTPATIENT
Start: 2023-11-28 | End: 2023-12-05

## 2023-11-28 RX ORDER — CODEINE PHOSPHATE/GUAIFENESIN 10-100MG/5
5 LIQUID (ML) ORAL 2 TIMES DAILY PRN
Qty: 70 ML | Refills: 0 | Status: SHIPPED | OUTPATIENT
Start: 2023-11-28 | End: 2023-12-05

## 2023-11-28 RX ORDER — OXYCODONE HYDROCHLORIDE AND ACETAMINOPHEN 5; 325 MG/1; MG/1
1 TABLET ORAL EVERY 6 HOURS PRN
Status: CANCELLED | OUTPATIENT
Start: 2023-11-28

## 2023-11-28 RX ORDER — PREDNISONE 20 MG/1
20 TABLET ORAL 2 TIMES DAILY
Qty: 10 TABLET | Refills: 0 | Status: SHIPPED | OUTPATIENT
Start: 2023-11-28 | End: 2023-12-03

## 2023-11-28 RX ORDER — MELOXICAM 7.5 MG/1
7.5 TABLET ORAL 2 TIMES DAILY
COMMUNITY
Start: 2023-09-22

## 2023-11-28 RX ORDER — ALPRAZOLAM 0.5 MG/1
TABLET ORAL
Qty: 90 TABLET | Refills: 2 | Status: CANCELLED | OUTPATIENT
Start: 2023-11-28 | End: 2024-02-26

## 2023-11-28 RX ORDER — ALPRAZOLAM 0.5 MG/1
TABLET ORAL
Qty: 90 TABLET | Refills: 2 | Status: SHIPPED | OUTPATIENT
Start: 2023-11-28 | End: 2024-02-26

## 2023-11-28 ASSESSMENT — ENCOUNTER SYMPTOMS
HEARTBURN: 0
CONSTIPATION: 0
COUGH: 1
HEMOPTYSIS: 0
DIARRHEA: 0
SHORTNESS OF BREATH: 0
VOMITING: 0
EYE PAIN: 0
NAUSEA: 0
WHEEZING: 0
TROUBLE SWALLOWING: 0
RHINORRHEA: 1
BACK PAIN: 0
ABDOMINAL PAIN: 0
EYE REDNESS: 0
SORE THROAT: 1
ALLERGIC/IMMUNOLOGIC NEGATIVE: 1
EYE DISCHARGE: 0

## 2023-11-28 NOTE — PROGRESS NOTES
tablet; Take 1 tablet by mouth 2 times daily for 5 days        Return if symptoms worsen or fail to improve. Patient instructions given andreviewed.         Electronically signed by Guillermo Cogan, APRN - CNP on 11/28/2023 at 3:55 PM

## 2023-12-08 RX ORDER — SEMAGLUTIDE 2.68 MG/ML
INJECTION, SOLUTION SUBCUTANEOUS
Qty: 9 ML | Refills: 1 | Status: SHIPPED | OUTPATIENT
Start: 2023-12-08

## 2024-03-15 RX ORDER — CYCLOBENZAPRINE HCL 10 MG
10 TABLET ORAL 3 TIMES DAILY PRN
Qty: 40 TABLET | Refills: 0 | Status: SHIPPED | OUTPATIENT
Start: 2024-03-15

## 2024-04-15 ENCOUNTER — TELEPHONE (OUTPATIENT)
Dept: FAMILY MEDICINE CLINIC | Age: 54
End: 2024-04-15

## 2024-04-15 DIAGNOSIS — G25.81 RESTLESS LEG SYNDROME: ICD-10-CM

## 2024-04-15 NOTE — TELEPHONE ENCOUNTER
Pt states she takes Ropinirole 0.5mg 2 tablets nightly and it is not helping. She is requesting to see if the dose can be increased.  Please advise.     States she had a plate and screws removed from her leg 3/14/24 at [facility] and is non weight bearing, next week she will be 50% weight bearing.     Seen acute visit 11/1/23    [facility]  HILARIA Dominguez  Appt 4/25/24

## 2024-04-16 RX ORDER — PAROXETINE HYDROCHLORIDE 20 MG/1
20 TABLET, FILM COATED ORAL EVERY MORNING
Qty: 90 TABLET | Refills: 3 | Status: SHIPPED | OUTPATIENT
Start: 2024-04-16

## 2024-04-16 RX ORDER — ROPINIROLE 2 MG/1
2 TABLET, FILM COATED ORAL NIGHTLY
Qty: 30 TABLET | Refills: 2 | Status: SHIPPED | OUTPATIENT
Start: 2024-04-16

## 2024-04-16 NOTE — TELEPHONE ENCOUNTER
Left a message on pt's machine informing her Ropinirole was increased to 2mg and a rx was sent this morning to HILARIA Dominguez.

## 2024-04-26 ENCOUNTER — OFFICE VISIT (OUTPATIENT)
Dept: FAMILY MEDICINE CLINIC | Age: 54
End: 2024-04-26
Payer: COMMERCIAL

## 2024-04-26 VITALS
SYSTOLIC BLOOD PRESSURE: 92 MMHG | BODY MASS INDEX: 32.95 KG/M2 | WEIGHT: 186 LBS | DIASTOLIC BLOOD PRESSURE: 74 MMHG | HEART RATE: 88 BPM | RESPIRATION RATE: 18 BRPM

## 2024-04-26 DIAGNOSIS — G25.81 RESTLESS LEG SYNDROME: ICD-10-CM

## 2024-04-26 DIAGNOSIS — G44.209 TENSION-TYPE HEADACHE, NOT INTRACTABLE, UNSPECIFIED CHRONICITY PATTERN: ICD-10-CM

## 2024-04-26 DIAGNOSIS — K21.9 GASTROESOPHAGEAL REFLUX DISEASE, UNSPECIFIED WHETHER ESOPHAGITIS PRESENT: ICD-10-CM

## 2024-04-26 DIAGNOSIS — R30.0 DYSURIA: ICD-10-CM

## 2024-04-26 DIAGNOSIS — N39.0 URINARY TRACT INFECTION WITHOUT HEMATURIA, SITE UNSPECIFIED: ICD-10-CM

## 2024-04-26 DIAGNOSIS — F41.9 ANXIETY: ICD-10-CM

## 2024-04-26 DIAGNOSIS — C50.912 INFILTRATING DUCTAL CARCINOMA OF LEFT BREAST (HCC): ICD-10-CM

## 2024-04-26 DIAGNOSIS — E11.9 TYPE 2 DIABETES MELLITUS WITHOUT COMPLICATION, WITHOUT LONG-TERM CURRENT USE OF INSULIN (HCC): ICD-10-CM

## 2024-04-26 DIAGNOSIS — G47.00 INSOMNIA, UNSPECIFIED TYPE: ICD-10-CM

## 2024-04-26 DIAGNOSIS — G44.209 TENSION HEADACHE: Primary | ICD-10-CM

## 2024-04-26 DIAGNOSIS — D75.839 THROMBOCYTHEMIA: ICD-10-CM

## 2024-04-26 LAB
BILIRUBIN, POC: ABNORMAL
BLOOD URINE, POC: ABNORMAL
CLARITY, POC: ABNORMAL
COLOR, POC: YELLOW
GLUCOSE URINE, POC: ABNORMAL
KETONES, POC: ABNORMAL
LEUKOCYTE EST, POC: ABNORMAL
NITRITE, POC: POSITIVE
PH, POC: 5.5
PROTEIN, POC: ABNORMAL
SPECIFIC GRAVITY, POC: 1.02
UROBILINOGEN, POC: 0.2

## 2024-04-26 PROCEDURE — 99214 OFFICE O/P EST MOD 30 MIN: CPT | Performed by: NURSE PRACTITIONER

## 2024-04-26 PROCEDURE — 81003 URINALYSIS AUTO W/O SCOPE: CPT | Performed by: NURSE PRACTITIONER

## 2024-04-26 RX ORDER — NITROFURANTOIN 25; 75 MG/1; MG/1
100 CAPSULE ORAL 2 TIMES DAILY
Qty: 20 CAPSULE | Refills: 0 | Status: SHIPPED | OUTPATIENT
Start: 2024-04-26 | End: 2024-05-06

## 2024-04-26 RX ORDER — BUTALBITAL, ACETAMINOPHEN AND CAFFEINE 50; 325; 40 MG/1; MG/1; MG/1
1 TABLET ORAL EVERY 4 HOURS PRN
Qty: 60 TABLET | Refills: 2 | Status: SHIPPED | OUTPATIENT
Start: 2024-04-26

## 2024-04-26 RX ORDER — TIRZEPATIDE 10 MG/.5ML
10 INJECTION, SOLUTION SUBCUTANEOUS WEEKLY
Qty: 4 ADJUSTABLE DOSE PRE-FILLED PEN SYRINGE | Refills: 1 | Status: SHIPPED | OUTPATIENT
Start: 2024-04-26

## 2024-04-26 RX ORDER — BUSPIRONE HYDROCHLORIDE 7.5 MG/1
7.5 TABLET ORAL 2 TIMES DAILY
Qty: 60 TABLET | Refills: 2 | Status: SHIPPED | OUTPATIENT
Start: 2024-04-26 | End: 2024-07-25

## 2024-04-26 RX ORDER — VITAMIN B COMPLEX
3000 TABLET ORAL DAILY
COMMUNITY

## 2024-04-26 SDOH — ECONOMIC STABILITY: INCOME INSECURITY: HOW HARD IS IT FOR YOU TO PAY FOR THE VERY BASICS LIKE FOOD, HOUSING, MEDICAL CARE, AND HEATING?: NOT HARD AT ALL

## 2024-04-26 SDOH — ECONOMIC STABILITY: FOOD INSECURITY: WITHIN THE PAST 12 MONTHS, YOU WORRIED THAT YOUR FOOD WOULD RUN OUT BEFORE YOU GOT MONEY TO BUY MORE.: NEVER TRUE

## 2024-04-26 SDOH — ECONOMIC STABILITY: FOOD INSECURITY: WITHIN THE PAST 12 MONTHS, THE FOOD YOU BOUGHT JUST DIDN'T LAST AND YOU DIDN'T HAVE MONEY TO GET MORE.: NEVER TRUE

## 2024-04-26 ASSESSMENT — PATIENT HEALTH QUESTIONNAIRE - PHQ9
SUM OF ALL RESPONSES TO PHQ9 QUESTIONS 1 & 2: 0
SUM OF ALL RESPONSES TO PHQ QUESTIONS 1-9: 0
2. FEELING DOWN, DEPRESSED OR HOPELESS: NOT AT ALL
SUM OF ALL RESPONSES TO PHQ QUESTIONS 1-9: 0
1. LITTLE INTEREST OR PLEASURE IN DOING THINGS: NOT AT ALL
SUM OF ALL RESPONSES TO PHQ QUESTIONS 1-9: 0
SUM OF ALL RESPONSES TO PHQ QUESTIONS 1-9: 0

## 2024-04-26 ASSESSMENT — ENCOUNTER SYMPTOMS
SHORTNESS OF BREATH: 0
WHEEZING: 0
ABDOMINAL DISTENTION: 0
CHEST TIGHTNESS: 0
COUGH: 0
ABDOMINAL PAIN: 0
BACK PAIN: 0

## 2024-04-26 NOTE — PROGRESS NOTES
health maintenance. Patient agreedwith treatment plan. Follow up as directed.   30 min       Electronically signed by JERI OLIVARES CNP on 4/26/2024 at 12:09 PM

## 2024-04-28 LAB
BACTERIA UR CULT: ABNORMAL
ORGANISM: ABNORMAL

## 2024-04-29 ENCOUNTER — TELEPHONE (OUTPATIENT)
Dept: FAMILY MEDICINE CLINIC | Age: 54
End: 2024-04-29

## 2024-04-29 RX ORDER — GABAPENTIN 100 MG/1
100 CAPSULE ORAL 2 TIMES DAILY
Qty: 60 CAPSULE | Refills: 1 | Status: SHIPPED | OUTPATIENT
Start: 2024-04-29 | End: 2024-06-28

## 2024-04-29 NOTE — TELEPHONE ENCOUNTER
Patient called stating she was seen last Friday; she and Colin discussed her neuropathy that wasn't bothering her at the time, over the weekend had issues with pain in hands and arms. Requesting gabapentin for this

## 2024-05-08 ENCOUNTER — TELEPHONE (OUTPATIENT)
Dept: FAMILY MEDICINE CLINIC | Age: 54
End: 2024-05-08

## 2024-05-08 NOTE — TELEPHONE ENCOUNTER
Patient states she was seen for UTI on 4/29; states it almost got worse while she was on antibiotic. She took home UTI test which showed up positive; patient believes she may need another round of macrobid or anything for relief.

## 2024-05-09 ENCOUNTER — PATIENT MESSAGE (OUTPATIENT)
Dept: FAMILY MEDICINE CLINIC | Age: 54
End: 2024-05-09

## 2024-05-09 RX ORDER — PHENAZOPYRIDINE HYDROCHLORIDE 100 MG/1
100 TABLET, FILM COATED ORAL 3 TIMES DAILY PRN
Qty: 20 TABLET | Refills: 0 | Status: SHIPPED | OUTPATIENT
Start: 2024-05-09 | End: 2025-05-09

## 2024-05-09 RX ORDER — CEPHALEXIN 500 MG/1
500 CAPSULE ORAL 4 TIMES DAILY
Qty: 40 CAPSULE | Refills: 0 | Status: SHIPPED | OUTPATIENT
Start: 2024-05-09 | End: 2024-05-19

## 2024-05-10 RX ORDER — FLUCONAZOLE 100 MG/1
100 TABLET ORAL DAILY
Qty: 7 TABLET | Refills: 0 | Status: SHIPPED | OUTPATIENT
Start: 2024-05-10 | End: 2024-05-17

## 2024-05-10 NOTE — TELEPHONE ENCOUNTER
From: Marly Cha  To: Colin Leija  Sent: 5/9/2024 8:35 PM EDT  Subject: Diflucan     I think I am going to need some diflucan…I’m on my 3rd antibiotic since March 20. I was on one after surgery for 10 days.   Thank you,   Juliette Cha

## 2024-05-13 DIAGNOSIS — G62.9 NEUROPATHY: Primary | ICD-10-CM

## 2024-05-13 RX ORDER — PREGABALIN 75 MG/1
75 CAPSULE ORAL 2 TIMES DAILY
Qty: 60 CAPSULE | Refills: 2 | Status: SHIPPED | OUTPATIENT
Start: 2024-05-13 | End: 2024-08-11

## 2024-05-15 ENCOUNTER — HOSPITAL ENCOUNTER (EMERGENCY)
Age: 54
Discharge: HOME | End: 2024-05-15
Attending: EMERGENCY MEDICINE
Payer: COMMERCIAL

## 2024-05-15 ENCOUNTER — APPOINTMENT (OUTPATIENT)
Dept: CT IMAGING | Age: 54
End: 2024-05-15
Payer: COMMERCIAL

## 2024-05-15 VITALS
SYSTOLIC BLOOD PRESSURE: 125 MMHG | RESPIRATION RATE: 15 BRPM | DIASTOLIC BLOOD PRESSURE: 75 MMHG | OXYGEN SATURATION: 94 % | HEART RATE: 99 BPM | TEMPERATURE: 98 F

## 2024-05-15 DIAGNOSIS — R39.89 SENSATION OF PRESSURE IN BLADDER AREA: Primary | ICD-10-CM

## 2024-05-15 LAB
ALBUMIN SERPL BCG-MCNC: 3.9 G/DL (ref 3.5–5.1)
ALP SERPL-CCNC: 78 U/L (ref 38–126)
ALT SERPL W/O P-5'-P-CCNC: 11 U/L (ref 11–66)
ANION GAP SERPL CALC-SCNC: 12 MEQ/L (ref 8–16)
AST SERPL-CCNC: 17 U/L (ref 5–40)
BACTERIA URNS QL MICRO: ABNORMAL /HPF
BASOPHILS ABSOLUTE: 0.1 THOU/MM3 (ref 0–0.1)
BASOPHILS NFR BLD AUTO: 1.1 %
BILIRUB SERPL-MCNC: 0.3 MG/DL (ref 0.3–1.2)
BILIRUB UR QL STRIP.AUTO: NEGATIVE
BUN SERPL-MCNC: 14 MG/DL (ref 7–22)
CALCIUM SERPL-MCNC: 9.2 MG/DL (ref 8.5–10.5)
CASTS #/AREA URNS LPF: ABNORMAL /LPF
CASTS 2: ABNORMAL /LPF
CHARACTER UR: CLEAR
CHLORIDE SERPL-SCNC: 103 MEQ/L (ref 98–111)
CO2 SERPL-SCNC: 26 MEQ/L (ref 23–33)
COLOR: ABNORMAL
CREAT SERPL-MCNC: 0.6 MG/DL (ref 0.4–1.2)
CRYSTALS URNS MICRO: ABNORMAL
DEPRECATED RDW RBC AUTO: 42.4 FL (ref 35–45)
EOSINOPHIL NFR BLD AUTO: 2.5 %
EOSINOPHILS ABSOLUTE: 0.2 THOU/MM3 (ref 0–0.4)
EPITHELIAL CELLS, UA: ABNORMAL /HPF
ERYTHROCYTE [DISTWIDTH] IN BLOOD BY AUTOMATED COUNT: 12.7 % (ref 11.5–14.5)
GFR SERPL CREATININE-BSD FRML MDRD: > 90 ML/MIN/1.73M2
GLUCOSE SERPL-MCNC: 68 MG/DL (ref 70–108)
GLUCOSE UR QL STRIP.AUTO: NEGATIVE MG/DL
HCT VFR BLD AUTO: 46.9 % (ref 37–47)
HGB BLD-MCNC: 15.5 GM/DL (ref 12–16)
HGB UR QL STRIP.AUTO: NEGATIVE
IMM GRANULOCYTES # BLD AUTO: 0.03 THOU/MM3 (ref 0–0.07)
IMM GRANULOCYTES NFR BLD AUTO: 0.4 %
KETONES UR QL STRIP.AUTO: NEGATIVE
LACTATE SERPL-SCNC: 1.6 MMOL/L (ref 0.5–2)
LACTIC ACID, SEPSIS: 2.6 MMOL/L (ref 0.5–1.9)
LYMPHOCYTES ABSOLUTE: 1.5 THOU/MM3 (ref 1–4.8)
LYMPHOCYTES NFR BLD AUTO: 19.7 %
MCH RBC QN AUTO: 30.4 PG (ref 26–33)
MCHC RBC AUTO-ENTMCNC: 33 GM/DL (ref 32.2–35.5)
MCV RBC AUTO: 92 FL (ref 81–99)
MISCELLANEOUS 2: ABNORMAL
MONOCYTES ABSOLUTE: 0.4 THOU/MM3 (ref 0.4–1.3)
MONOCYTES NFR BLD AUTO: 4.7 %
NEUTROPHILS ABSOLUTE: 5.4 THOU/MM3 (ref 1.8–7.7)
NEUTROPHILS NFR BLD AUTO: 71.6 %
NITRITE UR QL STRIP: POSITIVE
NRBC BLD AUTO-RTO: 0 /100 WBC
OSMOLALITY SERPL CALC.SUM OF ELEC: 280 MOSMOL/KG (ref 275–300)
PH UR STRIP.AUTO: 6 [PH] (ref 5–9)
PLATELET # BLD AUTO: 537 THOU/MM3 (ref 130–400)
PMV BLD AUTO: 10.1 FL (ref 9.4–12.4)
POTASSIUM SERPL-SCNC: 3.7 MEQ/L (ref 3.5–5.2)
PROT SERPL-MCNC: 6.6 G/DL (ref 6.1–8)
PROT UR STRIP.AUTO-MCNC: NEGATIVE MG/DL
RBC # BLD AUTO: 5.1 MILL/MM3 (ref 4.2–5.4)
RBC URINE: ABNORMAL /HPF
RENAL EPI CELLS #/AREA URNS HPF: ABNORMAL /[HPF]
SODIUM SERPL-SCNC: 141 MEQ/L (ref 135–145)
SP GR UR REFRACT.AUTO: 1.01 (ref 1–1.03)
TSH SERPL DL<=0.005 MIU/L-ACNC: 0.44 UIU/ML (ref 0.4–4.2)
UROBILINOGEN, URINE: 1 EU/DL (ref 0–1)
WBC # BLD AUTO: 7.5 THOU/MM3 (ref 4.8–10.8)
WBC #/AREA URNS HPF: ABNORMAL /HPF
WBC #/AREA URNS HPF: ABNORMAL /[HPF]
YEAST LIKE FUNGI URNS QL MICRO: ABNORMAL

## 2024-05-15 PROCEDURE — 83605 ASSAY OF LACTIC ACID: CPT

## 2024-05-15 PROCEDURE — 36415 COLL VENOUS BLD VENIPUNCTURE: CPT

## 2024-05-15 PROCEDURE — 87040 BLOOD CULTURE FOR BACTERIA: CPT

## 2024-05-15 PROCEDURE — 87147 CULTURE TYPE IMMUNOLOGIC: CPT

## 2024-05-15 PROCEDURE — 84443 ASSAY THYROID STIM HORMONE: CPT

## 2024-05-15 PROCEDURE — 74177 CT ABD & PELVIS W/CONTRAST: CPT

## 2024-05-15 PROCEDURE — 6360000004 HC RX CONTRAST MEDICATION: Performed by: EMERGENCY MEDICINE

## 2024-05-15 PROCEDURE — 99285 EMERGENCY DEPT VISIT HI MDM: CPT

## 2024-05-15 PROCEDURE — 85025 COMPLETE CBC W/AUTO DIFF WBC: CPT

## 2024-05-15 PROCEDURE — 81001 URINALYSIS AUTO W/SCOPE: CPT

## 2024-05-15 PROCEDURE — 96360 HYDRATION IV INFUSION INIT: CPT

## 2024-05-15 PROCEDURE — 80053 COMPREHEN METABOLIC PANEL: CPT

## 2024-05-15 PROCEDURE — 2580000003 HC RX 258: Performed by: EMERGENCY MEDICINE

## 2024-05-15 PROCEDURE — 87801 DETECT AGNT MULT DNA AMPLI: CPT

## 2024-05-15 PROCEDURE — 6370000000 HC RX 637 (ALT 250 FOR IP): Performed by: EMERGENCY MEDICINE

## 2024-05-15 RX ORDER — 0.9 % SODIUM CHLORIDE 0.9 %
1000 INTRAVENOUS SOLUTION INTRAVENOUS ONCE
Status: COMPLETED | OUTPATIENT
Start: 2024-05-15 | End: 2024-05-15

## 2024-05-15 RX ORDER — CEPHALEXIN 250 MG/1
500 CAPSULE ORAL ONCE
Status: COMPLETED | OUTPATIENT
Start: 2024-05-15 | End: 2024-05-15

## 2024-05-15 RX ADMIN — SODIUM CHLORIDE 1000 ML: 9 INJECTION, SOLUTION INTRAVENOUS at 18:12

## 2024-05-15 RX ADMIN — CEPHALEXIN 500 MG: 250 CAPSULE ORAL at 18:06

## 2024-05-15 RX ADMIN — IOPAMIDOL 80 ML: 755 INJECTION, SOLUTION INTRAVENOUS at 19:07

## 2024-05-15 ASSESSMENT — PAIN SCALES - GENERAL
PAINLEVEL_OUTOF10: 3
PAINLEVEL_OUTOF10: 3

## 2024-05-15 ASSESSMENT — PAIN - FUNCTIONAL ASSESSMENT: PAIN_FUNCTIONAL_ASSESSMENT: 0-10

## 2024-05-15 NOTE — ED TRIAGE NOTES
Pt arrives to ED from home with c/o possible UTI, pt reports having orthopedic surgery in March, had ortiz placed during procedure, pt states she has since had dysuria, frequency  and urgency   Pt reports being treated a few times in past 7 weeks for UTI's

## 2024-05-15 NOTE — ED PROVIDER NOTES
PATIENT NAME: Marly Cha  MRN: [id number]  : 1970  CORTEZ: 5/15/2024    I performed a history and physical examination of the patient and discussed management with the Resident. I reviewed the Resident's note and agree with the documented findings and plan of care. Any areas of disagreement are noted on the chart. I was personally present for the key portions of any procedures and have documented in the chart those procedures where I was not present during the key portions. I have reviewed the emergency nurses triage note and agree with the chief complaint, past medical history, past surgical history, allergies, medications, social and family history as documented unless otherwise noted below.    MEDICAL DEDISION MAKING (MDM)     Marly Cha is a 53 y.o. female who presents to Emergency Department with Dysuria     Intermittent dysuria since 2024 after Trevino catheter insertion during her right femur surgery.  She is currently on cephalexin for UTI.  No fever or chills.  Physical exam: AVSS.  Heart and lungs unremarkable.  Soft abdomen with SP tenderness.  No CVA tenderness.  ED workups are reassuring.  No clear etiology of patient's cystitis.  Recommend urology follow-up for cystoscopy.  Urology referral information is provided.      Vitals:    05/15/24 1622 05/15/24 1624   BP:  125/75   Pulse:  78   Resp:  19   Temp: 98 °F (36.7 °C)    TempSrc: Oral    SpO2:  100%     Labs Reviewed   URINE WITH REFLEXED MICRO - Abnormal; Notable for the following components:       Result Value    Nitrite, Urine POSITIVE (*)     Leukocyte Esterase, Urine TRACE (*)     Color, UA DK YELLOW (*)     All other components within normal limits   CBC WITH AUTO DIFFERENTIAL - Abnormal; Notable for the following components:    Platelets 537 (*)     All other components within normal limits   COMPREHENSIVE METABOLIC PANEL - Abnormal; Notable for the following components:    Glucose 68 (*)     All other components within

## 2024-05-16 NOTE — ED PROVIDER NOTES
[facility]  EMERGENCY DEPARTMENT ENCOUNTER          Pt Name: Marly Cha  MRN: [id number]  Birthdate 1970  Date of evaluation: 5/15/2024  ED Resident: Isabelle Sanford MD  ED Attending: Dr. Costa    CHIEF COMPLAINT       Chief Complaint   Patient presents with    Dysuria     History obtained from the patient.    HISTORY OF PRESENT ILLNESS    HPI  Marly Cha is a 53 y.o. female who presents to the emergency department for evaluation of dysuria.    Patient states that she has been experiencing a pressure-like pain in her bladder since having femur surgery, on chart review since hardware removal of patient's right knee with IT band decompression 3/14/2024.  Patient thinks that having a Trevino placed at that time may have caused her infection.  She has been on antibiotics twice, but she is still experiencing this pressure pain.  She has not had any fevers, no hematuria, 1 episode of nausea with vomiting yesterday.    however, the patient thinks that the patient has no other acute complaints at this time.    PAST MEDICAL AND SURGICAL HISTORY     Past Medical History:   Diagnosis Date    Arthritis     Breast cancer (HCC)     H/O bladder infections     History of stomach ulcers     Knee cartilage, torn, left     PONV (postoperative nausea and vomiting)     Type II or unspecified type diabetes mellitus without mention of complication, not stated as uncontrolled      Past Surgical History:   Procedure Laterality Date    COLONOSCOPY  2016    DILATION AND CURETTAGE OF UTERUS  04/17/2017    ENDOMETRIAL ABLATION      FEMUR FRACTURE SURGERY Right 11/7/2021    FEMUR OPEN REDUCTION INTERNAL FIXATION performed by Joselo Manuel MD at [facility]    [facility]    HYSTERECTOMY, TOTAL ABDOMINAL (CERVIX REMOVED)  12/15/2020    JOINT REPLACEMENT  5/30/12    Right total knee      LYMPHADENECTOMY  10/02/2020    MASTECTOMY

## 2024-05-16 NOTE — DISCHARGE INSTRUCTIONS
You were seen at [facility] emergency department for a pressure-like pain in your bladder.  In the ER, while there were nitrites in your urine, there were no bacteria seen, which means you most likely are adequately treating your urinary tract infection.  The pressure-like pain you are feeling may be due to inflammation that occurred after having a Trevino catheter placed for surgery.  This should improve with time, however, you should be seen by a urologist. Please call our urology office to schedule an appointment.  Please also be sure to follow-up with your primary doctor in the next 2 weeks.

## 2024-05-17 LAB
ACB COMPLEX DNA BLD POS QL NAA+NON-PROBE: NOT DETECTED
B FRAGILIS DNA BLD POS QL NAA+NON-PROBE: NOT DETECTED
BACTERIA BLD AEROBE CULT: NORMAL
BLACTX-M ISLT/SPM QL: ABNORMAL
BLAIMP ISLT/SPM QL: ABNORMAL
BLAKPC ISLT/SPM QL: ABNORMAL
BLAOXA-48-LIKE ISLT/SPM QL: ABNORMAL
BLAVIM ISLT/SPM QL: ABNORMAL
BOTTLE TYPE: ABNORMAL
C ALBICANS DNA BLD POS QL NAA+NON-PROBE: NOT DETECTED
C AURIS DNA BLD POS QL NAA+NON-PROBE: NOT DETECTED
C GATTII+NEOFOR DNA BLD POS QL NAA+N-PRB: NOT DETECTED
C GLABRATA DNA BLD POS QL NAA+NON-PROBE: NOT DETECTED
C KRUSEI DNA BLD POS QL NAA+NON-PROBE: NOT DETECTED
C PARAP DNA BLD POS QL NAA+NON-PROBE: NOT DETECTED
C TROPICLS DNA BLD POS QL NAA+NON-PROBE: NOT DETECTED
COAG NEG STAPH DNA BLD QL NAA+PROBE: DETECTED
COLISTIN RES MCR-1 ISLT/SPM QL: ABNORMAL
E CLOAC COMP DNA BLD POS NAA+NON-PROBE: NOT DETECTED
E COLI DNA BLD POS QL NAA+NON-PROBE: NOT DETECTED
E FAECALIS DNA BLD POS QL NAA+NON-PROBE: NOT DETECTED
E FAECIUM DNA BLD POS QL NAA+NON-PROBE: NOT DETECTED
ENTEROBACTERALES DNA BLD POS NAA+N-PRB: NOT DETECTED
GP B STREP DNA SPEC QL NAA+PROBE: NOT DETECTED
GP B STREP DNA SPEC QL NAA+PROBE: NOT DETECTED
HAEM INFLU DNA BLD POS QL NAA+NON-PROBE: NOT DETECTED
K OXYTOCA DNA BLD POS QL NAA+NON-PROBE: NOT DETECTED
K OXYTOCA DNA BLD POS QL NAA+NON-PROBE: NOT DETECTED
KLEBSIELLA SP DNA BLD POS QL NAA+NON-PRB: NOT DETECTED
L MONOCYTOG DNA BLD POS QL NAA+NON-PROBE: NOT DETECTED
MECA ISLT/SPM QL: ABNORMAL
MECA+MECC+MREJ ISLT/SPM QL: ABNORMAL
N MEN DNA BLD POS QL NAA+NON-PROBE: NOT DETECTED
NDM: ABNORMAL
P AERUGINOSA DNA BLD POS NAA+NON-PROBE: NOT DETECTED
PROTEUS SPP: NOT DETECTED
S AUREUS DNA BLD POS QL NAA+NON-PROBE: NOT DETECTED
S EPIDERMIDIS DNA BLD POS QL NAA+NON-PRB: NOT DETECTED
S LUGDUNENSIS DNA BLD POS QL NAA+NON-PRB: NOT DETECTED
S MALTOPHILIA DNA BLD POS QL NAA+NON-PRB: NOT DETECTED
S MARCESCENS DNA BLD POS NAA+NON-PROBE: NOT DETECTED
S PYO DNA THROAT QL NAA+PROBE: NOT DETECTED
SALMONELLA DNA BLD POS QL NAA+NON-PROBE: NOT DETECTED
SOURCE OF BLOOD CULTURE: ABNORMAL
STREPTOCOCCUS DNA BLD QL NAA+PROBE: NOT DETECTED
VANA+VANB ISLT/SPM QL: ABNORMAL

## 2024-05-18 LAB
BACTERIA BLD AEROBE CULT: ABNORMAL
BACTERIA BLD AEROBE CULT: ABNORMAL
ORGANISM: ABNORMAL

## 2024-05-20 LAB — BACTERIA BLD AEROBE CULT: NORMAL

## 2024-05-22 ENCOUNTER — OFFICE VISIT (OUTPATIENT)
Dept: UROLOGY | Age: 54
End: 2024-05-22
Payer: COMMERCIAL

## 2024-05-22 VITALS — BODY MASS INDEX: 33.66 KG/M2 | HEIGHT: 63 IN | WEIGHT: 190 LBS | TEMPERATURE: 98.3 F

## 2024-05-22 DIAGNOSIS — R30.0 DYSURIA: Primary | ICD-10-CM

## 2024-05-22 LAB
BILIRUBIN, URINE: ABNORMAL
BLOOD URINE, POC: ABNORMAL
CHARACTER, URINE: CLEAR
COLOR: YELLOW
GLUCOSE URINE: NEGATIVE MG/DL
KETONES, URINE: NEGATIVE
LEUKOCYTE CLUMPS, URINE: ABNORMAL
NITRITE, URINE: NEGATIVE
PH, URINE: 5.5 (ref 5–9)
PROTEIN, URINE: 30 MG/DL
SPECIFIC GRAVITY UA: >= 1.03 (ref 1–1.03)
UROBILINOGEN, URINE: 0.2 EU/DL (ref 0–1)

## 2024-05-22 PROCEDURE — 81003 URINALYSIS AUTO W/O SCOPE: CPT

## 2024-05-22 PROCEDURE — 99204 OFFICE O/P NEW MOD 45 MIN: CPT

## 2024-05-22 RX ORDER — DOXYCYCLINE HYCLATE 100 MG
100 TABLET ORAL 2 TIMES DAILY
Qty: 20 TABLET | Refills: 0 | Status: SHIPPED | OUTPATIENT
Start: 2024-05-22 | End: 2024-06-01

## 2024-05-22 RX ORDER — CIPROFLOXACIN 500 MG/1
500 TABLET, FILM COATED ORAL 2 TIMES DAILY
Qty: 20 TABLET | Refills: 0 | Status: SHIPPED | OUTPATIENT
Start: 2024-05-22 | End: 2024-05-22

## 2024-05-22 NOTE — PATIENT INSTRUCTIONS
Continue probiotics, D-Mannose, Cranberry  I will send your urine for additional studies and call with abnormal results.   Consume 60 oz of fluid daily  Start Doxycyline   Take over the stool softeners to avoid constipation   Follow-up in 12 weeks   Call with questions, comments, or concerns. I recommend going to the ED for further evaluation if you develop fever, chills, nausea, vomiting, chest pain, SOB, or calf pain.

## 2024-05-22 NOTE — PROGRESS NOTES
[facility] [place] [facility]  [facility] UROLOGY  [address]  [address]  [facility] [address]  Dept: [phone]  Loc: [phone]    Visit Date: 5/22/2024        HPI:     HPI  Ms. Cha is a 53-year-old female that presents as a new patient.    Patient was seen and evaluated in the ED on 5/15/2024 for dysuria. She endorsed pressure-like pain in the bladder ongoing since her prior femur procedure. She underwent hardware removal of patient's right knee with IT band decompression 3/14/2024. Trevino catheter was placed as part of the procedure. Notes that she has been treated with both Macrobid and keflex with no improvement in symptomatology. CT imaging obtained and remarkable for a non-obstructive 2 mm stone in the R lower pole of the kidney.    Medical hx remarkable for breast cancer.     Current Outpatient Medications   Medication Sig Dispense Refill    Probiotic Product (PROBIOTIC DAILY PO) Take 1 capsule by mouth daily Women's with urinary      doxycycline hyclate (VIBRA-TABS) 100 MG tablet Take 1 tablet by mouth 2 times daily for 10 days 20 tablet 0    pregabalin (LYRICA) 75 MG capsule Take 1 capsule by mouth 2 times daily for 90 days. Max Daily Amount: 150 mg 60 capsule 2    Misc Natural Products (GLUCOSAMINE CHOND CMP TRIPLE PO) Take by mouth      Magnesium Gluconate (MAGNESIUM 27 PO) Take by mouth      Vitamin D (CHOLECALCIFEROL) 25 MCG (1000 UT) TABS tablet Take 3 tablets by mouth daily      butalbital-acetaminophen-caffeine (FIORICET, ESGIC) -40 MG per tablet Take 1 tablet by mouth every 4 hours as needed for Headaches 60 tablet 2    diclofenac sodium (VOLTAREN) 1 % GEL Apply 2 g topically 4 times daily 150 g 5    busPIRone (BUSPAR) 7.5 MG tablet Take 1 tablet by mouth 2 times daily 60 tablet 2    rOPINIRole (REQUIP) 2 MG tablet Take 1 tablet by mouth nightly 30 tablet 2    PARoxetine (PAXIL) 20 MG tablet Take 1 tablet by mouth every morning 90 tablet 3    cyclobenzaprine

## 2024-05-24 ENCOUNTER — TELEPHONE (OUTPATIENT)
Dept: UROLOGY | Age: 54
End: 2024-05-24

## 2024-05-24 LAB
BACTERIA UR CULT: ABNORMAL
ORGANISM: ABNORMAL

## 2024-06-07 ENCOUNTER — OFFICE VISIT (OUTPATIENT)
Dept: FAMILY MEDICINE CLINIC | Age: 54
End: 2024-06-07
Payer: COMMERCIAL

## 2024-06-07 VITALS
OXYGEN SATURATION: 97 % | BODY MASS INDEX: 33.3 KG/M2 | HEART RATE: 91 BPM | TEMPERATURE: 97.9 F | WEIGHT: 188 LBS | DIASTOLIC BLOOD PRESSURE: 60 MMHG | RESPIRATION RATE: 16 BRPM | SYSTOLIC BLOOD PRESSURE: 100 MMHG

## 2024-06-07 DIAGNOSIS — J02.9 ACUTE PHARYNGITIS, UNSPECIFIED ETIOLOGY: Primary | ICD-10-CM

## 2024-06-07 DIAGNOSIS — H65.03 BILATERAL ACUTE SEROUS OTITIS MEDIA, RECURRENCE NOT SPECIFIED: ICD-10-CM

## 2024-06-07 DIAGNOSIS — H69.93 DYSFUNCTION OF BOTH EUSTACHIAN TUBES: ICD-10-CM

## 2024-06-07 PROCEDURE — 99213 OFFICE O/P EST LOW 20 MIN: CPT | Performed by: NURSE PRACTITIONER

## 2024-06-07 RX ORDER — PREDNISONE 10 MG/1
10 TABLET ORAL DAILY
Qty: 7 TABLET | Refills: 0 | Status: SHIPPED | OUTPATIENT
Start: 2024-06-07 | End: 2024-06-14

## 2024-06-07 RX ORDER — AZITHROMYCIN 250 MG/1
TABLET, FILM COATED ORAL
Qty: 6 TABLET | Refills: 0 | Status: SHIPPED | OUTPATIENT
Start: 2024-06-07 | End: 2024-06-17

## 2024-06-07 RX ORDER — SEMAGLUTIDE 2.68 MG/ML
INJECTION, SOLUTION SUBCUTANEOUS
COMMUNITY

## 2024-06-10 ASSESSMENT — ENCOUNTER SYMPTOMS
ABDOMINAL PAIN: 0
WHEEZING: 0
COUGH: 0
SHORTNESS OF BREATH: 0
CHEST TIGHTNESS: 0
SORE THROAT: 1
ABDOMINAL DISTENTION: 0
BACK PAIN: 0

## 2024-06-10 NOTE — PROGRESS NOTES
2017    ENDOMETRIAL ABLATION      FEMUR FRACTURE SURGERY Right 2021    FEMUR OPEN REDUCTION INTERNAL FIXATION performed by Joselo Manuel MD at [facility] OR    [facility]    HYSTERECTOMY, TOTAL ABDOMINAL (CERVIX REMOVED)  12/15/2020    JOINT REPLACEMENT  12    Right total knee      LYMPHADENECTOMY  10/02/2020    MASTECTOMY Left     SALPINGO-OOPHORECTOMY N/A 12/15/2020    ROBOTIC HYSYER WITH BILATERAL SALPING-OOPHORECTOMY performed by Liyah Zimmerman MD at [facility] OR    TUBAL LIGATION       Family History   Problem Relation Age of Onset    Heart Disease Mother         CHF    Cancer Father     COPD Father     Diabetes Father     High Blood Pressure Father     High Cholesterol Father      Social History     Tobacco Use    Smoking status: Former     Current packs/day: 0.00     Average packs/day: 0.3 packs/day for 12.0 years (3.0 ttl pk-yrs)     Types: Cigarettes     Start date: 1988     Quit date: 2000     Years since quittin.7    Smokeless tobacco: Never   Substance Use Topics    Alcohol use: Yes     Comment: rarely      Current Outpatient Medications   Medication Sig Dispense Refill    semaglutide, 2 MG/DOSE, (OZEMPIC, 2 MG/DOSE,) 8 MG/3ML SOPN sc injection INJECT 2 MG UNDER THE SKIN ONCE WEEKLY for 84 Days      predniSONE (DELTASONE) 10 MG tablet Take 1 tablet by mouth daily for 7 days 7 tablet 0    azithromycin (ZITHROMAX Z-EMILY) 250 MG tablet 2 pills orally for 1 day, then 1 pill orally for 4 days 6 tablet 0    Probiotic Product (PROBIOTIC DAILY PO) Take 1 capsule by mouth daily Women's with urinary      pregabalin (LYRICA) 75 MG capsule Take 1 capsule by mouth 2 times daily for 90 days. Max Daily Amount: 150 mg 60 capsule 2    Misc Natural Products (GLUCOSAMINE CHOND CMP TRIPLE PO) Take by mouth      Magnesium Gluconate (MAGNESIUM 27 PO) Take by mouth      Vitamin D (CHOLECALCIFEROL) 25 MCG (1000 UT) TABS tablet Take 3

## 2024-06-15 DIAGNOSIS — G47.00 INSOMNIA, UNSPECIFIED TYPE: ICD-10-CM

## 2024-06-17 RX ORDER — ALPRAZOLAM 0.5 MG/1
TABLET ORAL
Qty: 90 TABLET | Refills: 2 | Status: SHIPPED | OUTPATIENT
Start: 2024-06-17 | End: 2024-09-17

## 2024-06-20 ENCOUNTER — NURSE ONLY (OUTPATIENT)
Dept: LAB | Age: 54
End: 2024-06-20

## 2024-06-20 DIAGNOSIS — R30.0 DYSURIA: ICD-10-CM

## 2024-06-20 LAB
BACTERIA: ABNORMAL
BILIRUB UR QL STRIP: NEGATIVE
CASTS #/AREA URNS LPF: ABNORMAL /LPF
CASTS #/AREA URNS LPF: ABNORMAL /LPF
CHARACTER UR: ABNORMAL
CHARCOAL URNS QL MICRO: ABNORMAL
COLOR UR: YELLOW
CRYSTALS URNS QL MICRO: ABNORMAL
EPITHELIAL CELLS, UA: ABNORMAL /HPF
GLUCOSE UR QL STRIP.AUTO: NEGATIVE MG/DL
HGB UR QL STRIP.AUTO: ABNORMAL
KETONES UR QL STRIP.AUTO: NEGATIVE
LEUKOCYTE ESTERASE UR QL STRIP.AUTO: ABNORMAL
NITRITE UR QL STRIP.AUTO: POSITIVE
PH UR STRIP.AUTO: 7.5 [PH] (ref 5–9)
PROT UR STRIP.AUTO-MCNC: ABNORMAL MG/DL
RBC #/AREA URNS HPF: ABNORMAL /HPF
RENAL EPI CELLS #/AREA URNS HPF: ABNORMAL /[HPF]
SPECIFIC GRAVITY UA: 1.02 (ref 1–1.03)
UROBILINOGEN, URINE: 1 EU/DL (ref 0–1)
WBC #/AREA URNS HPF: > 200 /HPF
YEAST LIKE FUNGI URNS QL MICRO: ABNORMAL

## 2024-06-21 ENCOUNTER — TELEPHONE (OUTPATIENT)
Dept: UROLOGY | Age: 54
End: 2024-06-21

## 2024-06-21 DIAGNOSIS — R30.0 DYSURIA: Primary | ICD-10-CM

## 2024-06-21 DIAGNOSIS — R31.9 HEMATURIA, UNSPECIFIED TYPE: ICD-10-CM

## 2024-06-21 RX ORDER — CEFDINIR 300 MG/1
300 CAPSULE ORAL 2 TIMES DAILY
Qty: 20 CAPSULE | Refills: 0 | Status: SHIPPED | OUTPATIENT
Start: 2024-06-21 | End: 2024-07-01

## 2024-06-21 NOTE — TELEPHONE ENCOUNTER
Urine micro suggestive of infection    Will send Omnicef empirically. May need to change per senilities.     Re-check micro at completion    Continue probiotics, D-Mannose, Cranberry. Encouraged to consume fluids and avoid constipation     The patient should go to the ED should they develop fever, chills, nausea, vomiting, chest pain, SOB, calf pain, feelings of incomplete emptying, or should they otherwise feel they need evaluated

## 2024-06-21 NOTE — TELEPHONE ENCOUNTER
Patient advised of the urine results and recommendations. She voiced understanding and will  the omnicef.    She is having trouble with constipations and will add miralax to the stools softeners.

## 2024-06-23 LAB
BACTERIA UR CULT: ABNORMAL
ORGANISM: ABNORMAL

## 2024-06-24 ENCOUNTER — TELEPHONE (OUTPATIENT)
Dept: UROLOGY | Age: 54
End: 2024-06-24

## 2024-06-24 NOTE — TELEPHONE ENCOUNTER
Patient advised of the urine results and complete another micro UA after finishing the omnicef. She voiced understanding.

## 2024-06-24 NOTE — TELEPHONE ENCOUNTER
Omnicef sensitive. Take to completion    Repeat micro at completion     The patient should go to the ED should they develop fever, chills, nausea, vomiting, chest pain, SOB, calf pain, feelings of incomplete emptying, or should they otherwise feel they need evaluated

## 2024-06-27 ENCOUNTER — OFFICE VISIT (OUTPATIENT)
Dept: FAMILY MEDICINE CLINIC | Age: 54
End: 2024-06-27
Payer: COMMERCIAL

## 2024-06-27 ENCOUNTER — PATIENT MESSAGE (OUTPATIENT)
Dept: FAMILY MEDICINE CLINIC | Age: 54
End: 2024-06-27

## 2024-06-27 VITALS
HEIGHT: 63 IN | SYSTOLIC BLOOD PRESSURE: 116 MMHG | DIASTOLIC BLOOD PRESSURE: 80 MMHG | BODY MASS INDEX: 32.43 KG/M2 | HEART RATE: 96 BPM | WEIGHT: 183 LBS | RESPIRATION RATE: 12 BRPM

## 2024-06-27 DIAGNOSIS — K59.00 CONSTIPATION, UNSPECIFIED CONSTIPATION TYPE: ICD-10-CM

## 2024-06-27 DIAGNOSIS — H65.03 BILATERAL ACUTE SEROUS OTITIS MEDIA, RECURRENCE NOT SPECIFIED: Primary | ICD-10-CM

## 2024-06-27 DIAGNOSIS — N39.0 RECURRENT UTI: ICD-10-CM

## 2024-06-27 DIAGNOSIS — J02.9 PHARYNGITIS, UNSPECIFIED ETIOLOGY: ICD-10-CM

## 2024-06-27 DIAGNOSIS — M62.838 NECK MUSCLE SPASM: ICD-10-CM

## 2024-06-27 DIAGNOSIS — M43.6 TORTICOLLIS: ICD-10-CM

## 2024-06-27 PROCEDURE — 99213 OFFICE O/P EST LOW 20 MIN: CPT | Performed by: NURSE PRACTITIONER

## 2024-06-27 RX ORDER — MELATONIN/PYRIDOXINE HCL (B6) 5 MG-10 MG
200 TABLET,IMMED, EXTENDED RELEASE, BIPHASIC ORAL DAILY
COMMUNITY

## 2024-06-27 RX ORDER — SEMAGLUTIDE 2.68 MG/ML
INJECTION, SOLUTION SUBCUTANEOUS
Refills: 1 | OUTPATIENT
Start: 2024-06-27

## 2024-06-28 ASSESSMENT — ENCOUNTER SYMPTOMS
CHEST TIGHTNESS: 0
COUGH: 0
ABDOMINAL DISTENTION: 0
SHORTNESS OF BREATH: 0
SORE THROAT: 1
BACK PAIN: 0
ABDOMINAL PAIN: 0
WHEEZING: 0

## 2024-06-28 NOTE — PROGRESS NOTES
[facility]  [facility]  [address] [address]  Dept: [phone]  Dept Fax: [phone]  Loc: [phone]  PROGRESS NOTE      VisitDate: 6/27/2024    Marly Cha is a 53 y.o. female who presents today for:     Chief Complaint   Patient presents with    Otalgia     Left ear pain and sore throat on the left side.          Subjective:  Patient presents with complaint of sore throat left ear pain neck stiffness on the left.   treated with Omnicef per urology for recurrent UTI today.  Patient complains of continued fatigue.  Patient also complains of excessive dry mouth.  History of GERD gastric ulcers UTIs breast cancer 2 diabetes.  Denies chest pain shortness of breath fever.          Review of Systems   Constitutional:  Positive for fatigue. Negative for activity change, appetite change, chills and fever.   HENT:  Positive for ear pain and sore throat.    Eyes:  Negative for visual disturbance.   Respiratory:  Negative for cough, chest tightness, shortness of breath and wheezing.    Cardiovascular:  Negative for chest pain, palpitations and leg swelling.   Gastrointestinal:  Negative for abdominal distention and abdominal pain.   Genitourinary:  Negative for dysuria.   Musculoskeletal:  Positive for arthralgias. Negative for back pain and neck pain.   Skin: Negative.  Negative for rash.   Neurological:  Negative for dizziness, light-headedness and headaches.   Hematological:  Negative for adenopathy.   Psychiatric/Behavioral:  Negative for decreased concentration and dysphoric mood.    All other systems reviewed and are negative.    Past Medical History:   Diagnosis Date    Arthritis     Breast cancer (HCC)     H/O bladder infections     History of stomach ulcers     Knee cartilage, torn, left     PONV (postoperative nausea and vomiting)     Type II or unspecified type diabetes mellitus without mention of complication, not stated as

## 2024-07-01 RX ORDER — PAROXETINE HYDROCHLORIDE 40 MG/1
TABLET, FILM COATED ORAL
Qty: 90 TABLET | Refills: 2 | Status: SHIPPED | OUTPATIENT
Start: 2024-07-01

## 2024-07-05 ENCOUNTER — NURSE ONLY (OUTPATIENT)
Dept: LAB | Age: 54
End: 2024-07-05

## 2024-07-05 DIAGNOSIS — R31.9 HEMATURIA, UNSPECIFIED TYPE: ICD-10-CM

## 2024-07-05 LAB
BACTERIA: ABNORMAL
BILIRUB UR QL STRIP: NEGATIVE
CASTS #/AREA URNS LPF: ABNORMAL /LPF
CASTS #/AREA URNS LPF: ABNORMAL /LPF
CHARACTER UR: CLEAR
CHARCOAL URNS QL MICRO: ABNORMAL
COLOR UR: YELLOW
CRYSTALS URNS QL MICRO: ABNORMAL
EPITHELIAL CELLS, UA: ABNORMAL /HPF
GLUCOSE UR QL STRIP.AUTO: NEGATIVE MG/DL
HGB UR QL STRIP.AUTO: NEGATIVE
KETONES UR QL STRIP.AUTO: NEGATIVE
LEUKOCYTE ESTERASE UR QL STRIP.AUTO: ABNORMAL
NITRITE UR QL STRIP.AUTO: NEGATIVE
PH UR STRIP.AUTO: 5.5 [PH] (ref 5–9)
PROT UR STRIP.AUTO-MCNC: NEGATIVE MG/DL
RBC #/AREA URNS HPF: ABNORMAL /HPF
RENAL EPI CELLS #/AREA URNS HPF: ABNORMAL /[HPF]
SPECIFIC GRAVITY UA: 1.02 (ref 1–1.03)
UROBILINOGEN, URINE: 0.2 EU/DL (ref 0–1)
WBC #/AREA URNS HPF: ABNORMAL /HPF
YEAST LIKE FUNGI URNS QL MICRO: ABNORMAL

## 2024-07-08 ENCOUNTER — TELEPHONE (OUTPATIENT)
Dept: UROLOGY | Age: 54
End: 2024-07-08

## 2024-07-10 DIAGNOSIS — G25.81 RESTLESS LEG SYNDROME: ICD-10-CM

## 2024-07-10 RX ORDER — ROPINIROLE 2 MG/1
2 TABLET, FILM COATED ORAL NIGHTLY
Qty: 90 TABLET | Refills: 3 | Status: SHIPPED | OUTPATIENT
Start: 2024-07-10

## 2024-07-15 RX ORDER — BUSPIRONE HYDROCHLORIDE 7.5 MG/1
7.5 TABLET ORAL 2 TIMES DAILY
Qty: 60 TABLET | Refills: 2 | Status: SHIPPED | OUTPATIENT
Start: 2024-07-15

## 2024-07-24 RX ORDER — TIRZEPATIDE 10 MG/.5ML
10 INJECTION, SOLUTION SUBCUTANEOUS WEEKLY
Qty: 4 ADJUSTABLE DOSE PRE-FILLED PEN SYRINGE | Refills: 1 | Status: SHIPPED | OUTPATIENT
Start: 2024-07-24

## 2024-07-24 NOTE — TELEPHONE ENCOUNTER
LOV 6-27-24  Patient comment: Can i go up to next dose? Tolerating it fine.        To be filled at: None [Patient requested: [facility] [address] [place], [place]]

## 2024-07-24 NOTE — TELEPHONE ENCOUNTER
Well-controlled, continue current medications   LOV: 5/10/2023  [address] [place] [address]

## 2024-07-30 ENCOUNTER — LAB (OUTPATIENT)
Dept: LAB | Age: 54
End: 2024-07-30

## 2024-07-30 DIAGNOSIS — R53.83 FATIGUE, UNSPECIFIED TYPE: ICD-10-CM

## 2024-07-30 DIAGNOSIS — R39.15 URGENCY OF URINATION: ICD-10-CM

## 2024-07-30 LAB
BACTERIA: ABNORMAL
BILIRUB UR QL STRIP: NEGATIVE
CASTS #/AREA URNS LPF: ABNORMAL /LPF
CASTS #/AREA URNS LPF: ABNORMAL /LPF
CHARACTER UR: CLEAR
CHARCOAL URNS QL MICRO: ABNORMAL
COLOR UR: YELLOW
CRYSTALS URNS QL MICRO: ABNORMAL
EPITHELIAL CELLS, UA: ABNORMAL /HPF
GLUCOSE UR QL STRIP.AUTO: NEGATIVE MG/DL
HGB UR QL STRIP.AUTO: NEGATIVE
KETONES UR QL STRIP.AUTO: NEGATIVE
LEUKOCYTE ESTERASE UR QL STRIP.AUTO: NEGATIVE
NITRITE UR QL STRIP.AUTO: NEGATIVE
PH UR STRIP.AUTO: 6.5 [PH] (ref 5–9)
PROT UR STRIP.AUTO-MCNC: ABNORMAL MG/DL
RBC #/AREA URNS HPF: ABNORMAL /HPF
RENAL EPI CELLS #/AREA URNS HPF: ABNORMAL /[HPF]
SPECIFIC GRAVITY UA: 1.01 (ref 1–1.03)
UROBILINOGEN, URINE: 0.2 EU/DL (ref 0–1)
WBC #/AREA URNS HPF: ABNORMAL /HPF
YEAST LIKE FUNGI URNS QL MICRO: ABNORMAL

## 2024-08-01 LAB — BACTERIA UR CULT: NORMAL

## 2024-09-11 DIAGNOSIS — G62.9 NEUROPATHY: ICD-10-CM

## 2024-09-11 RX ORDER — PAROXETINE 40 MG/1
40 TABLET, FILM COATED ORAL DAILY
Qty: 90 TABLET | Refills: 2 | Status: SHIPPED | OUTPATIENT
Start: 2024-09-11

## 2024-09-11 RX ORDER — PAROXETINE 20 MG/1
20 TABLET, FILM COATED ORAL EVERY MORNING
Qty: 90 TABLET | Refills: 3 | Status: CANCELLED | OUTPATIENT
Start: 2024-09-11

## 2024-09-12 RX ORDER — PREGABALIN 75 MG/1
75 CAPSULE ORAL 2 TIMES DAILY
Qty: 60 CAPSULE | Refills: 2 | Status: SHIPPED | OUTPATIENT
Start: 2024-09-12 | End: 2024-12-11

## 2024-09-27 DIAGNOSIS — G25.81 RESTLESS LEG SYNDROME: ICD-10-CM

## 2024-09-30 RX ORDER — ROPINIROLE 2 MG/1
2 TABLET, FILM COATED ORAL NIGHTLY
Qty: 90 TABLET | Refills: 3 | Status: SHIPPED | OUTPATIENT
Start: 2024-09-30

## 2024-09-30 RX ORDER — TIRZEPATIDE 10 MG/.5ML
INJECTION, SOLUTION SUBCUTANEOUS
Qty: 4 ML | Refills: 0 | Status: SHIPPED | OUTPATIENT
Start: 2024-09-30

## 2024-10-08 ENCOUNTER — OFFICE VISIT (OUTPATIENT)
Dept: FAMILY MEDICINE CLINIC | Age: 54
End: 2024-10-08
Payer: COMMERCIAL

## 2024-10-08 VITALS
HEART RATE: 106 BPM | SYSTOLIC BLOOD PRESSURE: 106 MMHG | HEIGHT: 63 IN | WEIGHT: 171 LBS | TEMPERATURE: 98.4 F | RESPIRATION RATE: 20 BRPM | BODY MASS INDEX: 30.3 KG/M2 | DIASTOLIC BLOOD PRESSURE: 86 MMHG | OXYGEN SATURATION: 98 %

## 2024-10-08 DIAGNOSIS — H65.03 BILATERAL ACUTE SEROUS OTITIS MEDIA, RECURRENCE NOT SPECIFIED: ICD-10-CM

## 2024-10-08 DIAGNOSIS — J40 BRONCHITIS: Primary | ICD-10-CM

## 2024-10-08 PROCEDURE — 99213 OFFICE O/P EST LOW 20 MIN: CPT | Performed by: NURSE PRACTITIONER

## 2024-10-08 RX ORDER — AZITHROMYCIN 250 MG/1
TABLET, FILM COATED ORAL
Qty: 6 TABLET | Refills: 0 | Status: SHIPPED | OUTPATIENT
Start: 2024-10-08 | End: 2024-10-18

## 2024-10-08 RX ORDER — BENZONATATE 200 MG/1
200 CAPSULE ORAL 3 TIMES DAILY PRN
Qty: 30 CAPSULE | Refills: 0 | Status: SHIPPED | OUTPATIENT
Start: 2024-10-08 | End: 2024-10-15

## 2024-10-08 RX ORDER — PREDNISONE 10 MG/1
10 TABLET ORAL 2 TIMES DAILY
Qty: 10 TABLET | Refills: 0 | Status: SHIPPED | OUTPATIENT
Start: 2024-10-08 | End: 2024-10-13

## 2024-10-09 ASSESSMENT — ENCOUNTER SYMPTOMS
RHINORRHEA: 1
COUGH: 1
ABDOMINAL PAIN: 0
ABDOMINAL DISTENTION: 0
WHEEZING: 0
CHEST TIGHTNESS: 0
SHORTNESS OF BREATH: 0
BACK PAIN: 0

## 2024-10-09 NOTE — PROGRESS NOTES
[facility]  [facility]  [address] [address]  Dept: [phone]  Dept Fax: [phone]  Loc: [phone]  PROGRESS NOTE      VisitDate: 10/8/2024    Marly Cha is a 53 y.o. female who presents today for:     Chief Complaint   Patient presents with    URI     Home covid test was negative, headache, runny nose, cough, ears congested. Started Saturday.          Subjective:  Patient presents with complaint of headache runny nose cough congestion ear pain which started 4 days ago.  Negative home COVID test.  Denies fever chest pain shortness of breath.  Medical history reviewed          Review of Systems   Constitutional:  Positive for fatigue. Negative for activity change, appetite change, chills and fever.   HENT:  Positive for congestion, ear pain, postnasal drip and rhinorrhea. Negative for ear discharge.    Eyes:  Negative for visual disturbance.   Respiratory:  Positive for cough. Negative for chest tightness, shortness of breath and wheezing.    Cardiovascular:  Negative for chest pain, palpitations and leg swelling.   Gastrointestinal:  Negative for abdominal distention and abdominal pain.   Genitourinary:  Negative for dysuria.   Musculoskeletal:  Negative for arthralgias, back pain and neck pain.   Skin: Negative.  Negative for rash.   Neurological:  Positive for headaches. Negative for dizziness and light-headedness.   Hematological:  Negative for adenopathy.   Psychiatric/Behavioral:  Negative for decreased concentration and dysphoric mood.    All other systems reviewed and are negative.    Past Medical History:   Diagnosis Date    Arthritis     Breast cancer (HCC)     H/O bladder infections     History of stomach ulcers     Knee cartilage, torn, left     PONV (postoperative nausea and vomiting)     Type II or unspecified type diabetes mellitus without mention of complication, not stated as uncontrolled       Past Surgical History:

## 2024-10-28 RX ORDER — PANTOPRAZOLE SODIUM 40 MG/1
40 TABLET, DELAYED RELEASE ORAL EVERY MORNING
Qty: 90 TABLET | Refills: 3 | Status: SHIPPED | OUTPATIENT
Start: 2024-10-28

## 2024-10-31 ENCOUNTER — OFFICE VISIT (OUTPATIENT)
Dept: FAMILY MEDICINE CLINIC | Age: 54
End: 2024-10-31

## 2024-10-31 VITALS
SYSTOLIC BLOOD PRESSURE: 138 MMHG | DIASTOLIC BLOOD PRESSURE: 88 MMHG | WEIGHT: 174 LBS | BODY MASS INDEX: 30.83 KG/M2 | HEIGHT: 63 IN

## 2024-10-31 DIAGNOSIS — R53.83 FATIGUE, UNSPECIFIED TYPE: ICD-10-CM

## 2024-10-31 DIAGNOSIS — Z23 NEED FOR INFLUENZA VACCINATION: ICD-10-CM

## 2024-10-31 DIAGNOSIS — E11.9 TYPE 2 DIABETES MELLITUS WITHOUT COMPLICATION, WITHOUT LONG-TERM CURRENT USE OF INSULIN (HCC): Primary | ICD-10-CM

## 2024-10-31 LAB — HBA1C MFR BLD: 4.9 %

## 2024-10-31 RX ORDER — TIRZEPATIDE 10 MG/.5ML
INJECTION, SOLUTION SUBCUTANEOUS
COMMUNITY
Start: 2024-10-24 | End: 2024-10-31 | Stop reason: SDUPTHER

## 2024-10-31 NOTE — PROGRESS NOTES
Immunizations Administered       Name Date Dose Route    Influenza, FLUCELVAX, (age 6 mo+) IM, Trivalent PF, 0.5mL 10/31/2024 0.5 mL Intramuscular    Site: Deltoid- Right    Lot: [id number]    NDC: 86573-985-14

## 2024-11-01 ASSESSMENT — ENCOUNTER SYMPTOMS
NAUSEA: 0
SHORTNESS OF BREATH: 0
EYES NEGATIVE: 1
BACK PAIN: 0
SORE THROAT: 0
COUGH: 0
DIARRHEA: 0
RHINORRHEA: 0
CHEST TIGHTNESS: 0
VOMITING: 0
ABDOMINAL PAIN: 0

## 2024-11-01 NOTE — PROGRESS NOTES
[facility]  [facility]  [address] [address]  Dept: [phone]  Dept Fax: [phone]  Loc: [phone]  PROGRESS NOTE      VisitDate: 10/31/2024    Marly Cha is a 53 y.o. female who presents today for:  Chief Complaint   Patient presents with    3 Month Follow-Up     Following up from bronchitis. Pt requesting hemoglobin A1C be done due to fatigue. Needs flu shot.       Impression/Plan:  1. Type 2 diabetes mellitus without complication, without long-term current use of insulin (HCC)    2. Fatigue, unspecified type    3. Need for influenza vaccination      Requested Prescriptions      No prescriptions requested or ordered in this encounter     Orders Placed This Encounter   Procedures    Influenza, FLUCELVAX Trivalent, (age 6 mo+) IM, Preservative Free, 0.5mL    POCT glycosylated hemoglobin (Hb A1C)         Subjective:  History of Present Illness  The patient presents for evaluation of multiple medical concerns.    She was diagnosed with bronchitis 3 to 4 weeks ago, which has led to a significant decrease in her energy levels. Despite this, her cough has improved. She was prescribed steroids and antibiotics for her bronchitis but only took the antibiotics as she does not tolerate steroids well.    She has been managing her type 2 diabetes for some time now. Her A1c level is 5.1. She started on Ozempic and is now on Mounjaro, which she believes may be causing her blood sugar levels to drop. She has experienced low blood sugar levels, particularly when she forgets to eat due to being busy. She is trying to lose weight to avoid being classified as obese, having previously been morbidly obese, weighing 273 pounds at her heaviest. She is puzzled as to why she is not losing weight naturally, despite eating less than before. She has been consuming a lot of chicken and meat but has reduced her intake of red meat. She has gained 3 pounds since

## 2024-11-15 RX ORDER — TIRZEPATIDE 10 MG/.5ML
INJECTION, SOLUTION SUBCUTANEOUS
Qty: 4 ML | Refills: 0 | Status: SHIPPED | OUTPATIENT
Start: 2024-11-15

## 2024-12-06 ENCOUNTER — LAB (OUTPATIENT)
Dept: LAB | Age: 54
End: 2024-12-06

## 2024-12-06 DIAGNOSIS — E11.9 TYPE 2 DIABETES MELLITUS WITHOUT COMPLICATION, WITHOUT LONG-TERM CURRENT USE OF INSULIN (HCC): ICD-10-CM

## 2024-12-06 DIAGNOSIS — Z13.220 SCREENING CHOLESTEROL LEVEL: ICD-10-CM

## 2024-12-06 LAB
ALBUMIN SERPL BCG-MCNC: 4.1 G/DL (ref 3.5–5.1)
ALP SERPL-CCNC: 90 U/L (ref 38–126)
ALT SERPL W/O P-5'-P-CCNC: 17 U/L (ref 11–66)
ANION GAP SERPL CALC-SCNC: 14 MEQ/L (ref 8–16)
AST SERPL-CCNC: 22 U/L (ref 5–40)
BILIRUB SERPL-MCNC: 1.4 MG/DL (ref 0.3–1.2)
BUN SERPL-MCNC: 11 MG/DL (ref 7–22)
CALCIUM SERPL-MCNC: 9.5 MG/DL (ref 8.5–10.5)
CHLORIDE SERPL-SCNC: 105 MEQ/L (ref 98–111)
CHOLESTEROL, FASTING: 151 MG/DL (ref 100–199)
CO2 SERPL-SCNC: 26 MEQ/L (ref 23–33)
CREAT SERPL-MCNC: 0.7 MG/DL (ref 0.4–1.2)
GFR SERPL CREATININE-BSD FRML MDRD: > 90 ML/MIN/1.73M2
GLUCOSE SERPL-MCNC: 112 MG/DL (ref 70–108)
HDLC SERPL-MCNC: 43 MG/DL
LDLC SERPL CALC-MCNC: 86 MG/DL
POTASSIUM SERPL-SCNC: 4.7 MEQ/L (ref 3.5–5.2)
PROT SERPL-MCNC: 7 G/DL (ref 6.1–8)
SODIUM SERPL-SCNC: 145 MEQ/L (ref 135–145)
TRIGLYCERIDE, FASTING: 108 MG/DL (ref 0–199)

## 2024-12-09 ENCOUNTER — OFFICE VISIT (OUTPATIENT)
Dept: FAMILY MEDICINE CLINIC | Age: 54
End: 2024-12-09

## 2024-12-09 VITALS
TEMPERATURE: 97.4 F | HEART RATE: 84 BPM | DIASTOLIC BLOOD PRESSURE: 82 MMHG | BODY MASS INDEX: 29.62 KG/M2 | WEIGHT: 167.2 LBS | RESPIRATION RATE: 18 BRPM | SYSTOLIC BLOOD PRESSURE: 132 MMHG | HEIGHT: 63 IN

## 2024-12-09 DIAGNOSIS — N39.0 URINARY TRACT INFECTION WITH HEMATURIA, SITE UNSPECIFIED: ICD-10-CM

## 2024-12-09 DIAGNOSIS — E78.6 LOW HDL (UNDER 40): ICD-10-CM

## 2024-12-09 DIAGNOSIS — R53.83 OTHER FATIGUE: ICD-10-CM

## 2024-12-09 DIAGNOSIS — G47.00 INSOMNIA, UNSPECIFIED TYPE: ICD-10-CM

## 2024-12-09 DIAGNOSIS — R61 NIGHT SWEATS: ICD-10-CM

## 2024-12-09 DIAGNOSIS — R31.9 URINARY TRACT INFECTION WITH HEMATURIA, SITE UNSPECIFIED: ICD-10-CM

## 2024-12-09 DIAGNOSIS — G25.81 RESTLESS LEG SYNDROME: ICD-10-CM

## 2024-12-09 DIAGNOSIS — H65.03 BILATERAL ACUTE SEROUS OTITIS MEDIA, RECURRENCE NOT SPECIFIED: ICD-10-CM

## 2024-12-09 DIAGNOSIS — R82.998 DARK URINE: Primary | ICD-10-CM

## 2024-12-09 DIAGNOSIS — E11.9 TYPE 2 DIABETES MELLITUS WITHOUT COMPLICATION, WITHOUT LONG-TERM CURRENT USE OF INSULIN (HCC): ICD-10-CM

## 2024-12-09 LAB
BILIRUBIN, POC: NORMAL
BLOOD URINE, POC: NORMAL
CLARITY, POC: NORMAL
COLOR, POC: NORMAL
GLUCOSE URINE, POC: NEGATIVE MG/DL
KETONES, POC: NORMAL MG/DL
LEUKOCYTE EST, POC: NORMAL
NITRITE, POC: POSITIVE
PH, POC: 6
PROTEIN, POC: 100 MG/DL
SPECIFIC GRAVITY, POC: 1.02
UROBILINOGEN, POC: 2 MG/DL

## 2024-12-09 RX ORDER — PREDNISONE 10 MG/1
10 TABLET ORAL 2 TIMES DAILY
Qty: 14 TABLET | Refills: 0 | Status: SHIPPED | OUTPATIENT
Start: 2024-12-09 | End: 2024-12-16

## 2024-12-09 RX ORDER — TIRZEPATIDE 5 MG/.5ML
5 INJECTION, SOLUTION SUBCUTANEOUS WEEKLY
Qty: 2 ML | Refills: 2 | Status: SHIPPED | OUTPATIENT
Start: 2024-12-09

## 2024-12-09 RX ORDER — CIPROFLOXACIN 500 MG/1
500 TABLET, FILM COATED ORAL 2 TIMES DAILY
Qty: 20 TABLET | Refills: 0 | Status: SHIPPED | OUTPATIENT
Start: 2024-12-09 | End: 2024-12-19

## 2024-12-09 RX ORDER — BENZONATATE 200 MG/1
200 CAPSULE ORAL 3 TIMES DAILY PRN
Qty: 30 CAPSULE | Refills: 0 | Status: SHIPPED | OUTPATIENT
Start: 2024-12-09

## 2024-12-09 RX ORDER — CETIRIZINE HYDROCHLORIDE 5 MG/1
5 TABLET ORAL DAILY
COMMUNITY
End: 2024-12-09 | Stop reason: SDUPTHER

## 2024-12-09 RX ORDER — CETIRIZINE HYDROCHLORIDE 5 MG/1
5 TABLET ORAL NIGHTLY
Qty: 90 TABLET | Refills: 1 | Status: SHIPPED | OUTPATIENT
Start: 2024-12-09

## 2024-12-09 RX ORDER — ALPRAZOLAM 0.5 MG
TABLET ORAL
COMMUNITY
Start: 2024-12-05

## 2024-12-09 ASSESSMENT — ENCOUNTER SYMPTOMS
CHEST TIGHTNESS: 0
ABDOMINAL DISTENTION: 0
COUGH: 0
ABDOMINAL PAIN: 0
BACK PAIN: 0
WHEEZING: 0
SHORTNESS OF BREATH: 0

## 2024-12-09 NOTE — PROGRESS NOTES
(MOUNJARO) 5 MG/0.5ML SOAJ Inject 5 mg into the skin once a week 2 mL 2    cetirizine (ZYRTEC) 5 MG tablet Take 1 tablet by mouth nightly 90 tablet 1    predniSONE (DELTASONE) 10 MG tablet Take 1 tablet by mouth 2 times daily for 7 days 14 tablet 0    ciprofloxacin (CIPRO) 500 MG tablet Take 1 tablet by mouth 2 times daily for 10 days 20 tablet 0    D-MANNOSE PO Take by mouth      pantoprazole (PROTONIX) 40 MG tablet TAKE 1 TABLET BY MOUTH IN THE MORNING 90 tablet 3    rOPINIRole (REQUIP) 2 MG tablet Take 1 tablet by mouth nightly 90 tablet 3    MOUNJARO 10 MG/0.5ML SOPN SC injection INJECT 1/2 (ONE-HALF) ML SUBCUTANEOUSLY  ONCE A WEEK 4 mL 0    pregabalin (LYRICA) 75 MG capsule Take 1 capsule by mouth 2 times daily for 90 days. Max Daily Amount: 150 mg 60 capsule 2    PARoxetine (PAXIL) 40 MG tablet Take 1 tablet by mouth daily 90 tablet 2    busPIRone (BUSPAR) 7.5 MG tablet take 1 tablet by mouth twice a day 60 tablet 2    Magnesium Gluconate (MAGNESIUM 27 PO) Take by mouth      butalbital-acetaminophen-caffeine (FIORICET, ESGIC) -40 MG per tablet Take 1 tablet by mouth every 4 hours as needed for Headaches 60 tablet 2    diclofenac sodium (VOLTAREN) 1 % GEL Apply 2 g topically 4 times daily 150 g 5    PARoxetine (PAXIL) 20 MG tablet Take 1 tablet by mouth every morning 90 tablet 3    cyclobenzaprine (FLEXERIL) 10 MG tablet Take 1 tablet by mouth 3 times daily as needed for Muscle spasms 40 tablet 0    ondansetron (ZOFRAN) 4 MG tablet take 1 tablet by mouth once daily if needed for nausea and vomiting 30 tablet 0    blood glucose test strips (ACCU-CHEK GUIDE) strip 1 each by In Vitro route daily As needed. 100 each 3    oxyCODONE-acetaminophen (PERCOCET) 5-325 MG per tablet take 1 tablet by mouth every 6 hours if needed for pain      Blood Glucose Monitoring Suppl (ACCU-CHEK GUIDE) w/Device KIT 1 each by Does not apply route daily 1 kit 0    Probiotic Product (PROBIOTIC DAILY PO) Take 1 capsule by mouth

## 2024-12-11 ENCOUNTER — TELEPHONE (OUTPATIENT)
Dept: FAMILY MEDICINE CLINIC | Age: 54
End: 2024-12-11

## 2024-12-11 LAB
BACTERIA UR CULT: ABNORMAL
ORGANISM: ABNORMAL

## 2024-12-11 RX ORDER — NITROFURANTOIN 25; 75 MG/1; MG/1
100 CAPSULE ORAL 2 TIMES DAILY
Qty: 20 CAPSULE | Refills: 0 | Status: SHIPPED | OUTPATIENT
Start: 2024-12-11 | End: 2024-12-21

## 2024-12-11 NOTE — TELEPHONE ENCOUNTER
----- Message from JERI Lopez CNP sent at 12/11/2024  9:39 AM EST -----  Urine culture indicates growth E. coli resistant to Cipro.  Macrobid sent in

## 2024-12-18 ENCOUNTER — TELEPHONE (OUTPATIENT)
Dept: FAMILY MEDICINE CLINIC | Age: 54
End: 2024-12-18

## 2024-12-18 NOTE — TELEPHONE ENCOUNTER
Patient reports mouth is very tender and her throat hurts as well. Tongue is white in color as well believes she developed thrush from strong antibiotic. Requesting rx for diflucan

## 2024-12-19 RX ORDER — NYSTATIN 100000 [USP'U]/ML
SUSPENSION ORAL 4 TIMES DAILY
Qty: 473 ML | Refills: 0 | Status: SHIPPED | OUTPATIENT
Start: 2024-12-19 | End: 2025-01-02

## 2024-12-19 RX ORDER — FLUCONAZOLE 100 MG/1
100 TABLET ORAL DAILY
Qty: 7 TABLET | Refills: 0 | Status: SHIPPED | OUTPATIENT
Start: 2024-12-19 | End: 2024-12-26

## 2024-12-30 ENCOUNTER — TELEPHONE (OUTPATIENT)
Dept: FAMILY MEDICINE CLINIC | Age: 54
End: 2024-12-30

## 2024-12-30 NOTE — TELEPHONE ENCOUNTER
Pt states you saw her  who was positive for Covid 12/27/24 and she tested herself on the same day and was positive. States she has a dry cough, RN, H/a, and Body aches. States  was put on an abx and wondering if she needs one too. She is taking tessalon perles, does not want Paxlovid due to all the side effects and can't take steroids due to excessive sweating. Please advise.     [facility] [place]  Call pt back.

## 2025-01-09 ENCOUNTER — OFFICE VISIT (OUTPATIENT)
Dept: CARDIOLOGY CLINIC | Age: 55
End: 2025-01-09
Payer: COMMERCIAL

## 2025-01-09 VITALS
BODY MASS INDEX: 30.48 KG/M2 | DIASTOLIC BLOOD PRESSURE: 70 MMHG | WEIGHT: 172 LBS | SYSTOLIC BLOOD PRESSURE: 132 MMHG | HEIGHT: 63 IN | HEART RATE: 68 BPM

## 2025-01-09 DIAGNOSIS — R06.09 DYSPNEA ON EXERTION: Primary | ICD-10-CM

## 2025-01-09 PROCEDURE — 99204 OFFICE O/P NEW MOD 45 MIN: CPT | Performed by: NUCLEAR MEDICINE

## 2025-01-09 ASSESSMENT — ENCOUNTER SYMPTOMS
NAUSEA: 0
COLOR CHANGE: 0
ABDOMINAL PAIN: 0
BLOOD IN STOOL: 0
CONSTIPATION: 0
CHEST TIGHTNESS: 1
ANAL BLEEDING: 0
DIARRHEA: 0
SHORTNESS OF BREATH: 0
BACK PAIN: 0
VOMITING: 0
RECTAL PAIN: 0
PHOTOPHOBIA: 0
ABDOMINAL DISTENTION: 0

## 2025-01-09 NOTE — PROGRESS NOTES
Negative for ear discharge and mouth sores.    Eyes:  Negative for photophobia.   Respiratory:  Positive for chest tightness. Negative for shortness of breath.    Cardiovascular:  Negative for chest pain, palpitations and leg swelling.   Gastrointestinal:  Negative for abdominal distention, abdominal pain, anal bleeding, blood in stool, constipation, diarrhea, nausea, rectal pain and vomiting.   Endocrine: Negative for polyphagia.   Genitourinary:  Negative for dysuria and frequency.   Musculoskeletal:  Negative for arthralgias, back pain, gait problem, joint swelling, myalgias, neck pain and neck stiffness.   Skin:  Negative for color change, pallor, rash and wound.   Allergic/Immunologic: Negative for food allergies.   Neurological:  Negative for dizziness, syncope and light-headedness.   Psychiatric/Behavioral:  Negative for behavioral problems, confusion, decreased concentration and dysphoric mood.        Objective:  Physical Exam  HENT:      Head: Normocephalic.      Right Ear: Tympanic membrane and ear canal normal.      Nose: Nose normal.      Mouth/Throat:      Mouth: Mucous membranes are moist.   Eyes:      Pupils: Pupils are equal, round, and reactive to light.   Cardiovascular:      Rate and Rhythm: Normal rate and regular rhythm.      Heart sounds: No murmur heard.  Pulmonary:      Effort: No respiratory distress.      Breath sounds: No stridor. No wheezing, rhonchi or rales.   Chest:      Chest wall: No tenderness.   Abdominal:      General: There is no distension.      Palpations: There is no mass.      Tenderness: There is no abdominal tenderness. There is no right CVA tenderness, left CVA tenderness, guarding or rebound.      Hernia: No hernia is present.   Musculoskeletal:         General: No swelling, tenderness, deformity or signs of injury.      Cervical back: Normal range of motion.      Right lower leg: No edema.      Left lower leg: No edema.   Skin:     Coloration: Skin is not jaundiced or

## 2025-01-21 ENCOUNTER — PATIENT MESSAGE (OUTPATIENT)
Dept: FAMILY MEDICINE CLINIC | Age: 55
End: 2025-01-21

## 2025-01-22 DIAGNOSIS — E11.65 TYPE 2 DIABETES MELLITUS WITH HYPERGLYCEMIA, WITHOUT LONG-TERM CURRENT USE OF INSULIN (HCC): Primary | ICD-10-CM

## 2025-01-30 ENCOUNTER — PATIENT MESSAGE (OUTPATIENT)
Dept: FAMILY MEDICINE CLINIC | Age: 55
End: 2025-01-30

## 2025-01-30 RX ORDER — BENZONATATE 200 MG/1
200 CAPSULE ORAL 3 TIMES DAILY PRN
Qty: 30 CAPSULE | Refills: 0 | Status: SHIPPED | OUTPATIENT
Start: 2025-01-30 | End: 2025-02-09

## 2025-01-30 RX ORDER — AZITHROMYCIN 250 MG/1
TABLET, FILM COATED ORAL
Qty: 6 TABLET | Refills: 0 | Status: SHIPPED | OUTPATIENT
Start: 2025-01-30 | End: 2025-02-09

## 2025-01-30 RX ORDER — PREDNISONE 10 MG/1
10 TABLET ORAL 2 TIMES DAILY
Qty: 10 TABLET | Refills: 0 | Status: SHIPPED | OUTPATIENT
Start: 2025-01-30 | End: 2025-02-04

## 2025-02-19 DIAGNOSIS — E11.65 TYPE 2 DIABETES MELLITUS WITH HYPERGLYCEMIA, WITHOUT LONG-TERM CURRENT USE OF INSULIN (HCC): ICD-10-CM

## 2025-02-19 RX ORDER — TIRZEPATIDE 7.5 MG/.5ML
INJECTION, SOLUTION SUBCUTANEOUS
Qty: 4 ML | Refills: 0 | Status: SHIPPED | OUTPATIENT
Start: 2025-02-19

## 2025-02-25 DIAGNOSIS — F41.9 ANXIETY: Primary | ICD-10-CM

## 2025-02-25 RX ORDER — ALPRAZOLAM 0.5 MG
0.5 TABLET ORAL 3 TIMES DAILY PRN
Qty: 90 TABLET | Refills: 0 | Status: SHIPPED | OUTPATIENT
Start: 2025-02-25 | End: 2025-05-26

## 2025-03-11 DIAGNOSIS — E11.65 TYPE 2 DIABETES MELLITUS WITH HYPERGLYCEMIA, WITHOUT LONG-TERM CURRENT USE OF INSULIN (HCC): ICD-10-CM

## 2025-03-11 RX ORDER — TIRZEPATIDE 7.5 MG/.5ML
7.5 INJECTION, SOLUTION SUBCUTANEOUS WEEKLY
Qty: 4 ML | Refills: 2 | Status: SHIPPED | OUTPATIENT
Start: 2025-03-11

## 2025-03-17 RX ORDER — PAROXETINE 20 MG/1
20 TABLET, FILM COATED ORAL EVERY MORNING
Qty: 90 TABLET | Refills: 0 | Status: SHIPPED | OUTPATIENT
Start: 2025-03-17

## 2025-03-18 RX ORDER — PAROXETINE 20 MG/1
20 TABLET, FILM COATED ORAL EVERY MORNING
Qty: 90 TABLET | Refills: 0 | OUTPATIENT
Start: 2025-03-18

## 2025-04-06 ENCOUNTER — PATIENT MESSAGE (OUTPATIENT)
Dept: FAMILY MEDICINE CLINIC | Age: 55
End: 2025-04-06

## 2025-04-07 RX ORDER — FLUCONAZOLE 100 MG/1
100 TABLET ORAL DAILY
Qty: 7 TABLET | Refills: 0 | Status: SHIPPED | OUTPATIENT
Start: 2025-04-07 | End: 2025-04-14

## 2025-04-07 NOTE — TELEPHONE ENCOUNTER
He is on hydroxychloroquine and had annual eye check recently without complications   Requesting refill per patient message regarding sides of her mouth splitting   LOV: 12/9/2024  [facility] [place]

## 2025-04-21 DIAGNOSIS — G62.9 NEUROPATHY: ICD-10-CM

## 2025-04-23 RX ORDER — PREGABALIN 75 MG/1
75 CAPSULE ORAL 2 TIMES DAILY
Qty: 60 CAPSULE | Refills: 0 | Status: SHIPPED | OUTPATIENT
Start: 2025-04-23 | End: 2025-05-23

## 2025-06-02 DIAGNOSIS — F41.9 ANXIETY: ICD-10-CM

## 2025-06-02 RX ORDER — ALPRAZOLAM 0.5 MG
0.5 TABLET ORAL 3 TIMES DAILY PRN
Qty: 90 TABLET | Refills: 0 | Status: SHIPPED | OUTPATIENT
Start: 2025-06-02 | End: 2025-07-02

## 2025-06-16 DIAGNOSIS — G62.9 NEUROPATHY: ICD-10-CM

## 2025-06-16 RX ORDER — PAROXETINE 20 MG/1
20 TABLET, FILM COATED ORAL EVERY MORNING
Qty: 90 TABLET | Refills: 0 | Status: SHIPPED | OUTPATIENT
Start: 2025-06-16

## 2025-06-16 RX ORDER — PREGABALIN 75 MG/1
75 CAPSULE ORAL 2 TIMES DAILY
Qty: 60 CAPSULE | OUTPATIENT
Start: 2025-06-16 | End: 2025-08-15

## 2025-06-17 RX ORDER — PAROXETINE 40 MG/1
40 TABLET, FILM COATED ORAL DAILY
Qty: 90 TABLET | Refills: 0 | OUTPATIENT
Start: 2025-06-17

## 2025-06-23 DIAGNOSIS — E11.65 TYPE 2 DIABETES MELLITUS WITH HYPERGLYCEMIA, WITHOUT LONG-TERM CURRENT USE OF INSULIN (HCC): ICD-10-CM

## 2025-06-23 RX ORDER — PAROXETINE 40 MG/1
40 TABLET, FILM COATED ORAL DAILY
Qty: 90 TABLET | Refills: 0 | OUTPATIENT
Start: 2025-06-23

## 2025-06-23 RX ORDER — TIRZEPATIDE 7.5 MG/.5ML
INJECTION, SOLUTION SUBCUTANEOUS
Qty: 4 ML | Refills: 0 | Status: SHIPPED | OUTPATIENT
Start: 2025-06-23

## 2025-07-03 ENCOUNTER — OFFICE VISIT (OUTPATIENT)
Dept: FAMILY MEDICINE CLINIC | Age: 55
End: 2025-07-03

## 2025-07-03 VITALS
RESPIRATION RATE: 16 BRPM | SYSTOLIC BLOOD PRESSURE: 110 MMHG | WEIGHT: 183 LBS | HEART RATE: 102 BPM | TEMPERATURE: 99.1 F | DIASTOLIC BLOOD PRESSURE: 70 MMHG | BODY MASS INDEX: 32.42 KG/M2

## 2025-07-03 DIAGNOSIS — G89.29 OTHER CHRONIC PAIN: ICD-10-CM

## 2025-07-03 DIAGNOSIS — F41.9 ANXIETY: ICD-10-CM

## 2025-07-03 DIAGNOSIS — E11.9 TYPE 2 DIABETES MELLITUS WITHOUT COMPLICATION, WITHOUT LONG-TERM CURRENT USE OF INSULIN (HCC): ICD-10-CM

## 2025-07-03 DIAGNOSIS — G25.81 RESTLESS LEG SYNDROME: ICD-10-CM

## 2025-07-03 DIAGNOSIS — G47.00 INSOMNIA, UNSPECIFIED TYPE: ICD-10-CM

## 2025-07-03 DIAGNOSIS — K21.9 GASTROESOPHAGEAL REFLUX DISEASE, UNSPECIFIED WHETHER ESOPHAGITIS PRESENT: ICD-10-CM

## 2025-07-03 DIAGNOSIS — E11.65 TYPE 2 DIABETES MELLITUS WITH HYPERGLYCEMIA, WITHOUT LONG-TERM CURRENT USE OF INSULIN (HCC): Primary | ICD-10-CM

## 2025-07-03 DIAGNOSIS — M54.2 CERVICAL PAIN (NECK): ICD-10-CM

## 2025-07-03 DIAGNOSIS — Z13.220 SCREENING CHOLESTEROL LEVEL: ICD-10-CM

## 2025-07-03 DIAGNOSIS — I89.0 LYMPHEDEMA OF LEFT ARM: ICD-10-CM

## 2025-07-03 DIAGNOSIS — C50.912 INFILTRATING DUCTAL CARCINOMA OF LEFT BREAST (HCC): ICD-10-CM

## 2025-07-03 DIAGNOSIS — G62.9 NEUROPATHY: ICD-10-CM

## 2025-07-03 DIAGNOSIS — I89.0 LYMPHEDEMA: ICD-10-CM

## 2025-07-03 RX ORDER — PREGABALIN 75 MG/1
75 CAPSULE ORAL 2 TIMES DAILY
Qty: 60 CAPSULE | Refills: 0 | Status: SHIPPED | OUTPATIENT
Start: 2025-07-03 | End: 2025-08-02

## 2025-07-03 RX ORDER — OXYCODONE AND ACETAMINOPHEN 5; 325 MG/1; MG/1
1 TABLET ORAL EVERY 6 HOURS PRN
Qty: 28 TABLET | Refills: 0 | Status: SHIPPED | OUTPATIENT
Start: 2025-07-03 | End: 2025-07-10

## 2025-07-03 RX ORDER — PAROXETINE 40 MG/1
40 TABLET, FILM COATED ORAL DAILY
Qty: 90 TABLET | Refills: 2 | Status: SHIPPED | OUTPATIENT
Start: 2025-07-03

## 2025-07-03 RX ORDER — FLUCONAZOLE 100 MG/1
100 TABLET ORAL DAILY
Qty: 7 TABLET | Refills: 0 | Status: SHIPPED | OUTPATIENT
Start: 2025-07-03 | End: 2025-07-10

## 2025-07-03 RX ORDER — HYDROCHLOROTHIAZIDE 25 MG/1
25 TABLET ORAL DAILY PRN
Qty: 30 TABLET | Refills: 1 | Status: SHIPPED | OUTPATIENT
Start: 2025-07-03

## 2025-07-03 SDOH — ECONOMIC STABILITY: FOOD INSECURITY: WITHIN THE PAST 12 MONTHS, THE FOOD YOU BOUGHT JUST DIDN'T LAST AND YOU DIDN'T HAVE MONEY TO GET MORE.: NEVER TRUE

## 2025-07-03 SDOH — ECONOMIC STABILITY: FOOD INSECURITY: WITHIN THE PAST 12 MONTHS, YOU WORRIED THAT YOUR FOOD WOULD RUN OUT BEFORE YOU GOT MONEY TO BUY MORE.: NEVER TRUE

## 2025-07-03 ASSESSMENT — ENCOUNTER SYMPTOMS
ABDOMINAL PAIN: 0
SHORTNESS OF BREATH: 0
BACK PAIN: 0
ABDOMINAL DISTENTION: 0
CHEST TIGHTNESS: 0
COUGH: 0
WHEEZING: 0

## 2025-07-03 ASSESSMENT — PATIENT HEALTH QUESTIONNAIRE - PHQ9
SUM OF ALL RESPONSES TO PHQ QUESTIONS 1-9: 0
SUM OF ALL RESPONSES TO PHQ QUESTIONS 1-9: 0
2. FEELING DOWN, DEPRESSED OR HOPELESS: NOT AT ALL
1. LITTLE INTEREST OR PLEASURE IN DOING THINGS: NOT AT ALL
SUM OF ALL RESPONSES TO PHQ QUESTIONS 1-9: 0
SUM OF ALL RESPONSES TO PHQ QUESTIONS 1-9: 0
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
## 2025-07-08 ENCOUNTER — HOSPITAL ENCOUNTER (OUTPATIENT)
Dept: NUCLEAR MEDICINE | Age: 55
Discharge: HOME | End: 2025-07-08
Attending: NUCLEAR MEDICINE
Payer: COMMERCIAL

## 2025-07-08 ENCOUNTER — HOSPITAL ENCOUNTER (OUTPATIENT)
Age: 55
Discharge: HOME | End: 2025-07-10
Attending: NUCLEAR MEDICINE
Payer: COMMERCIAL

## 2025-07-08 VITALS
WEIGHT: 183 LBS | SYSTOLIC BLOOD PRESSURE: 110 MMHG | HEIGHT: 63 IN | DIASTOLIC BLOOD PRESSURE: 70 MMHG | BODY MASS INDEX: 32.43 KG/M2

## 2025-07-08 DIAGNOSIS — R06.09 DYSPNEA ON EXERTION: ICD-10-CM

## 2025-07-08 LAB
ECHO AO ASC DIAM: 3.5 CM
ECHO AO ASCENDING AORTA INDEX: 1.88 CM/M2
ECHO AV CUSP MM: 2 CM
ECHO AV PEAK GRADIENT: 6 MMHG
ECHO AV PEAK VELOCITY: 1.2 M/S
ECHO AV VELOCITY RATIO: 0.58
ECHO BSA: 1.92 M2
ECHO BSA: 1.92 M2
ECHO EST RA PRESSURE: 3 MMHG
ECHO LA AREA 2C: 12.2 CM2
ECHO LA AREA 4C: 17.1 CM2
ECHO LA DIAMETER INDEX: 1.72 CM/M2
ECHO LA DIAMETER: 3.2 CM
ECHO LA MAJOR AXIS: 5.1 CM
ECHO LA MINOR AXIS: 4.5 CM
ECHO LA VOL BP: 37 ML (ref 22–52)
ECHO LA VOL MOD A2C: 27 ML (ref 22–52)
ECHO LA VOL MOD A4C: 45 ML (ref 22–52)
ECHO LA VOL/BSA BIPLANE: 20 ML/M2 (ref 16–34)
ECHO LA VOLUME INDEX MOD A2C: 15 ML/M2 (ref 16–34)
ECHO LA VOLUME INDEX MOD A4C: 24 ML/M2 (ref 16–34)
ECHO LV E' LATERAL VELOCITY: 10 CM/S
ECHO LV E' SEPTAL VELOCITY: 6.9 CM/S
ECHO LV EJECTION FRACTION 3D: 55 %
ECHO LV FRACTIONAL SHORTENING: 28 % (ref 28–44)
ECHO LV INTERNAL DIMENSION DIASTOLE INDEX: 2.47 CM/M2
ECHO LV INTERNAL DIMENSION DIASTOLIC: 4.6 CM (ref 3.9–5.3)
ECHO LV INTERNAL DIMENSION SYSTOLIC INDEX: 1.77 CM/M2
ECHO LV INTERNAL DIMENSION SYSTOLIC: 3.3 CM
ECHO LV ISOVOLUMETRIC RELAXATION TIME (IVRT): 60 MS
ECHO LV IVSD: 0.9 CM (ref 0.6–0.9)
ECHO LV MASS 2D: 137.7 G (ref 67–162)
ECHO LV MASS INDEX 2D: 74 G/M2 (ref 43–95)
ECHO LV POSTERIOR WALL DIASTOLIC: 0.9 CM (ref 0.6–0.9)
ECHO LV RELATIVE WALL THICKNESS RATIO: 0.39
ECHO LVOT PEAK GRADIENT: 2 MMHG
ECHO LVOT PEAK VELOCITY: 0.7 M/S
ECHO MV A VELOCITY: 0.67 M/S
ECHO MV E DECELERATION TIME (DT): 165 MS
ECHO MV E VELOCITY: 0.57 M/S
ECHO MV E/A RATIO: 0.85
ECHO MV E/E' LATERAL: 5.7
ECHO MV E/E' RATIO (AVERAGED): 6.98
ECHO MV E/E' SEPTAL: 8.26
ECHO MV REGURGITANT PEAK GRADIENT: 64 MMHG
ECHO MV REGURGITANT PEAK VELOCITY: 4 M/S
ECHO PV MAX VELOCITY: 0.6 M/S
ECHO PV PEAK GRADIENT: 1 MMHG
ECHO RIGHT VENTRICULAR SYSTOLIC PRESSURE (RVSP): 23 MMHG
ECHO RV INTERNAL DIMENSION: 2.7 CM
ECHO RV TAPSE: 2 CM (ref 1.7–?)
ECHO TV E WAVE: 0.4 M/S
ECHO TV REGURGITANT MAX VELOCITY: 2.23 M/S
ECHO TV REGURGITANT PEAK GRADIENT: 20 MMHG
NUC STRESS EJECTION FRACTION: 58 %
STRESS BASELINE DIAS BP: 90 MMHG
STRESS BASELINE HR: 89 BPM
STRESS BASELINE SYS BP: 132 MMHG
STRESS ESTIMATED WORKLOAD: 10.1 METS
STRESS EXERCISE DUR MIN: 7 MIN
STRESS EXERCISE DUR SEC: 0 SEC
STRESS PEAK DIAS BP: 101 MMHG
STRESS PEAK SYS BP: 203 MMHG
STRESS PERCENT HR ACHIEVED: 85 %
STRESS POST PEAK HR: 139 BPM
STRESS RATE PRESSURE PRODUCT: NORMAL BPM*MMHG
STRESS STAGE 1 BP: NORMAL MMHG
STRESS STAGE 1 DURATION: 3 MIN:SEC
STRESS STAGE 1 HR: 117 BPM
STRESS STAGE 2 BP: NORMAL MMHG
STRESS STAGE 2 DURATION: 3 MIN:SEC
STRESS STAGE 2 HR: 136 BPM
STRESS STAGE 3 DURATION: 1 MIN:SEC
STRESS STAGE 3 HR: 141 BPM
STRESS STAGE RECOVERY 1 BP: NORMAL MMHG
STRESS STAGE RECOVERY 1 DURATION: 1 MIN:SEC
STRESS STAGE RECOVERY 1 HR: 121 BPM
STRESS STAGE RECOVERY 2 BP: NORMAL MMHG
STRESS STAGE RECOVERY 2 DURATION: 1 MIN:SEC
STRESS STAGE RECOVERY 2 HR: 92 BPM
STRESS STAGE RECOVERY 3 BP: NORMAL MMHG
STRESS STAGE RECOVERY 3 DURATION: 3 MIN:SEC
STRESS STAGE RECOVERY 3 HR: 83 BPM
STRESS STANDING DIAS BP: 90 MMHG
STRESS STANDING HR: 90 BPM
STRESS STANDING SYS BP: 132 MMHG
STRESS TARGET HR: 164 BPM

## 2025-07-08 PROCEDURE — 93018 CV STRESS TEST I&R ONLY: CPT | Performed by: NUCLEAR MEDICINE

## 2025-07-08 PROCEDURE — 93016 CV STRESS TEST SUPVJ ONLY: CPT | Performed by: NUCLEAR MEDICINE

## 2025-07-08 PROCEDURE — 78452 HT MUSCLE IMAGE SPECT MULT: CPT | Performed by: NUCLEAR MEDICINE

## 2025-07-08 PROCEDURE — 93017 CV STRESS TEST TRACING ONLY: CPT

## 2025-07-08 PROCEDURE — 93306 TTE W/DOPPLER COMPLETE: CPT | Performed by: NUCLEAR MEDICINE

## 2025-07-08 PROCEDURE — 93306 TTE W/DOPPLER COMPLETE: CPT

## 2025-07-08 PROCEDURE — 78452 HT MUSCLE IMAGE SPECT MULT: CPT

## 2025-07-08 PROCEDURE — A9500 TC99M SESTAMIBI: HCPCS | Performed by: NUCLEAR MEDICINE

## 2025-07-08 PROCEDURE — 3430000000 HC RX DIAGNOSTIC RADIOPHARMACEUTICAL: Performed by: NUCLEAR MEDICINE

## 2025-07-08 RX ORDER — TETRAKIS(2-METHOXYISOBUTYLISOCYANIDE)COPPER(I) TETRAFLUOROBORATE 1 MG/ML
34.5 INJECTION, POWDER, LYOPHILIZED, FOR SOLUTION INTRAVENOUS
Status: COMPLETED | OUTPATIENT
Start: 2025-07-08 | End: 2025-07-08

## 2025-07-08 RX ORDER — TETRAKIS(2-METHOXYISOBUTYLISOCYANIDE)COPPER(I) TETRAFLUOROBORATE 1 MG/ML
10.4 INJECTION, POWDER, LYOPHILIZED, FOR SOLUTION INTRAVENOUS
Status: COMPLETED | OUTPATIENT
Start: 2025-07-08 | End: 2025-07-08

## 2025-07-08 RX ADMIN — Medication 10.4 MILLICURIE: at 11:41

## 2025-07-08 RX ADMIN — Medication 34.5 MILLICURIE: at 12:50

## 2025-08-03 ENCOUNTER — PATIENT MESSAGE (OUTPATIENT)
Dept: FAMILY MEDICINE CLINIC | Age: 55
End: 2025-08-03

## 2025-08-04 DIAGNOSIS — E11.65 TYPE 2 DIABETES MELLITUS WITH HYPERGLYCEMIA, WITHOUT LONG-TERM CURRENT USE OF INSULIN (HCC): ICD-10-CM

## 2025-08-04 RX ORDER — TIRZEPATIDE 10 MG/.5ML
INJECTION, SOLUTION SUBCUTANEOUS
Qty: 4 ML | Refills: 0 | OUTPATIENT
Start: 2025-08-04

## 2025-08-04 RX ORDER — SPIRONOLACTONE 25 MG/1
25 TABLET ORAL DAILY
Qty: 30 TABLET | Refills: 3 | Status: SHIPPED | OUTPATIENT
Start: 2025-08-04

## 2025-08-20 DIAGNOSIS — G25.81 RESTLESS LEG SYNDROME: ICD-10-CM

## 2025-08-20 RX ORDER — ROPINIROLE 2 MG/1
2 TABLET, FILM COATED ORAL NIGHTLY
Qty: 90 TABLET | Refills: 0 | Status: SHIPPED | OUTPATIENT
Start: 2025-08-20

## 2025-08-26 DIAGNOSIS — G25.81 RESTLESS LEG SYNDROME: ICD-10-CM

## 2025-08-26 DIAGNOSIS — G62.9 NEUROPATHY: ICD-10-CM

## 2025-08-26 DIAGNOSIS — F41.9 ANXIETY: ICD-10-CM

## 2025-08-26 RX ORDER — PREGABALIN 75 MG/1
75 CAPSULE ORAL 2 TIMES DAILY
Qty: 60 CAPSULE | Refills: 0 | Status: SHIPPED | OUTPATIENT
Start: 2025-08-26 | End: 2025-09-25

## 2025-08-26 RX ORDER — PREGABALIN 75 MG/1
75 CAPSULE ORAL 2 TIMES DAILY
Qty: 60 CAPSULE | Refills: 0 | OUTPATIENT
Start: 2025-08-26

## 2025-08-26 RX ORDER — ROPINIROLE 2 MG/1
2 TABLET, FILM COATED ORAL NIGHTLY
Qty: 90 TABLET | Refills: 0 | OUTPATIENT
Start: 2025-08-26

## 2025-08-26 RX ORDER — ALPRAZOLAM 0.5 MG
TABLET ORAL
Qty: 90 TABLET | Refills: 0 | Status: SHIPPED | OUTPATIENT
Start: 2025-08-26 | End: 2025-09-25

## 2025-09-02 RX ORDER — HYDROCHLOROTHIAZIDE 25 MG/1
TABLET ORAL
Qty: 30 TABLET | Refills: 0 | Status: SHIPPED | OUTPATIENT
Start: 2025-09-02

## 2025-09-03 RX ORDER — PAROXETINE 20 MG/1
20 TABLET, FILM COATED ORAL EVERY MORNING
Qty: 90 TABLET | Refills: 0 | Status: SHIPPED | OUTPATIENT
Start: 2025-09-03

## (undated) DEVICE — C-ARM: Brand: UNBRANDED

## (undated) DEVICE — BASIC SINGLE BASIN BTC-LF: Brand: MEDLINE INDUSTRIES, INC.

## (undated) DEVICE — SUTURE VCRL SZ 0 L27IN ABSRB VLT L26MM CT-2 1/2 CIR J334H

## (undated) DEVICE — C-ARMOR C-ARM EQUIPMENT COVERS CLEAR STERILE UNIVERSAL FIT 12 PER CASE: Brand: C-ARMOR

## (undated) DEVICE — PACK-MAJOR

## (undated) DEVICE — BANDAGE COMPR M W6INXL10YD WHT BGE VELC E MTRX HK AND LOOP

## (undated) DEVICE — SUTURE VCRL + SZ 2-0 L27IN ABSRB UD CP-1 1/2 CIR REV CUT VCP266H

## (undated) DEVICE — PACK PROCEDURE SURG GYN ROBOTIC

## (undated) DEVICE — SET GRAV VENT NVENT CK VLV 3 NDL FREE PRT 10 GTT

## (undated) DEVICE — Device

## (undated) DEVICE — BLANKET WRM W29.9XL79.1IN UP BODY FORC AIR MISTRAL-AIR

## (undated) DEVICE — SUTURE VIC + LEN CP1 0 70CM VCP267H

## (undated) DEVICE — STRIP,CLOSURE,WOUND,MEDI-STRIP,1/2X4: Brand: MEDLINE

## (undated) DEVICE — YANKAUER,BULB TIP,W/O VENT,RIGID,STERILE: Brand: MEDLINE

## (undated) DEVICE — GLOVE SURG SZ 65 THK91MIL LTX FREE SYN POLYISOPRENE

## (undated) DEVICE — BLADELESS OBTURATOR: Brand: WECK VISTA

## (undated) DEVICE — NEEDLE SPNL L3.5IN PNK HUB S STL REG WALL FIT STYL W/ QNCKE

## (undated) DEVICE — SOLUTION IV 1000ML 0.9% SOD CHL PH 5 INJ USP VIAFLX PLAS

## (undated) DEVICE — VCARE LARGE UTERINE MANIPULATOR: Brand: VCARE LARGE

## (undated) DEVICE — GLOVE ORANGE PI 7   MSG9070

## (undated) DEVICE — TIP COVER ACCESSORY

## (undated) DEVICE — ELECTRO LUBE IS A SINGLE PATIENT USE DEVICE THAT IS INTENDED TO BE USED ON ELECTROSURGICAL ELECTRODES TO REDUCE STICKING.: Brand: KEY SURGICAL ELECTRO LUBE

## (undated) DEVICE — CANNULA SEAL

## (undated) DEVICE — 450 ML BOTTLE OF 0.05% CHLORHEXIDINE GLUCONATE IN 99.95% STERILE WATER FOR IRRIGATION, USP AND APPLICATOR.: Brand: IRRISEPT ANTIMICROBIAL WOUND LAVAGE

## (undated) DEVICE — SUTURE STRATAFIX SPRL SZ 2-0 L9IN ABSRB VLT MH L36MM 1/2 SXPD2B408

## (undated) DEVICE — TUBING, SUCTION, 1/4" X 12', STRAIGHT: Brand: MEDLINE

## (undated) DEVICE — GLOVE ORANGE PI 7 1/2   MSG9075

## (undated) DEVICE — SUTURE STRATAFIX SPRL SZ 1 L14IN ABSRB VLT L48CM CTX 1/2 SXPD2B405

## (undated) DEVICE — PERI-LOC TARGETER LG FRAG 3.5MM                                    DRILL BIT W/QC: Brand: PERI-LOC

## (undated) DEVICE — SET ADMIN PRIMING 12ML L36IN 2ND INCL RLER CLMP SPIN M

## (undated) DEVICE — BAG,BANDED,W/RUBBERBAND,STERILE,30X36: Brand: MEDLINE

## (undated) DEVICE — GLOVE SURG SZ 6 THK91MIL LTX FREE SYN POLYISOPRENE ANTI

## (undated) DEVICE — SPONGE LAP W18XL18IN WHT COT 4 PLY FLD STRUNG RADPQ DISP ST

## (undated) DEVICE — TRI-LUMEN FILTERED TUBE SET WITH ACTIVATED CHARCOAL FILTER: Brand: AIRSEAL

## (undated) DEVICE — 3.5MM DRILL BIT W/QUICK CONNECT: Brand: PERI-LOC

## (undated) DEVICE — GOWN,SIRUS,NON REINFRCD,LARGE,SET IN SL: Brand: MEDLINE

## (undated) DEVICE — ARM DRAPE